# Patient Record
Sex: FEMALE | Race: BLACK OR AFRICAN AMERICAN | Employment: FULL TIME | URBAN - METROPOLITAN AREA
[De-identification: names, ages, dates, MRNs, and addresses within clinical notes are randomized per-mention and may not be internally consistent; named-entity substitution may affect disease eponyms.]

---

## 2017-07-29 ENCOUNTER — OFFICE VISIT (OUTPATIENT)
Dept: FAMILY MEDICINE CLINIC | Age: 67
End: 2017-07-29

## 2017-07-29 VITALS
RESPIRATION RATE: 14 BRPM | HEIGHT: 68 IN | DIASTOLIC BLOOD PRESSURE: 69 MMHG | TEMPERATURE: 98.6 F | WEIGHT: 166.8 LBS | BODY MASS INDEX: 25.28 KG/M2 | OXYGEN SATURATION: 96 % | HEART RATE: 90 BPM | SYSTOLIC BLOOD PRESSURE: 110 MMHG

## 2017-07-29 DIAGNOSIS — J30.89 PERENNIAL ALLERGIC RHINITIS: ICD-10-CM

## 2017-07-29 DIAGNOSIS — R09.82 POST-NASAL DRAINAGE: ICD-10-CM

## 2017-07-29 DIAGNOSIS — R09.81 NASAL CONGESTION: Primary | ICD-10-CM

## 2017-07-29 RX ORDER — FLUTICASONE PROPIONATE 50 MCG
2 SPRAY, SUSPENSION (ML) NASAL DAILY
Qty: 1 BOTTLE | Refills: 0 | Status: SHIPPED | OUTPATIENT
Start: 2017-07-29 | End: 2017-08-25 | Stop reason: SDUPTHER

## 2017-07-29 RX ORDER — METHYLPREDNISOLONE 4 MG/1
TABLET ORAL
Qty: 1 DOSE PACK | Refills: 0 | Status: SHIPPED | OUTPATIENT
Start: 2017-07-29 | End: 2017-08-25 | Stop reason: ALTCHOICE

## 2017-07-29 NOTE — PROGRESS NOTES
Subjective:     Chief Complaint   Patient presents with    Nasal Discharge     c/o post nasal drip, zyrtec and flonase taken    Cough        She  is a 78y.o. year old female with h/o perineal allergy who presents with a complain of nasal congestion and mild cough for the past 3 weeks. Reports that she takes zyrtec on a regular basis for the allergy. Just started taking Flonase one week ago. C/O post nasal drip but its mostly clear. Denies any wheezing, soa, fever, chills, chest pain or any sinus [pain. States that she is going on a vacation, so would like to make sure there is no infection going on. Pertinent items are noted in HPI.   Objective:     Vitals:    07/29/17 1013   BP: 110/69   Pulse: 90   Resp: 14   Temp: 98.6 °F (37 °C)   TempSrc: Oral   SpO2: 96%   Weight: 166 lb 12.8 oz (75.7 kg)   Height: 5' 8\" (1.727 m)       Physical Examination: General appearance - alert, well appearing, and in no distress, oriented to person, place, and time and normal appearing weight  Mental status - alert, oriented to person, place, and time, normal mood, behavior, speech nasal, dress, motor activity, and thought processes  Ears - bilateral TM's and external ear canals normal  Nose - mucosal congestion, mucosal pallor and sinuses normal and nontender  Mouth - mucous membranes moist, pharynx normal without lesions  Neck - supple, no significant adenopathy  Chest - clear to auscultation, no wheezes, rales or rhonchi, symmetric air entry  Heart - normal rate, regular rhythm, normal S1, S2, no murmurs, rubs, clicks or gallops    Allergies   Allergen Reactions    Pravachol [Pravastatin] Myalgia     40 mg    Zocor [Simvastatin] Myalgia     Tolerated 10 mg fine, but aching on 20 mg      Social History     Social History    Marital status: UNKNOWN     Spouse name: N/A    Number of children: N/A    Years of education: N/A     Occupational History    [employer]      Social History Main Topics    Smoking status: Never Smoker    Smokeless tobacco: Never Used    Alcohol use Yes      Comment: occ wine    Drug use: No    Sexual activity: Yes     Partners: Male     Other Topics Concern    Caffeine Concern No     1/2 cup half caff coffee once a day, otherwise not much caffeine except occ sweet tea    Weight Concern No     happy w/ better diet and increased exercise she has gotten her wt down from 174 to 160 lbs range    Special Diet Yes     Spring 2014: restarted Weight Watchers which she has done on and off since ~     Exercise Yes     yoga and stretches qd; walks 3-5 x a week weather permitting x 81-72 minutes     Social History Narrative      Family History   Problem Relation Age of Onset    Breast Cancer Mother      dx in her 52's,  age 58    Cancer Mother      uterine cancer in her 52's, treated    Hypertension Father     Stroke Father       age 62    Seizures Father     Stroke Maternal Grandmother       in her [de-identified]    Cancer Maternal Grandfather      ?stomach cancer    Heart Disease Maternal Grandfather       in his 66's    Seizures Paternal [de-identified]     Alcohol abuse Brother       age 36 from complications    Breast Cancer Other      maternal 1st cousin    Breast Cancer Paternal Aunt      in her 66's      Past Surgical History:   Procedure Laterality Date    HAND/FINGER SURGERY UNLISTED      dislocated thumb surgery repair    HX  SECTION  ,     HX COLONOSCOPY  2015    Repeat 5 yrs/[facility]    HX HYSTERECTOMY  2004    GAUDENCIO-LSO, fibroids    HX MASTECTOMY  2004    left    HX MASTECTOMY  2004    left breast DCIS, total mastectomy and saline implant    HX TUBAL LIGATION      [place] COLONOSCOPY W/BIOPSY SINGLE/MULTIPLE      benign polyp; f/u 5 yrs, Dr Lesvia Moseley  2005    benign polyp; f/u 5 yrs, Dr Shonda Antoine THYROID/PARATHYROID/SOFT TISS      normal      Past Medical History:   Diagnosis Date    Asthma 2011    Borderline abnormal thyroid function test 1/7/2014 1/2014    Depression with anxiety 3/18/2010    Depression with anxiety 9/14/2011    Fe deficiency anemia 3/18/2010    H/O mammogram 05/23/2016    normal, Dr Sophia Mistry History of bone density study 11/19/2014    normal, Dr Sophia Mistry History of breast cancer, s/p left mastectomy 2004 and 5yr of Tamoxifen 9/14/2011    History of iron deficiency anemia 9/14/2011    Hyperlipidemia 3/18/2010    IBS (irritable bowel syndrome) 3/18/2010    Microscopic hematuria, benign 9/14/2011    Osteoarthritis of right hip 9/14/2011    Pap test, as part of routine gynecological examination 10/27/2015    normal Dr Margi Christianson, repeat 1 yr    Perennial allergic rhinitis 9/14/2011    S/P colonoscopic polypectomy, benign 2005 9/14/2011    S/P GAUDENCIO (total abdominal hysterectomy) w/ LSO for Uterine Fibroids 2004 9/14/2011    Seborrheic keratosis 9/14/2011    Vitamin D deficiency 1/7/2014      Current Outpatient Prescriptions   Medication Sig Dispense Refill    methylPREDNISolone (MEDROL DOSEPACK) 4 mg tablet Follow pack instruction. 1 Dose Pack 0    fluticasone (FLONASE) 50 mcg/actuation nasal spray 2 Sprays by Both Nostrils route daily. 1 Bottle 0    atorvastatin (LIPITOR) 20 mg tablet TAKE 1 TABLET DAILY 90 Tab 3    triamcinolone acetonide (KENALOG) 0.1 % topical cream       fluticasone (FLONASE) 50 mcg/actuation nasal spray 2 Sprays by Both Nostrils route daily. 1 Bottle 0    CALCIUM CARBONATE/VITAMIN D3 (CALCIUM+D PO) Take  by mouth daily. Includes 400 units vit D      DOCOSAHEXANOIC ACID/EPA (FISH OIL PO) Take  by mouth daily.  Cholecalciferol, Vitamin D3, (VITAMIN D3) 1,000 unit cap Take 1,000 Units by mouth daily.  beclomethasone (QVAR) 80 mcg/actuation inhaler Take 1 Puff by inhalation two (2) times a day.  cetirizine (ZYRTEC) 10 mg tablet Take 10 mg by mouth as needed.       albuterol (PROVENTIL HFA, VENTOLIN HFA, PROAIR HFA) 90 mcg/actuation inhaler Take 1 Puff by inhalation every six (6) hours as needed for Wheezing. 1 Inhaler 0        Assessment/ Plan:   Diagnoses and all orders for this visit:    1. Nasal congestion  -    Patient brought her nasal spray with her today. Seems like she has been using it in wrong. I showed her the correct way to use the Flonase. Symptoms been going on for three week. -    Start  methylPREDNISolone (MEDROL DOSEPACK) 4 mg tablet; Follow pack instruction.  -    Start fluticasone (FLONASE) 50 mcg/actuation nasal spray; 2 Sprays by Both Nostrils route daily. -    Take OTC Decongestant. 2. Post-nasal drainage  -     methylPREDNISolone (MEDROL DOSEPACK) 4 mg tablet; Follow pack instruction. -     fluticasone (FLONASE) 50 mcg/actuation nasal spray; 2 Sprays by Both Nostrils route daily. 3. Perennial allergic rhinitis  -     methylPREDNISolone (MEDROL DOSEPACK) 4 mg tablet; Follow pack instruction. -     fluticasone (FLONASE) 50 mcg/actuation nasal spray; 2 Sprays by Both Nostrils route daily. Medication risks/benefits/costs/interactions/alternatives discussed with patient. Advised patient to call back or return to office if symptoms worsen/change/persist. If patient cannot reach us or should anything more severe/urgent arise he/she should proceed directly to the nearest emergency department. Discussed expected course/resolution/complications of diagnosis in detail with patient. Patient given a written after visit summary which includes her diagnoses, current medications and vitals. Patient expressed understanding with the diagnosis and plan. Follow-up Disposition:  Return if symptoms worsen or fail to improve.

## 2017-07-29 NOTE — PROGRESS NOTES
Chief Complaint   Patient presents with    Nasal Discharge     c/o post nasal drip, zyrtec and flonase taken    Cough     X 3 week  Med list reviewed  \"REVIEWED RECORD IN PREPARATION FOR VISIT AND HAVE OBTAINED THE NECESSARY DOCUMENTATION\"

## 2017-08-25 ENCOUNTER — OFFICE VISIT (OUTPATIENT)
Dept: FAMILY MEDICINE CLINIC | Age: 67
End: 2017-08-25

## 2017-08-25 VITALS
OXYGEN SATURATION: 95 % | RESPIRATION RATE: 16 BRPM | SYSTOLIC BLOOD PRESSURE: 126 MMHG | BODY MASS INDEX: 25.01 KG/M2 | DIASTOLIC BLOOD PRESSURE: 70 MMHG | WEIGHT: 165 LBS | TEMPERATURE: 99.4 F | HEART RATE: 90 BPM | HEIGHT: 68 IN

## 2017-08-25 DIAGNOSIS — E78.5 HYPERLIPIDEMIA, UNSPECIFIED HYPERLIPIDEMIA TYPE: Primary | Chronic | ICD-10-CM

## 2017-08-25 DIAGNOSIS — Z23 NEED FOR VACCINATION WITH 13-POLYVALENT PNEUMOCOCCAL CONJUGATE VACCINE: ICD-10-CM

## 2017-08-25 RX ORDER — ASCORBIC ACID 250 MG
TABLET ORAL
COMMUNITY
End: 2019-08-29

## 2017-08-25 RX ORDER — LORATADINE 10 MG/1
10 TABLET ORAL
COMMUNITY
End: 2019-03-29

## 2017-08-25 NOTE — MR AVS SNAPSHOT
Visit Information Date & Time Provider Department Dept. Phone Encounter #  
 8/25/2017  8:00 AM Mike Kelly, [facility] [phone] [id number] Upcoming Health Maintenance Date Due  
 MEDICARE YEARLY EXAM 2/15/2016 INFLUENZA AGE 9 TO ADULT 8/1/2017 DTaP/Tdap/Td series (1 - Tdap) 9/3/2017* Hepatitis C Screening 9/3/2018* ZOSTER VACCINE AGE 60> 9/13/2021* Pneumococcal 65+ Low/Medium Risk (2 of 2 - PCV13) 9/7/2017 BREAST CANCER SCRN MAMMOGRAM 5/23/2018 GLAUCOMA SCREENING Q2Y 3/1/2019 COLONOSCOPY 6/8/2020 *Topic was postponed. The date shown is not the original due date. Allergies as of 8/25/2017  Review Complete On: 8/25/2017 By: Mike Kelly MD  
  
 Severity Noted Reaction Type Reactions Pravachol [Pravastatin]  05/08/2015    Myalgia 40 mg  
 Zocor [Simvastatin]  03/18/2010    Myalgia Tolerated 10 mg fine, but aching on 20 mg  
  
Current Immunizations  Reviewed on 8/25/2017 Name Date Influenza Vaccine 10/1/2013 Pneumococcal Polysaccharide (PPSV-23) 9/7/2016 Reviewed by Mike Kelly MD on 8/25/2017 at  8:39 AM  
 Reviewed by Mike Kelly MD on 8/25/2017 at  8:39 AM  
 Reviewed by Mike Kelly MD on 8/25/2017 at  8:39 AM  
You Were Diagnosed With   
  
 Codes Comments Hyperlipidemia, unspecified hyperlipidemia type    -  Primary ICD-10-CM: E78.5 ICD-9-CM: 272.4 Need for vaccination with 13-polyvalent pneumococcal conjugate vaccine     ICD-10-CM: R57 ICD-9-CM: V03.82 Vitals BP Pulse Temp Resp Height(growth percentile) Weight(growth percentile) 126/70 (BP 1 Location: Right arm, BP Patient Position: Sitting) 90 99.4 °F (37.4 °C) (Oral) 16 5' 8\" (1.727 m) 165 lb (74.8 kg) SpO2 BMI OB Status Smoking Status 95% 25.09 kg/m2 Hysterectomy Never Smoker Vitals History BMI and BSA Data Body Mass Index Body Surface Area 25.09 kg/m 2 1.89 m 2 Preferred Pharmacy Pharmacy Name Phone [facility] [phone] Your Updated Medication List  
  
   
This list is accurate as of: 17  8:42 AM.  Always use your most recent med list.  
  
  
  
  
 albuterol 90 mcg/actuation inhaler Commonly known as:  PROVENTIL HFA, VENTOLIN HFA, PROAIR HFA Take 1 Puff by inhalation every six (6) hours as needed for Wheezing. atorvastatin 20 mg tablet Commonly known as:  LIPITOR  
TAKE 1 TABLET DAILY CALCIUM+D PO Take  by mouth daily. Includes 400 units vit D  
  
 FISH OIL PO Take  by mouth daily. fluticasone 50 mcg/actuation nasal spray Commonly known as:  Norman [place] 2 Sprays by Both Nostrils route daily. pneumococcal 13 collin conj dip 0.5 mL Syrg injection Commonly known as:  PREVNAR-13  
0.5 mL by IntraMUSCular route once for 1 dose. UPON ADMINISTRATION PLEASE FAX VERIFICATION -[phone], THANK YOU!  
  
 QVAR 80 mcg/actuation Aero Generic drug:  beclomethasone Take 1 Puff by inhalation two (2) times daily as needed. triamcinolone acetonide 0.1 % topical cream  
Commonly known as:  KENALOG  
  
 VITAMIN C 250 mg tablet Generic drug:  ascorbic acid (vitamin C) Take  by mouth. VITAMIN D3 1,000 unit Cap Generic drug:  cholecalciferol Take 1,000 Units by mouth daily. ZyrTEC 10 mg tablet Generic drug:  cetirizine Take 10 mg by mouth daily. Prescriptions Printed Refills  
 pneumococcal 13 collin conj dip (PREVNAR-13) 0.5 mL syrg injection 0 Si.5 mL by IntraMUSCular route once for 1 dose. UPON ADMINISTRATION PLEASE FAX VERIFICATION -[phone], THANK YOU! Class: Print Route: IntraMUSCular We Performed the Following CBC W/O DIFF [79383 CPT(R)] LIPID PANEL [88902 CPT(R)] METABOLIC PANEL, COMPREHENSIVE [37990 CPT(R)] TSH 3RD GENERATION [85609 CPT(R)] Patient Instructions Pneumococcal Conjugate Vaccine (PCV13): What You Need to Know Why get vaccinated? Vaccination can protect both children and adults from pneumococcal disease. Pneumococcal disease is caused by bacteria that can spread from person to person through close contact. It can cause ear infections, and it can also lead to more serious infections of the: 
· Lungs (pneumonia). · Blood (bacteremia). · Covering of the brain and spinal cord (meningitis). Pneumococcal pneumonia is most common among adults. Pneumococcal meningitis can cause deafness and brain damage, and it kills about 1 child in 10 who get it. Anyone can get pneumococcal disease, but children under 3years of age and adults 72 years and older, people with certain medical conditions, and cigarette smokers are at the highest risk. Before there was a vaccine, the [facility] saw the following in children under 5 each year from pneumococcal disease: · More than 700 cases of meningitis · About 13,000 blood infections · About 5 million ear infections · About 200 deaths Since the vaccine became available, severe pneumococcal disease in these children has fallen by 88%. About 18,000 older adults die of pneumococcal disease each year in the [place]. Treatment of pneumococcal infections with penicillin and other drugs is not as effective as it used to be, because some strains of the disease have become resistant to these drugs. This makes prevention of the disease through vaccination even more important. PCV13 vaccine Pneumococcal conjugate vaccine (called PCV13) protects against 13 types of pneumococcal bacteria. PCV13 is routinely given to children at 2, 4, 6, and 1515 months of age. It is also recommended for children and adults 3to 59years of age with certain health conditions, and for all adults 72years of age and older. Your doctor can give you details. Some people should not get this vaccine Anyone who has ever had a life-threatening allergic reaction to a dose of this vaccine, to an earlier pneumococcal vaccine called PCV7, or to any vaccine containing diphtheria toxoid (for example, DTaP), should not get PCV13. Anyone with a severe allergy to any component of PCV13 should not get the vaccine. Tell your doctor if the person being vaccinated has any severe allergies. If the person scheduled for vaccination is not feeling well, your healthcare provider might decide to reschedule the shot on another day. Risks of a vaccine reaction With any medicine, including vaccines, there is a chance of reactions. These are usually mild and go away on their own, but serious reactions are also possible. Problems reported following PCV13 varied by age and dose in the series. The most common problems reported among children were: · About half became drowsy after the shot, had a temporary loss of appetite, or had redness or tenderness where the shot was given. · About 1 out of 3 had swelling where the shot was given. · About 1 out of 3 had a mild fever, and about 1 in 20 had a fever over 102.2°F. 
· Up to about 8 out of 10 became fussy or irritable. Adults have reported pain, redness, and swelling where the shot was given; also mild fever, fatigue, headache, chills, or muscle pain. Luis Mosquera children who get PCV13 along with inactivated flu vaccine at the same time may be at increased risk for seizures caused by fever. Ask your doctor for more information. Problems that could happen after any vaccine: · People sometimes faint after a medical procedure, including vaccination. Sitting or lying down for about 15 minutes can help prevent fainting and the injuries caused by a fall. Tell your doctor if you feel dizzy or have vision changes or ringing in the ears. · Some older children and adults get severe pain in the shoulder and have difficulty moving the arm where a shot was given. This happens very rarely. · Any medication can cause a severe allergic reaction. Such reactions from a vaccine are very rare, estimated at about 1 in a million doses, and would happen within a few minutes to a few hours after the vaccination. As with any medicine, there is a very small chance of a vaccine causing a serious injury or death. The safety of vaccines is always being monitored. For more information, visit: www.cdc.gov/vaccinesafety. What if there is a serious reaction? What should I look for? · Look for anything that concerns you, such as signs of a severe allergic reaction, very high fever, or unusual behavior. Signs of a severe allergic reaction can include hives, swelling of the face and throat, difficulty breathing, a fast heartbeat, dizziness, and weakness, usually within a few minutes to a few hours after the vaccination. What should I do? · If you think it is a severe allergic reaction or other emergency that can't wait, call 911 or get the person to the nearest hospital. Otherwise, call your doctor. · Reactions should be reported to the Vaccine Adverse Event Reporting System (VAERS). Your doctor should file this report, or you can do it yourself through the VAERS website at www.vaers. [facility].gov, or by calling 3-993.890.3449. VAERS does not give medical advice. The National Vaccine Injury Compensation Program 
The National Vaccine Injury Compensation Program (VICP) is a federal program that was created to compensate people who may have been injured by certain vaccines. Persons who believe they may have been injured by a vaccine can learn about the program and about filing a claim by calling 7-251.322.9308 or visiting the [employer] website at www.[place].gov/vaccinecompensation. There is a time limit to file a claim for compensation. How can I learn more? · Ask your healthcare provider. He or she can give you the vaccine package insert or suggest other sources of information. · Call your local or state health department. · Contact the Centers for Disease Control and Prevention (CDC): 
¨ Call 0-323.482.5747 (1-800-CDC-INFO) or ¨ Visit CDC's website at www.cdc.gov/vaccines Vaccine Information Statement PCV13 Vaccine 11/5/2015 
[address] [id number]-63 Department of [facility] and [employer] Centers for Disease Control and Prevention Many Vaccine Information Statements are available in Amharic and other languages. See www.immunize.org/vis. Muchas hojas de información sobre vacunas están disponibles en español y en otros idiomas. Visite www.immunize.org/vis. Care instructions adapted under license by [employer] (which disclaims liability or warranty for this information). If you have questions about a medical condition or this instruction, always ask your healthcare professional. [address] any warranty or liability for your use of this information. Introducing [facility]! Gonzales Cox introduces [employer] patient portal. Now you can access parts of your medical record, email your doctor's office, and request medication refills online. 1. In your internet browser, go to [email or web address] [employer] 2. Click on the First Time User? Click Here link in the Sign In box. You will see the New Member Sign Up page. 3. Enter your [employer] Access Code exactly as it appears below. You will not need to use this code after youve completed the sign-up process. If you do not sign up before the expiration date, you must request a new code. · [employer] Access Code: [email or web address] Expires: 10/27/2017 10:43 AM 
 
4. Enter the last four digits of your Social Security Number (xxxx) and Date of Birth (mm/dd/yyyy) as indicated and click Submit. You will be taken to the next sign-up page. 5. Create a [employer] ID. This will be your [employer] login ID and cannot be changed, so think of one that is secure and easy to remember. 6. Create a [employer] password. You can change your password at any time. 7. Enter your Password Reset Question and Answer. This can be used at a later time if you forget your password. 8. Enter your e-mail address. You will receive e-mail notification when new information is available in [address] 9. Click Sign Up. You can now view and download portions of your medical record. 10. Click the Download Summary menu link to download a portable copy of your medical information. If you have questions, please visit the Frequently Asked Questions section of the [employer] website. Remember, [employer] is NOT to be used for urgent needs. For medical emergencies, dial 911. Now available from your iPhone and Android! Please provide this summary of care documentation to your next provider. Your primary care clinician is listed as MARGARET LAURENT. If you have any questions after today's visit, please call [phone].

## 2017-08-25 NOTE — PROGRESS NOTES
Chief Complaint   Patient presents with    Cholesterol Problem     follow up- not fasting          1. Have you been to the ER, urgent care clinic since your last visit? Hospitalized since your last visit? No    2. Have you seen or consulted any other health care providers outside of the [address] [place] [address] since your last visit? Include any pap smears or colon screening.  No

## 2017-08-25 NOTE — PATIENT INSTRUCTIONS
Pneumococcal Conjugate Vaccine (PCV13): What You Need to Know  Why get vaccinated? Vaccination can protect both children and adults from pneumococcal disease. Pneumococcal disease is caused by bacteria that can spread from person to person through close contact. It can cause ear infections, and it can also lead to more serious infections of the:  · Lungs (pneumonia). · Blood (bacteremia). · Covering of the brain and spinal cord (meningitis). Pneumococcal pneumonia is most common among adults. Pneumococcal meningitis can cause deafness and brain damage, and it kills about 1 child in 10 who get it. Anyone can get pneumococcal disease, but children under 3years of age and adults 72 years and older, people with certain medical conditions, and cigarette smokers are at the highest risk. Before there was a vaccine, the [facility] saw the following in children under 5 each year from pneumococcal disease:  · More than 700 cases of meningitis  · About 13,000 blood infections  · About 5 million ear infections  · About 200 deaths  Since the vaccine became available, severe pneumococcal disease in these children has fallen by 88%. About 18,000 older adults die of pneumococcal disease each year in the [place]. Treatment of pneumococcal infections with penicillin and other drugs is not as effective as it used to be, because some strains of the disease have become resistant to these drugs. This makes prevention of the disease through vaccination even more important. PCV13 vaccine  Pneumococcal conjugate vaccine (called PCV13) protects against 13 types of pneumococcal bacteria. PCV13 is routinely given to children at 2, 4, 6, and 1515 months of age. It is also recommended for children and adults 3to 59years of age with certain health conditions, and for all adults 72years of age and older. Your doctor can give you details.   Some people should not get this vaccine  Anyone who has ever had a life-threatening allergic reaction to a dose of this vaccine, to an earlier pneumococcal vaccine called PCV7, or to any vaccine containing diphtheria toxoid (for example, DTaP), should not get PCV13. Anyone with a severe allergy to any component of PCV13 should not get the vaccine. Tell your doctor if the person being vaccinated has any severe allergies. If the person scheduled for vaccination is not feeling well, your healthcare provider might decide to reschedule the shot on another day. Risks of a vaccine reaction  With any medicine, including vaccines, there is a chance of reactions. These are usually mild and go away on their own, but serious reactions are also possible. Problems reported following PCV13 varied by age and dose in the series. The most common problems reported among children were:  · About half became drowsy after the shot, had a temporary loss of appetite, or had redness or tenderness where the shot was given. · About 1 out of 3 had swelling where the shot was given. · About 1 out of 3 had a mild fever, and about 1 in 20 had a fever over 102.2°F.  · Up to about 8 out of 10 became fussy or irritable. Adults have reported pain, redness, and swelling where the shot was given; also mild fever, fatigue, headache, chills, or muscle pain. [employer] children who get PCV13 along with inactivated flu vaccine at the same time may be at increased risk for seizures caused by fever. Ask your doctor for more information. Problems that could happen after any vaccine:  · People sometimes faint after a medical procedure, including vaccination. Sitting or lying down for about 15 minutes can help prevent fainting and the injuries caused by a fall. Tell your doctor if you feel dizzy or have vision changes or ringing in the ears. · Some older children and adults get severe pain in the shoulder and have difficulty moving the arm where a shot was given. This happens very rarely.   · Any medication can cause a severe allergic reaction. Such reactions from a vaccine are very rare, estimated at about 1 in a million doses, and would happen within a few minutes to a few hours after the vaccination. As with any medicine, there is a very small chance of a vaccine causing a serious injury or death. The safety of vaccines is always being monitored. For more information, visit: www.cdc.gov/vaccinesafety. What if there is a serious reaction? What should I look for? · Look for anything that concerns you, such as signs of a severe allergic reaction, very high fever, or unusual behavior. Signs of a severe allergic reaction can include hives, swelling of the face and throat, difficulty breathing, a fast heartbeat, dizziness, and weakness, usually within a few minutes to a few hours after the vaccination. What should I do? · If you think it is a severe allergic reaction or other emergency that can't wait, call 911 or get the person to the nearest hospital. Otherwise, call your doctor. · Reactions should be reported to the Vaccine Adverse Event Reporting System (VAERS). Your doctor should file this report, or you can do it yourself through the VAERS website at www.vaers. [facility].gov, or by calling 2-498.220.6607. VAERS does not give medical advice. The National Vaccine Injury Compensation Program  The National Vaccine Injury Compensation Program (VICP) is a federal program that was created to compensate people who may have been injured by certain vaccines. Persons who believe they may have been injured by a vaccine can learn about the program and about filing a claim by calling 1-309.620.6005 or visiting the [address] [employer] website at www.[facility].gov/vaccinecompensation. There is a time limit to file a claim for compensation. How can I learn more? · Ask your healthcare provider. He or she can give you the vaccine package insert or suggest other sources of information. · Call your local or state health department.   · Contact the [facility] Disease Control and Prevention (CDC):  ¨ Call 4-899.530.8365 (1-800-CDC-INFO) or  ¨ Visit CDC's website at www.cdc.gov/vaccines  Vaccine Information Statement  PCV13 Vaccine  11/5/2015  [address] Estim[address] [id number]  Department of Health and Human Services  Centers for Disease Control and Prevention  Many Vaccine Information Statements are available in [place] and other languages. See www.immunize.org/vis. Muchas hojas de información sobre vacunas están disponibles en español y en otros idiomas. Visite www.immunize.org/vis. Care instructions adapted under license by [employer] (which disclaims liability or warranty for this information). If you have questions about a medical condition or this instruction, always ask your healthcare professional. [address] any warranty or liability for your use of this information.

## 2017-08-25 NOTE — PROGRESS NOTES
HISTORY OF PRESENT ILLNESS   HPI  Follow up hypercholesterolemia, medication check. Doing well on Lipitor, tolerating w/o reaction or side effects. Overall has been getting along pretty well and feeling well in general.  Allergies have been bothering her again for quite some time now. She was treated w/ Medrol Pack last month. She also felt remarkably better when she was out of town in [place] and had no sinus issues while there. The congestion, drainage and cough returned when she got back. She recently changed from Zyrtec to Claritin and continues on Flonase. She has not seen Dr Smith x many years but will follow up there for re-evaluation. REVIEW OF SYMPTOMS     Review of Systems   Constitutional: Negative. Respiratory: Negative. Cardiovascular: Negative. Gastrointestinal: Negative. Musculoskeletal: Negative for myalgias.    Neurological: Negative.            PROBLEM LIST/MEDICAL HISTORY      Problem List  Date Reviewed: 8/25/2017          Codes Class Noted    Borderline abnormal thyroid function test ICD-10-CM: R94.6  ICD-9-CM: 794.5  1/7/2014    Overview Signed 1/7/2014  9:54 PM by Chloe Lemus MD     1/2014             Vitamin D deficiency ICD-10-CM: E55.9  ICD-9-CM: 268.9  1/7/2014        Perennial allergic rhinitis ICD-10-CM: J30.89  ICD-9-CM: 477.8  9/14/2011    Overview Addendum 9/14/2011  1:34 PM by Chloe Lemus MD     2011 Allergist Dr Smith; dust, trees, mold, fall time             Asthma ICD-10-CM: J45.909  ICD-9-CM: 493.90  9/14/2011    Overview Signed 9/14/2011  9:28 AM by Chloe Lemus MD     PFT's allergist office July 2011, Dr Smith             History of iron deficiency anemia ICD-10-CM: Z86.2  ICD-9-CM: V12.3  9/14/2011        Seborrheic keratosis ICD-10-CM: L82.1  ICD-9-CM: 702.19  9/14/2011    Overview Signed 9/14/2011  9:29 AM by Chloe Lemus MD     Dermatology, Dr Morgan Luis             Depression with anxiety ICD-10-CM: F41.8  ICD-9-CM: 300.4  2011    Overview Signed 2011  1:32 PM by Trever Aguero MD     [id number]             History of breast cancer/DCIS, s/p left mastectomy  and 5yr of Tamoxifen ICD-10-CM: Z85.3  ICD-9-CM: V10.3  2011    Overview Addendum 2011  1:34 PM by Trever Aguero MD     DCIS; Onc Dr Rosalio Choudhury             S/P GAUDENCIO (total abdominal hysterectomy) w/ LSO for Uterine Fibroids  ICD-10-CM: Z90.710  ICD-9-CM: V88.01  2011        S/P colonoscopic polypectomy, benign  ICD-10-CM: Z98.890  ICD-9-CM: V45.89  2011    Overview Signed 2011  1:35 PM by MD Dr Anita Perera [place]             Microscopic hematuria, benign ICD-10-CM: R31.29  ICD-9-CM: 599.72  2011    Overview Addendum 10/17/2014 10:51 AM by Trever Aguero MD     Urology, Dr Clement Ramires, ;  FISH test negative and h/o cystoscopy as well             Osteoarthritis of right hip ICD-10-CM: M16.10  ICD-9-CM: 716.95  2011    Overview Signed 2011  1:39 PM by Trever Aguero MD     Xrays 2005             Hyperlipidemia (Chronic) ICD-10-CM: I37.1  ICD-9-CM: 272.4  3/18/2010        IBS (irritable bowel syndrome) (Chronic) ICD-10-CM: K58.9  ICD-9-CM: 564.1  3/18/2010                  PAST SURGICAL HISTORY       Past Surgical History:   Procedure Laterality Date    HAND/FINGER SURGERY UNLISTED      dislocated thumb surgery repair    HX  SECTION  ,     HX COLONOSCOPY  2015    Repeat 5 yrs/Luis Cruz    HX HYSTERECTOMY  2004    GAUDENCIO-LSO, fibroids    HX MASTECTOMY  2004    left    HX MASTECTOMY  2004    left breast DCIS, total mastectomy and saline implant    HX TUBAL LIGATION      CO COLONOSCOPY W/BIOPSY SINGLE/MULTIPLE      benign polyp; f/u 5 yrs, Dr Justin Garcia  2005    benign polyp; f/u 5 yrs, Dr Claudetta Spine THYROID/PARATHYROID/SOFT TISS      normal         MEDICATIONS      Current Outpatient Prescriptions   Medication Sig    ascorbic acid, vitamin C, (VITAMIN C) 250 mg tablet Take  by mouth.  pneumococcal 13 collin conj dip (PREVNAR-13) 0.5 mL syrg injection 0.5 mL by IntraMUSCular route once for 1 dose. UPON ADMINISTRATION PLEASE FAX VERIFICATION -[phone], THANK YOU!    loratadine (CLARITIN) 10 mg tablet Take 10 mg by mouth.  atorvastatin (LIPITOR) 20 mg tablet TAKE 1 TABLET DAILY    triamcinolone acetonide (KENALOG) 0.1 % topical cream     albuterol (PROVENTIL HFA, VENTOLIN HFA, PROAIR HFA) 90 mcg/actuation inhaler Take 1 Puff by inhalation every six (6) hours as needed for Wheezing.  fluticasone (FLONASE) 50 mcg/actuation nasal spray 2 Sprays by Both Nostrils route daily.  DOCOSAHEXANOIC ACID/EPA (FISH OIL PO) Take  by mouth daily.  Cholecalciferol, Vitamin D3, (VITAMIN D3) 1,000 unit cap Take 1,000 Units by mouth daily.  beclomethasone (QVAR) 80 mcg/actuation inhaler Take 1 Puff by inhalation two (2) times daily as needed.  CALCIUM CARBONATE/VITAMIN D3 (CALCIUM+D PO) Take  by mouth daily. Includes 400 units vit D     No current facility-administered medications for this visit.            ALLERGIES     Allergies   Allergen Reactions    Pravachol [Pravastatin] Myalgia     40 mg    Zocor [Simvastatin] Myalgia     Tolerated 10 mg fine, but aching on 20 mg          SOCIAL HISTORY       Social History     Social History    Marital status:      Spouse name: N/A    Number of children: N/A    Years of education: N/A     Occupational History    [employer]      Social History Main Topics    Smoking status: Never Smoker    Smokeless tobacco: Never Used    Alcohol use Yes      Comment: occ wine    Drug use: No    Sexual activity: Yes     Partners: Male     Other Topics Concern    Caffeine Concern No     1 cup decaff coffee a day     Weight Concern No     happy w/ better diet and increased exercise she has gotten her wt down from 174 to 160 lbs range    Special Diet Yes Spring 2014: restarted Weight Watchers which she has done on and off since ~     Exercise Yes     yoga and stretches qd; walks bid x 15 minutes     Social History Narrative        IMMUNIZATIONS  Immunization History   Administered Date(s) Administered    Influenza Vaccine 10/01/2013    Pneumococcal Polysaccharide (PPSV-23) 2016         FAMILY HISTORY     Family History   Problem Relation Age of Onset    Breast Cancer Mother      dx in her 52's,  age 58    Cancer Mother      uterine cancer in her 52's, treated    Hypertension Father     Stroke Father       age 62    Seizures Father     Stroke Maternal Grandmother       in her [de-identified]    Cancer Maternal Grandfather      ?stomach cancer    Heart Disease Maternal Grandfather       in his 66's    Seizures Paternal Aunt     Alcohol abuse Brother       age 36 from complications    Breast Cancer Other      maternal 1st cousin    Breast Cancer Paternal Aunt      in her 66's         VITALS     Visit Vitals    /70 (BP 1 Location: Right arm, BP Patient Position: Sitting)    Pulse 90    Temp 99.4 °F (37.4 °C) (Oral)    Resp 16    Ht 5' 8\" (1.727 m)    Wt 165 lb (74.8 kg)    SpO2 95%    BMI 25.09 kg/m2          PHYSICAL EXAMINATION     Physical Exam   Constitutional: No distress. Cardiovascular: Normal rate, regular rhythm and normal heart sounds. No murmur heard. Pulmonary/Chest: Effort normal and breath sounds normal. No respiratory distress. She has no wheezes. She has no rales. Abdominal: Soft. Bowel sounds are normal. She exhibits no distension and no mass. There is no tenderness. Musculoskeletal: She exhibits no edema or tenderness. Vitals reviewed.              ASSESSMENT & PLAN       ICD-10-CM ICD-9-CM    1. Hyperlipidemia, unspecified hyperlipidemia type E78.5 272.4 CBC W/O DIFF      METABOLIC PANEL, COMPREHENSIVE      LIPID PANEL      TSH 3RD GENERATION   2.  Need for vaccination with 13-polyvalent pneumococcal conjugate vaccine Z23 V03.82 pneumococcal 13 collin conj dip (PREVNAR-13) 0.5 mL syrg injection     She will RTC fasting for the above labs next week  Reviewed diet, exercise and weight control  Cardiovascular risk and specific lipid/LDL goals reviewed  Reviewed medications and side effects in detail; doing well on Lipitor  She will need this renewed x 1 yr after review of labs. Further follow up & other recommendations pending review of labs as well  If all remains good and stable, RTC 1 yr CPE.  Follow up sooner prn

## 2017-08-31 LAB
ALBUMIN SERPL-MCNC: 4.7 G/DL (ref 3.6–4.8)
ALBUMIN/GLOB SERPL: 2 {RATIO} (ref 1.2–2.2)
ALP SERPL-CCNC: 54 IU/L (ref 39–117)
ALT SERPL-CCNC: 13 IU/L (ref 0–32)
AST SERPL-CCNC: 18 IU/L (ref 0–40)
BILIRUB SERPL-MCNC: 0.4 MG/DL (ref 0–1.2)
BUN SERPL-MCNC: 11 MG/DL (ref 8–27)
BUN/CREAT SERPL: 14 (ref 12–28)
CALCIUM SERPL-MCNC: 9.9 MG/DL (ref 8.7–10.3)
CHLORIDE SERPL-SCNC: 100 MMOL/L (ref 96–106)
CHOLEST SERPL-MCNC: 186 MG/DL (ref 100–199)
CO2 SERPL-SCNC: 25 MMOL/L (ref 18–29)
CREAT SERPL-MCNC: 0.77 MG/DL (ref 0.57–1)
ERYTHROCYTE [DISTWIDTH] IN BLOOD BY AUTOMATED COUNT: 12.5 % (ref 12.3–15.4)
GLOBULIN SER CALC-MCNC: 2.3 G/DL (ref 1.5–4.5)
GLUCOSE SERPL-MCNC: 77 MG/DL (ref 65–99)
HCT VFR BLD AUTO: 43 % (ref 34–46.6)
HDLC SERPL-MCNC: 71 MG/DL
HGB BLD-MCNC: 14 G/DL (ref 11.1–15.9)
INTERPRETATION, 910389: NORMAL
LDLC SERPL CALC-MCNC: 103 MG/DL (ref 0–99)
MCH RBC QN AUTO: 26.9 PG (ref 26.6–33)
MCHC RBC AUTO-ENTMCNC: 32.6 G/DL (ref 31.5–35.7)
MCV RBC AUTO: 83 FL (ref 79–97)
PLATELET # BLD AUTO: 293 X10E3/UL (ref 150–379)
POTASSIUM SERPL-SCNC: 5.3 MMOL/L (ref 3.5–5.2)
PROT SERPL-MCNC: 7 G/DL (ref 6–8.5)
RBC # BLD AUTO: 5.2 X10E6/UL (ref 3.77–5.28)
SODIUM SERPL-SCNC: 141 MMOL/L (ref 134–144)
TRIGL SERPL-MCNC: 59 MG/DL (ref 0–149)
TSH SERPL DL<=0.005 MIU/L-ACNC: 4.02 UIU/ML (ref 0.45–4.5)
VLDLC SERPL CALC-MCNC: 12 MG/DL (ref 5–40)
WBC # BLD AUTO: 4.2 X10E3/UL (ref 3.4–10.8)

## 2017-09-04 ENCOUNTER — TELEPHONE (OUTPATIENT)
Dept: FAMILY MEDICINE CLINIC | Age: 67
End: 2017-09-04

## 2017-09-04 DIAGNOSIS — E78.5 HYPERLIPIDEMIA, UNSPECIFIED HYPERLIPIDEMIA TYPE: Chronic | ICD-10-CM

## 2017-09-04 NOTE — TELEPHONE ENCOUNTER
Urine, blood counts, liver, kidney, thyroid all normal  BS good and normal/non diabetes range. Lipids overall very good and stable. Pt not on MyChart. May mail copy to pt. Keep up the good work and cont same medication regimen. I will renew her Lipitor x 1 year but I need to know if she wants this sent to [facility] for mail order or locally at [facility]? Fasting CPE 1 yr.   RTC sooner prn

## 2017-09-04 NOTE — PROGRESS NOTES
Urine, blood counts, liver, kidney, thyroid all normal  BS good and normal/non diabetes range. Lipids overall very good and stable. Pt not on MyChart. May mail copy to pt. Keep up the good work and cont same medication regimen. I have renewed her Lipitor x 1 year. Fasting CPE 1 yr.   RTC sooner prn

## 2017-09-05 RX ORDER — ATORVASTATIN CALCIUM 20 MG/1
TABLET, FILM COATED ORAL
Qty: 90 TAB | Refills: 3 | Status: SHIPPED | OUTPATIENT
Start: 2017-09-05 | End: 2018-08-26 | Stop reason: SDUPTHER

## 2017-09-05 NOTE — TELEPHONE ENCOUNTER
Spoke with patient regarding results and recommendations. Voiced understanding. Can send script to [facility].

## 2017-12-07 ENCOUNTER — TELEPHONE (OUTPATIENT)
Dept: FAMILY MEDICINE CLINIC | Age: 67
End: 2017-12-07

## 2017-12-07 NOTE — TELEPHONE ENCOUNTER
----- Message from Yariel Chandra sent at 12/6/2017  3:14 PM EST -----  Regarding: Dr. Cecy Singer  Pt is requesting a callback to discuss pneumonia shot.   Best callback [phone]

## 2018-01-06 ENCOUNTER — OFFICE VISIT (OUTPATIENT)
Dept: FAMILY MEDICINE CLINIC | Age: 68
End: 2018-01-06

## 2018-01-06 VITALS
TEMPERATURE: 98.2 F | HEIGHT: 68 IN | HEART RATE: 83 BPM | RESPIRATION RATE: 18 BRPM | SYSTOLIC BLOOD PRESSURE: 130 MMHG | BODY MASS INDEX: 24.58 KG/M2 | WEIGHT: 162.2 LBS | DIASTOLIC BLOOD PRESSURE: 74 MMHG

## 2018-01-06 DIAGNOSIS — J00 ACUTE NASOPHARYNGITIS: Primary | ICD-10-CM

## 2018-01-06 NOTE — PROGRESS NOTES
[facility] Note      Subjective:     Chief Complaint   Patient presents with    Nasal Congestion    Cough     Ayanna Quezada is a 78y.o. year old female who presents for evaluation of the following:    Nasal Congestion  4 days nasal congestion, sneezing, runny nose, chills, fever 101 at home, acute body ache which is all improved. 2 day cough and headdahce  Tx: zyrtec/claritin, flonase, aleve  History of chronic allergies  Denie fever, vomiting, diarrhea, ches tpain, SOB, wheezing. Review of Systems   Pertinent positives and negative per HPI. All other systems  reviewed are negative for a Comprehensive ROS (10+).        Past Medical History:   Diagnosis Date    Asthma 9/14/2011    Borderline abnormal thyroid function test 1/7/2014 1/2014    Depression with anxiety 3/18/2010    Depression with anxiety 9/14/2011    Fe deficiency anemia 3/18/2010    H/O mammogram 05/23/2016    normal, Dr Dafne Rsoado History of bone density study 11/19/2014    normal, Dr Dafne Rosado History of breast cancer, s/p left mastectomy 2004 and 5yr of Tamoxifen 9/14/2011    History of iron deficiency anemia 9/14/2011    Hyperlipidemia 3/18/2010    IBS (irritable bowel syndrome) 3/18/2010    Microscopic hematuria, benign 9/14/2011    Osteoarthritis of right hip 9/14/2011    Pap test, as part of routine gynecological examination 10/27/2015    normal Dr Cindi Armijo, repeat 1 yr    Perennial allergic rhinitis 9/14/2011    S/P colonoscopic polypectomy, benign 2005 9/14/2011    S/P GAUDENCIO (total abdominal hysterectomy) w/ LSO for Uterine Fibroids 2004 9/14/2011    Seborrheic keratosis 9/14/2011    Vitamin D deficiency 1/7/2014        Social History     Social History    Marital status:      Spouse name: N/A    Number of children: N/A    Years of education: N/A     Occupational History    [employer]      Social History Main Topics    Smoking status: Never Smoker    Smokeless tobacco: Never Used    Alcohol use Yes      Comment: occ wine    Drug use: No    Sexual activity: Yes     Partners: Male     Other Topics Concern    Caffeine Concern No     1 cup decaff coffee a day     Weight Concern No     happy w/ better diet and increased exercise she has gotten her wt down from 174 to 160 lbs range    Special Diet Yes     Spring 2014: restarted Weight Watchers which she has done on and off since ~ 2012    Exercise Yes     yoga and stretches qd; walks bid x 15 minutes     Social History Narrative       Current Outpatient Prescriptions   Medication Sig    atorvastatin (LIPITOR) 20 mg tablet TAKE 1 TABLET DAILY    ascorbic acid, vitamin C, (VITAMIN C) 250 mg tablet Take  by mouth.  loratadine (CLARITIN) 10 mg tablet Take 10 mg by mouth.  albuterol (PROVENTIL HFA, VENTOLIN HFA, PROAIR HFA) 90 mcg/actuation inhaler Take 1 Puff by inhalation every six (6) hours as needed for Wheezing.  fluticasone (FLONASE) 50 mcg/actuation nasal spray 2 Sprays by Both Nostrils route daily.  CALCIUM CARBONATE/VITAMIN D3 (CALCIUM+D PO) Take  by mouth daily. Includes 400 units vit D    DOCOSAHEXANOIC ACID/EPA (FISH OIL PO) Take  by mouth daily.  Cholecalciferol, Vitamin D3, (VITAMIN D3) 1,000 unit cap Take 1,000 Units by mouth daily.  beclomethasone (QVAR) 80 mcg/actuation inhaler Take 1 Puff by inhalation two (2) times daily as needed.  triamcinolone acetonide (KENALOG) 0.1 % topical cream      No current facility-administered medications for this visit. Objective:     Vitals:    01/06/18 0921   BP: 130/74   Pulse: 83   Resp: 18   Temp: 98.2 °F (36.8 °C)   TempSrc: Oral   Weight: 162 lb 3.2 oz (73.6 kg)   Height: 5' 8\" (1.727 m)       Physical Examination:  General: Alert, cooperative, no distress, appears stated age. Eyes: Conjunctivae/corneas clear. PERRL, EOMs intact. Ears: Normal external ear canals both ears.  TM clear and mobile bilaterally  Nose: Audible nasal congestion. Nares normal. Septum midline. Mucosa normal. No drainage or sinus tenderness. Mouth/Throat: Mild pharyngeal erythema. No tonsillar enlargment. Lips, mucosa, and tongue normal. Teeth and gums normal.  Neck: Supple, symmetrical, trachea midline, no adenopathy. No thyroid enlargement/tenderness/nodules  Back: Symmetric, no curvature. ROM normal. No CVA tenderness. Lungs: Clear to auscultation bilaterally. Normal inspiratory and expiratory ratio. Heart: Regular rate and rhythm, S1, S2 normal, no murmur, click, rub or gallop. Abdomen: Soft, non-tender. Bowel sounds normal. No masses or organomegaly. Extremities: Extremities normal, atraumatic, no cyanosis or edema. Pulses: 2+ and symmetric all extremities. Skin: Skin color, texture, turgor normal. No rashes or lesions on exposed skin. Lymph nodes: Cervical, supraclavicular nodes normal.  Neurologic: CNII-XII intact. Strength 5/5 grossly. Sensation and reflexes normal throughout. No visits with results within 3 Month(s) from this visit.   Latest known visit with results is:    Office Visit on 08/25/2017   Component Date Value Ref Range Status    WBC 08/30/2017 4.2  3.4 - 10.8 x10E3/uL Final    RBC 08/30/2017 5.20  3.77 - 5.28 x10E6/uL Final    HGB 08/30/2017 14.0  11.1 - 15.9 g/dL Final    HCT 08/30/2017 43.0  34.0 - 46.6 % Final    MCV 08/30/2017 83  79 - 97 fL Final    MCH 08/30/2017 26.9  26.6 - 33.0 pg Final    MCHC 08/30/2017 32.6  31.5 - 35.7 g/dL Final    RDW 08/30/2017 12.5  12.3 - 15.4 % Final    PLATELET 05/39/2525 783  150 - 379 x10E3/uL Final    Glucose 08/30/2017 77  65 - 99 mg/dL Final    BUN 08/30/2017 11  8 - 27 mg/dL Final    Creatinine 08/30/2017 0.77  0.57 - 1.00 mg/dL Final    GFR est non-AA 08/30/2017 81  >59 mL/min/1.73 Final    GFR est AA 08/30/2017 93  >59 mL/min/1.73 Final    BUN/Creatinine ratio 08/30/2017 14  12 - 28 Final    Sodium 08/30/2017 141  134 - 144 mmol/L Final    Potassium 08/30/2017 5.3* 3.5 - 5.2 mmol/L Final    Chloride 08/30/2017 100  96 - 106 mmol/L Final    CO2 08/30/2017 25  18 - 29 mmol/L Final    Calcium 08/30/2017 9.9  8.7 - 10.3 mg/dL Final    Protein, total 08/30/2017 7.0  6.0 - 8.5 g/dL Final    Albumin 08/30/2017 4.7  3.6 - 4.8 g/dL Final    GLOBULIN, TOTAL 08/30/2017 2.3  1.5 - 4.5 g/dL Final    A-G Ratio 08/30/2017 2.0  1.2 - 2.2 Final    Bilirubin, total 08/30/2017 0.4  0.0 - 1.2 mg/dL Final    Alk. phosphatase 08/30/2017 54  39 - 117 IU/L Final    AST (SGOT) 08/30/2017 18  0 - 40 IU/L Final    ALT (SGPT) 08/30/2017 13  0 - 32 IU/L Final    Cholesterol, total 08/30/2017 186  100 - 199 mg/dL Final    Triglyceride 08/30/2017 59  0 - 149 mg/dL Final    HDL Cholesterol 08/30/2017 71  >39 mg/dL Final    VLDL, calculated 08/30/2017 12  5 - 40 mg/dL Final    LDL, calculated 08/30/2017 103* 0 - 99 mg/dL Final    TSH 08/30/2017 4.020  0.450 - 4.500 uIU/mL Final    INTERPRETATION 08/30/2017 Note   Final    Supplement report is available. Assessment/ Plan:   Diagnoses and all orders for this visit:    1. Acute nasopharyngitis    Mild. OTC treatment discussed, see patient instructions. Try generic guaifenesin with dextromethorphan. I have discussed the diagnosis with the patient and the intended plan as seen in the above orders. The patient has received an after-visit summary and questions were answered concerning future plans. I have discussed medication side effects and warnings with the patient as well. Follow-up Disposition:  Return if symptoms worsen or fail to improve.       Signed,    Rosio Meneses MD  1/6/2018

## 2018-01-06 NOTE — PROGRESS NOTES
Chief Complaint   Patient presents with    Nasal Congestion    Cough     Pt presents in office today with c/o nasal congestion/cough, onset 01/02/2018  Pt denies chest pain/sob/n/v/d  Pt denies headache/sore throat  Take otc Aleve and Vit C tablets     1. Have you been to the ER, urgent care clinic since your last visit? Hospitalized since your last visit? No    2. Have you seen or consulted any other health care providers outside of the [address] [place] [address] since your last visit? Include any pap smears or colon screening.  No

## 2018-01-06 NOTE — PATIENT INSTRUCTIONS
For your symptoms: Your symptoms may improve with an oral antihistamine. These are available over the counter and include:  Loratadine/claritin  Cetirizine/Zyrtec  Fexofenadine/Allegra  Levocetirizine/Xyzal    · Your symptoms may improve with a nasal steroid. These are available over the counter and include:  · Flonase (aka fluticasone)  · Nasocort (aka triamcinolone)  · Nasonex (aka mometasone)  · Rhinocort (aka budesonide)    · Increase fluid intake, especially water to thin mucous and boost the immune system. · Avoid sugar and dairy while congested since they thicken mucous. · Get plenty of rest!    · Gargle 3 times daily and as needed in Listerine or warm salt water vinegar solutions (1 tsp salt, 1 tsp vinegar in 1 cup lukewarm water.)    · Use OTC nasal saline spray up each nostril four times daily. You could also consider using a netipot with distilled water. · Use humidifier at bedtime. · Use OTC Mucinex 600 mg twice daily to loosen mucous. Try generic guaifenesin with dextromethorphan. · Use OTC Tylenol (up to 650mg every 6 hours) or Ibuprofen (up to 800 mg every 8 hours) as needed for pain, fever or headaches. ·  Nasal Rinse     Return to the doctor for evaluation:  · If mucous is consistently discolored yellow or green throughout the day for more than a week  · If you develop worsening facial pain  · If you develop a fever that will not go away  · If your symptoms worsen instead of improve               Saline Nasal Washes: Care Instructions  Your Care Instructions  Saline nasal washes help keep the nasal passages open by washing out thick or dried mucus. This simple remedy can help relieve symptoms of allergies, sinusitis, and colds. It also can make the nose feel more comfortable by keeping the mucous membranes moist. You may notice a little burning sensation in your nose the first few times you use the solution, but this usually gets better in a few days.   Follow-up care is a key part of your treatment and safety. Be sure to make and go to all appointments, and call your doctor if you are having problems. It's also a good idea to know your test results and keep a list of the medicines you take. How can you care for yourself at home? · You can buy premixed saline solution in a squeeze bottle or other sinus rinse products at a drugstore. Read and follow the instructions on the label. · You also can make your own saline solution by adding 1 teaspoon of salt and 1 teaspoon of baking soda to 2 cups of distilled water. · If you use a homemade solution, pour a small amount into a clean bowl. Using a rubber bulb syringe, squeeze the syringe and place the tip in the salt water. Pull a small amount of the salt water into the syringe by relaxing your hand. · Sit down with your head tilted slightly back. Do not lie down. Put the tip of the bulb syringe or the squeeze bottle a little way into one of your nostrils. Gently drip or squirt a few drops into the nostril. Repeat with the other nostril. Some sneezing and gagging are normal at first.  · Gently blow your nose. · Wipe the syringe or bottle tip clean after each use. · Repeat this 2 or 3 times a day. · Use nasal washes gently if you have nosebleeds often. When should you call for help? Watch closely for changes in your health, and be sure to contact your doctor if:  ? · You often get nosebleeds. ? · You have problems doing the nasal washes. Where can you learn more? Go to http://rory-gabriela.info/. Enter [id number] in the search box to learn more about \"Saline Nasal Washes: Care Instructions. \"  Current as of: May 12, 2017  Content Version: 11.4  © 9614-0267 [employer]. Care instructions adapted under license by [employer] (which disclaims liability or warranty for this information).  If you have questions about a medical condition or this instruction, always ask your healthcare professional. Healthwise, [facility] disclaims any warranty or liability for your use of this information. Upper Respiratory Infection (Cold): Care Instructions  Your Care Instructions    An upper respiratory infection, or URI, is an infection of the nose, sinuses, or throat. URIs are spread by coughs, sneezes, and direct contact. The common cold is the most frequent kind of URI. The flu and sinus infections are other kinds of URIs. Almost all URIs are caused by viruses. Antibiotics won't cure them. But you can treat most infections with home care. This may include drinking lots of fluids and taking over-the-counter pain medicine. You will probably feel better in 4 to 10 days. The doctor has checked you carefully, but problems can develop later. If you notice any problems or new symptoms, get medical treatment right away. Follow-up care is a key part of your treatment and safety. Be sure to make and go to all appointments, and call your doctor if you are having problems. It's also a good idea to know your test results and keep a list of the medicines you take. How can you care for yourself at home? · To prevent dehydration, drink plenty of fluids, enough so that your urine is light yellow or clear like water. Choose water and other caffeine-free clear liquids until you feel better. If you have kidney, heart, or liver disease and have to limit fluids, talk with your doctor before you increase the amount of fluids you drink. · Take an over-the-counter pain medicine, such as acetaminophen (Tylenol), ibuprofen (Advil, Motrin), or naproxen (Aleve). Read and follow all instructions on the label. · Before you use cough and cold medicines, check the label. These medicines may not be safe for young children or for people with certain health problems. · Be careful when taking over-the-counter cold or flu medicines and Tylenol at the same time. Many of these medicines have acetaminophen, which is Tylenol.  Read the labels to make sure that you are not taking more than the recommended dose. Too much acetaminophen (Tylenol) can be harmful. · Get plenty of rest.  · Do not smoke or allow others to smoke around you. If you need help quitting, talk to your doctor about stop-smoking programs and medicines. These can increase your chances of quitting for good. When should you call for help? Call 911 anytime you think you may need emergency care. For example, call if:  ? · You have severe trouble breathing. ?Call your doctor now or seek immediate medical care if:  ? · You seem to be getting much sicker. ? · You have new or worse trouble breathing. ? · You have a new or higher fever. ? · You have a new rash. ? Watch closely for changes in your health, and be sure to contact your doctor if:  ? · You have a new symptom, such as a sore throat, an earache, or sinus pain. ? · You cough more deeply or more often, especially if you notice more mucus or a change in the color of your mucus. ? · You do not get better as expected. Where can you learn more? Go to http://rory-gabriela.info/. Enter K211 in the search box to learn more about \"Upper Respiratory Infection (Cold): Care Instructions. \"  Current as of: May 12, 2017  Content Version: 11.4  © 4375-2410 [employer]wise[employer]. Care instructions adapted under license by [employer] (which disclaims liability or warranty for this information). If you have questions about a medical condition or this instruction, always ask your healthcare professional. [address] any warranty or liability for your use of this information.

## 2018-08-30 ENCOUNTER — OFFICE VISIT (OUTPATIENT)
Dept: FAMILY MEDICINE CLINIC | Age: 68
End: 2018-08-30

## 2018-08-30 VITALS
HEART RATE: 74 BPM | BODY MASS INDEX: 25.28 KG/M2 | WEIGHT: 166.8 LBS | SYSTOLIC BLOOD PRESSURE: 122 MMHG | DIASTOLIC BLOOD PRESSURE: 76 MMHG | TEMPERATURE: 98.4 F | OXYGEN SATURATION: 98 % | HEIGHT: 68 IN | RESPIRATION RATE: 18 BRPM

## 2018-08-30 DIAGNOSIS — E78.5 HYPERLIPIDEMIA, UNSPECIFIED HYPERLIPIDEMIA TYPE: Primary | Chronic | ICD-10-CM

## 2018-08-30 DIAGNOSIS — R94.6 BORDERLINE ABNORMAL THYROID FUNCTION TEST: ICD-10-CM

## 2018-08-30 DIAGNOSIS — Z11.59 NEED FOR HEPATITIS C SCREENING TEST: ICD-10-CM

## 2018-08-30 DIAGNOSIS — Z86.2 HISTORY OF IRON DEFICIENCY ANEMIA: ICD-10-CM

## 2018-08-30 NOTE — PROGRESS NOTES
HISTORY OF PRESENT ILLNESS   HPI  Follow up hyperlipidemia, labs and medication check. Not fasting today. Will come back next week for blood work. Doing well on current medication regimen, tolerating w/o reaction or side effects. Still doing Weight Watchers. Per plan goals, must keep her wt <166 lbs. Walks on lunch break at work x 10-15 minutes and evenings 4 x a week about 20 minutes. She had a bout of IBS flare a few weeks ago w/ abdominal cramping, bloating, diarrhea in response to stress at work. She restarted yoga and relaxation techniques and started feeling much better almost right away. Back to normal now. Last colonoscopy 2015, f/u due 5 yrs from then per GI for h/o polyps. Overall getting along well and feeling well in general.     REVIEW OF SYMPTOMS     Review of Systems   Constitutional: Negative. Respiratory: Negative. Cardiovascular: Negative. Musculoskeletal: Negative for myalgias.    Psychiatric/Behavioral: Negative.            PROBLEM LIST/MEDICAL HISTORY      Problem List  Date Reviewed: 8/30/2018          Codes Class Noted    Borderline abnormal thyroid function test ICD-10-CM: R94.6  ICD-9-CM: 794.5  1/7/2014    Overview Signed 1/7/2014  9:54 PM by Paul Chong MD     1/2014             Vitamin D deficiency ICD-10-CM: E55.9  ICD-9-CM: 268.9  1/7/2014        Perennial allergic rhinitis ICD-10-CM: J30.89  ICD-9-CM: 477.8  9/14/2011    Overview Addendum 9/14/2011  1:34 PM by Paul Chong MD     2011 Allergist Dr Smith; dust, trees, mold, fall time             Asthma ICD-10-CM: J45.909  ICD-9-CM: 493.90  9/14/2011    Overview Signed 9/14/2011  9:28 AM by Paul Chong MD     PFT's allergist office July 2011, Dr Smith             History of iron deficiency anemia ICD-10-CM: Z86.2  ICD-9-CM: V12.3  9/14/2011        Seborrheic keratosis ICD-10-CM: L82.1  ICD-9-CM: 702.19  9/14/2011    Overview Signed 9/14/2011  9:29 AM by Paul Chong MD Dermatology, Dr Norma Gasca             Depression with anxiety ICD-10-CM: F41.8  ICD-9-CM: 300.4  2011    Overview Signed 2011  1:32 PM by Ksenia Celis MD     [id number]             History of breast cancer/DCIS, s/p left mastectomy  and 5yr of Tamoxifen ICD-10-CM: Z85.3  ICD-9-CM: V10.3  2011    Overview Addendum 2011  1:34 PM by Ksenia Celis MD     DCIS; Onc Dr Kym Harrell             S/P GAUDENCIO (total abdominal hysterectomy) w/ LSO for Uterine Fibroids  ICD-10-CM: Z90.710  ICD-9-CM: V88.01  2011        S/P colonoscopic polypectomy, benign  ICD-10-CM: Z98.890  ICD-9-CM: V45.89  2011    Overview Signed 2011  1:35 PM by MD Dr Jimenez Meade             Microscopic hematuria, benign ICD-10-CM: R31.29  ICD-9-CM: 599.72  2011    Overview Addendum 10/17/2014 10:51 AM by Ksenia Celis MD     Urology, Dr Sarabjit Llanes, 2008;  FISH test negative and h/o cystoscopy as well             Osteoarthritis of right hip ICD-10-CM: M16.10  ICD-9-CM: 716.95  2011    Overview Signed 2011  1:39 PM by Ksenia Celis MD     Xrays 2005             Hyperlipidemia (Chronic) ICD-10-CM: R84.9  ICD-9-CM: 272.4  3/18/2010        IBS (irritable bowel syndrome) (Chronic) ICD-10-CM: K58.9  ICD-9-CM: 564.1  3/18/2010                  PAST SURGICAL HISTORY       Past Surgical History:   Procedure Laterality Date    HAND/FINGER SURGERY UNLISTED      dislocated thumb surgery repair    HX  SECTION  ,     HX COLONOSCOPY  2015    Repeat 5 yrs/Luis Brand    HX HYSTERECTOMY  2004    GAUDENCIO-LSO, fibroids    HX MASTECTOMY  2004    left    HX MASTECTOMY  2004    left breast DCIS, total mastectomy and saline implant    HX TUBAL LIGATION      IA COLONOSCOPY W/BIOPSY SINGLE/MULTIPLE      benign polyp; f/u 5 yrs, Dr Sharif Velazquez  2005    benign polyp; f/u 5 yrs, Dr Socorro Morel THYROID/PARATHYROID/SOFT TISS  2007    normal         MEDICATIONS      Current Outpatient Prescriptions   Medication Sig    Cetirizine (ZYRTEC) 10 mg cap Take  by mouth.  atorvastatin (LIPITOR) 20 mg tablet TAKE 1 TABLET DAILY    ascorbic acid, vitamin C, (VITAMIN C) 250 mg tablet Take  by mouth.  albuterol (PROVENTIL HFA, VENTOLIN HFA, PROAIR HFA) 90 mcg/actuation inhaler Take 1 Puff by inhalation every six (6) hours as needed for Wheezing.  fluticasone (FLONASE) 50 mcg/actuation nasal spray 2 Sprays by Both Nostrils route daily. (Patient taking differently: 2 Sprays by Both Nostrils route daily as needed.)    CALCIUM CARBONATE/VITAMIN D3 (CALCIUM+D PO) Take  by mouth daily. Includes 400 units vit D    DOCOSAHEXANOIC ACID/EPA (FISH OIL PO) Take  by mouth daily.  Cholecalciferol, Vitamin D3, (VITAMIN D3) 1,000 unit cap Take 1,000 Units by mouth daily.  beclomethasone (QVAR) 80 mcg/actuation inhaler Take 1 Puff by inhalation two (2) times daily as needed.  loratadine (CLARITIN) 10 mg tablet Take 10 mg by mouth. No current facility-administered medications for this visit.            ALLERGIES     Allergies   Allergen Reactions    Pravachol [Pravastatin] Myalgia     40 mg    Zocor [Simvastatin] Myalgia     Tolerated 10 mg fine, but aching on 20 mg          SOCIAL HISTORY       Social History     Social History    Marital status:      Spouse name: N/A    Number of children: N/A    Years of education: N/A     Occupational History    [employer]      Social History Main Topics    Smoking status: Never Smoker    Smokeless tobacco: Never Used    Alcohol use Yes      Comment: occ wine    Drug use: No    Sexual activity: Yes     Partners: Male     Other Topics Concern    Caffeine Concern No     1 cup decaff coffee a day     Weight Concern No     happy w/ better diet and increased exercise she has gotten her wt down from 174 to 160 lbs range    Special Diet Yes     Spring 2014: restarted Weight Watchers which she has done on and off since ~     Exercise Yes     yoga once a week, walks alot at work x 10-15 minutes and in neighborhood 4 x a week x 20 minutes     Social History Narrative        IMMUNIZATIONS  Immunization History   Administered Date(s) Administered    Influenza Vaccine 10/01/2013    Pneumococcal Polysaccharide (PPSV-23) 2016         FAMILY HISTORY     Family History   Problem Relation Age of Onset    Breast Cancer Mother      dx in her 52's,  age 58    Cancer Mother      uterine cancer in her 52's, treated    Hypertension Father     Stroke Father       age 62    Seizures Father     Stroke Maternal Grandmother       in her [de-identified]    Cancer Maternal Grandfather      ?stomach cancer    Heart Disease Maternal Grandfather       in his 66's    Seizures Paternal Aunt     Alcohol abuse Brother       age 36 from complications    Breast Cancer Other      maternal 1st cousin    Breast Cancer Paternal Aunt      in her 66's         VITALS     Visit Vitals    /76 (BP 1 Location: Left arm, BP Patient Position: Sitting)    Pulse 74    Temp 98.4 °F (36.9 °C) (Oral)    Resp 18    Ht 5' 8\" (1.727 m)    Wt 166 lb 12.8 oz (75.7 kg)    SpO2 98%    BMI 25.36 kg/m2          PHYSICAL EXAMINATION     Physical Exam   Constitutional: She is well-developed, well-nourished, and in no distress. Neck: Neck supple. Carotid bruit is not present. No thyromegaly present. Cardiovascular: Normal rate, regular rhythm and normal heart sounds. No murmur heard. Pulmonary/Chest: Effort normal and breath sounds normal.   Abdominal: Soft. Bowel sounds are normal. She exhibits no distension and no mass. There is no tenderness. Musculoskeletal: She exhibits no edema or tenderness. Lymphadenopathy:     She has no cervical adenopathy. Vitals reviewed.              ASSESSMENT & PLAN       ICD-10-CM ICD-9-CM    1.  Hyperlipidemia, unspecified hyperlipidemia type E78.5 272.4 LIPID PANEL      METABOLIC PANEL, COMPREHENSIVE   2. Borderline abnormal thyroid function test R94.6 794.5 T4, FREE      TSH 3RD GENERATION      T3 TOTAL   3. History of iron deficiency anemia Z86.2 V12.3 CBC W/O DIFF   4. Need for hepatitis C screening test Z11.59 V73.89 HEPATITIS C AB     She will RTC fasting for the above labs next week  Cardiovascular risk and specific lipid/LDL goals reviewed  Reviewed diet, nutrition, exercise, weight management, BMI/goals, age/risk based screening recommendations, health maintenance & prevention counseling. Reviewed medications and side effects in detail, doing well on current regimen  Sees gyn annually for well woman care  Further follow up & other recommendations pending review of labs.  If all remains good and stable, follow up in 1 yr, sooner prn

## 2018-08-30 NOTE — PROGRESS NOTES
Chief Complaint   Patient presents with    Cholesterol Problem     follow up - will come back next week for labs      1. Have you been to the ER, urgent care clinic since your last visit? Hospitalized since your last visit? No    2. Have you seen or consulted any other health care providers outside of the [address] [place] [address] since your last visit? Include any pap smears or colon screening.    Yes Dr David Looney - gyn

## 2018-08-30 NOTE — PATIENT INSTRUCTIONS
High Cholesterol: Care Instructions  Your Care Instructions    Cholesterol is a type of fat in your blood. It is needed for many body functions, such as making new cells. Cholesterol is made by your body. It also comes from food you eat. High cholesterol means that you have too much of the fat in your blood. This raises your risk of a heart attack and stroke. LDL and HDL are part of your total cholesterol. LDL is the \"bad\" cholesterol. High LDL can raise your risk for heart disease, heart attack, and stroke. HDL is the \"good\" cholesterol. It helps clear bad cholesterol from the body. High HDL is linked with a lower risk of heart disease, heart attack, and stroke. Your cholesterol levels help your doctor find out your risk for having a heart attack or stroke. You and your doctor can talk about whether you need to lower your risk and what treatment is best for you. A heart-healthy lifestyle along with medicines can help lower your cholesterol and your risk. The way you choose to lower your risk will depend on how high your risk is for heart attack and stroke. It will also depend on how you feel about taking medicines. Follow-up care is a key part of your treatment and safety. Be sure to make and go to all appointments, and call your doctor if you are having problems. It's also a good idea to know your test results and keep a list of the medicines you take. How can you care for yourself at home? · Eat a variety of foods every day. Good choices include fruits, vegetables, whole grains (like oatmeal), dried beans and peas, nuts and seeds, soy products (like tofu), and fat-free or low-fat dairy products. · Replace butter, margarine, and hydrogenated or partially hydrogenated oils with olive and canola oils. (Canola oil margarine without trans fat is fine.)  · Replace red meat with fish, poultry, and soy protein (like tofu). · Limit processed and packaged foods like chips, crackers, and cookies.   · Bake, broil, or steam foods. Don't guerrero them. · Be physically active. Get at least 30 minutes of exercise on most days of the week. Walking is a good choice. You also may want to do other activities, such as running, swimming, cycling, or playing tennis or team sports. · Stay at a healthy weight or lose weight by making the changes in eating and physical activity listed above. Losing just a small amount of weight, even 5 to 10 pounds, can reduce your risk for having a heart attack or stroke. · Do not smoke. When should you call for help? Watch closely for changes in your health, and be sure to contact your doctor if:    · You need help making lifestyle changes.     · You have questions about your medicine. Where can you learn more? Go to http://rory-gabriela.info/. Enter Y415 in the search box to learn more about \"High Cholesterol: Care Instructions. \"  Current as of: May 10, 2017  Content Version: 11.7  © 3333-9680 [employer]. Care instructions adapted under license by [employer] (which disclaims liability or warranty for this information). If you have questions about a medical condition or this instruction, always ask your healthcare professional. [address] any warranty or liability for your use of this information. Body Mass Index: Care Instructions  Your Care Instructions    Body mass index (BMI) can help you see if your weight is raising your risk for health problems. It uses a formula to compare how much you weigh with how tall you are. · A BMI lower than 18.5 is considered underweight. · A BMI between 18.5 and 24.9 is considered healthy. · A BMI between 25 and 29.9 is considered overweight. A BMI of 30 or higher is considered obese. If your BMI is in the normal range, it means that you have a lower risk for weight-related health problems.  If your BMI is in the overweight or obese range, you may be at increased risk for weight-related health problems, such as high blood pressure, heart disease, stroke, arthritis or joint pain, and diabetes. If your BMI is in the underweight range, you may be at increased risk for health problems such as fatigue, lower protection (immunity) against illness, muscle loss, bone loss, hair loss, and hormone problems. BMI is just one measure of your risk for weight-related health problems. You may be at higher risk for health problems if you are not active, you eat an unhealthy diet, or you drink too much alcohol or use tobacco products. Follow-up care is a key part of your treatment and safety. Be sure to make and go to all appointments, and call your doctor if you are having problems. It's also a good idea to know your test results and keep a list of the medicines you take. How can you care for yourself at home? · Practice healthy eating habits. This includes eating plenty of fruits, vegetables, whole grains, lean protein, and low-fat dairy. · If your doctor recommends it, get more exercise. Walking is a good choice. Bit by bit, increase the amount you walk every day. Try for at least 30 minutes on most days of the week. · Do not smoke. Smoking can increase your risk for health problems. If you need help quitting, talk to your doctor about stop-smoking programs and medicines. These can increase your chances of quitting for good. · Limit alcohol to 2 drinks a day for men and 1 drink a day for women. Too much alcohol can cause health problems. If you have a BMI higher than 25  · Your doctor may do other tests to check your risk for weight-related health problems. This may include measuring the distance around your waist. A waist measurement of more than 40 inches in men or 35 inches in women can increase the risk of weight-related health problems. · Talk with your doctor about steps you can take to stay healthy or improve your health.  You may need to make lifestyle changes to lose weight and stay healthy, such as changing your diet and getting regular exercise. If you have a BMI lower than 18.5  · Your doctor may do other tests to check your risk for health problems. · Talk with your doctor about steps you can take to stay healthy or improve your health. You may need to make lifestyle changes to gain or maintain weight and stay healthy, such as getting more healthy foods in your diet and doing exercises to build muscle. Where can you learn more? Go to http://rory-gabriela.info/. Enter S176 in the search box to learn more about \"Body Mass Index: Care Instructions. \"  Current as of: October 13, 2016  Content Version: 11.4  © 5241-4380 [employer]. Care instructions adapted under license by [employer] (which disclaims liability or warranty for this information). If you have questions about a medical condition or this instruction, always ask your healthcare professional. [address] any warranty or liability for your use of this information.

## 2018-08-30 NOTE — MR AVS SNAPSHOT
[address] 
[phone] Patient: Gerry Anna MRN: [id number] Visit Information Date & Time Provider Department Dept. Phone Encounter #  
 8/30/2018  8:15 AM Pravin Pal  [facility] [phone] [id number] Upcoming Health Maintenance Date Due DTaP/Tdap/Td series (1 - Tdap) 2/15/1972 Pneumococcal 65+ Low/Medium Risk (2 of 2 - PCV13) 9/7/2017 Hepatitis C Screening 9/3/2018* Influenza Age 5 to Adult 3/31/2019* ZOSTER VACCINE AGE 60> 9/13/2021* GLAUCOMA SCREENING Q2Y 3/1/2019 COLONOSCOPY 6/8/2020 BREAST CANCER SCRN MAMMOGRAM 7/23/2020 *Topic was postponed. The date shown is not the original due date. Allergies as of 8/30/2018  Review Complete On: 8/30/2018 By: Pravin Pal MD  
  
 Severity Noted Reaction Type Reactions Pravachol [Pravastatin]  05/08/2015    Myalgia 40 mg  
 Zocor [Simvastatin]  03/18/2010    Myalgia Tolerated 10 mg fine, but aching on 20 mg  
  
Current Immunizations  Reviewed on 8/30/2018 Name Date Influenza Vaccine 10/1/2013 Pneumococcal Polysaccharide (PPSV-23) 9/7/2016 Reviewed by Pravin Pal MD on 8/30/2018 at  8:24 AM  
 Reviewed by Pravin Pal MD on 8/30/2018 at  8:28 AM  
You Were Diagnosed With   
  
 Codes Comments Hyperlipidemia, unspecified hyperlipidemia type    -  Primary ICD-10-CM: E78.5 ICD-9-CM: 272.4 Borderline abnormal thyroid function test     ICD-10-CM: R94.6 ICD-9-CM: 794.5 History of iron deficiency anemia     ICD-10-CM: Z86.2 ICD-9-CM: V12.3 Need for hepatitis C screening test     ICD-10-CM: Z11.59 
ICD-9-CM: V73.89 Vitals BP Pulse Temp Resp Height(growth percentile) Weight(growth percentile)  122/76 (BP 1 Location: Left arm, BP Patient Position: Sitting) 74 98.4 °F (36.9 °C) (Oral) 18 5' 8\" (1.727 m) 166 lb 12.8 oz (75.7 kg) SpO2 BMI OB Status Smoking Status 98% 25.36 kg/m2 Hysterectomy Never Smoker BMI and BSA Data Body Mass Index Body Surface Area  
 25.36 kg/m 2 1.91 m 2 Preferred Pharmacy Pharmacy Name Phone [facility], [facility] [phone] Your Updated Medication List  
  
   
This list is accurate as of 8/30/18  8:56 AM.  Always use your most recent med list.  
  
  
  
  
 albuterol 90 mcg/actuation inhaler Commonly known as:  PROVENTIL HFA, VENTOLIN HFA, PROAIR HFA Take 1 Puff by inhalation every six (6) hours as needed for Wheezing. atorvastatin 20 mg tablet Commonly known as:  LIPITOR  
TAKE 1 TABLET DAILY CALCIUM+D PO Take  by mouth daily. Includes 400 units vit D  
  
 CLARITIN 10 mg tablet Generic drug:  loratadine Take 10 mg by mouth. FISH OIL PO Take  by mouth daily. fluticasone 50 mcg/actuation nasal spray Commonly known as:  Ada Serene 2 Sprays by Both Nostrils route daily. QVAR 80 mcg/actuation A's Child Generic drug:  beclomethasone Take 1 Puff by inhalation two (2) times daily as needed. VITAMIN C 250 mg tablet Generic drug:  ascorbic acid (vitamin C) Take  by mouth. VITAMIN D3 1,000 unit Cap Generic drug:  cholecalciferol Take 1,000 Units by mouth daily. ZyrTEC 10 mg Cap Generic drug:  Cetirizine Take  by mouth. We Performed the Following CBC W/O DIFF [22903 CPT(R)] HEPATITIS C AB [05840 CPT(R)] LIPID PANEL [39024 CPT(R)] METABOLIC PANEL, COMPREHENSIVE [12931 CPT(R)]   
 T3 TOTAL [85971 CPT(R)] T4, FREE [id number] CPT(R)] TSH 3RD GENERATION [39736 CPT(R)] Patient Instructions High Cholesterol: Care Instructions Your Care Instructions Cholesterol is a type of fat in your blood.  It is needed for many body functions, such as making new cells. Cholesterol is made by your body. It also comes from food you eat. High cholesterol means that you have too much of the fat in your blood. This raises your risk of a heart attack and stroke. LDL and HDL are part of your total cholesterol. LDL is the \"bad\" cholesterol. High LDL can raise your risk for heart disease, heart attack, and stroke. HDL is the \"good\" cholesterol. It helps clear bad cholesterol from the body. High HDL is linked with a lower risk of heart disease, heart attack, and stroke. Your cholesterol levels help your doctor find out your risk for having a heart attack or stroke. You and your doctor can talk about whether you need to lower your risk and what treatment is best for you. A heart-healthy lifestyle along with medicines can help lower your cholesterol and your risk. The way you choose to lower your risk will depend on how high your risk is for heart attack and stroke. It will also depend on how you feel about taking medicines. Follow-up care is a key part of your treatment and safety. Be sure to make and go to all appointments, and call your doctor if you are having problems. It's also a good idea to know your test results and keep a list of the medicines you take. How can you care for yourself at home? · Eat a variety of foods every day. Good choices include fruits, vegetables, whole grains (like oatmeal), dried beans and peas, nuts and seeds, soy products (like tofu), and fat-free or low-fat dairy products. · Replace butter, margarine, and hydrogenated or partially hydrogenated oils with olive and canola oils. (Canola oil margarine without trans fat is fine.) · Replace red meat with fish, poultry, and soy protein (like tofu). · Limit processed and packaged foods like chips, crackers, and cookies. · Bake, broil, or steam foods. Don't guerrero them. · Be physically active.  Get at least 30 minutes of exercise on most days of the week. Walking is a good choice. You also may want to do other activities, such as running, swimming, cycling, or playing tennis or team sports. · Stay at a healthy weight or lose weight by making the changes in eating and physical activity listed above. Losing just a small amount of weight, even 5 to 10 pounds, can reduce your risk for having a heart attack or stroke. · Do not smoke. When should you call for help? Watch closely for changes in your health, and be sure to contact your doctor if: 
  · You need help making lifestyle changes.  
  · You have questions about your medicine. Where can you learn more? Go to http://rorySYLOBgabriela.info/. Enter D836 in the search box to learn more about \"High Cholesterol: Care Instructions. \" Current as of: May 10, 2017 Content Version: 11.7 © 1484-8952 [employer]. Care instructions adapted under license by [employer] (which disclaims liability or warranty for this information). If you have questions about a medical condition or this instruction, always ask your healthcare professional. [address] any warranty or liability for your use of this information. Introducing [facility]! Gabi Philip introduces [employer] patient portal. Now you can access parts of your medical record, email your doctor's office, and request medication refills online. 1. In your internet browser, go to [email or web address] 2. Click on the First Time User? Click Here link in the Sign In box. You will see the New Member Sign Up page. 3. Enter your [employer] Access Code exactly as it appears below. You will not need to use this code after youve completed the sign-up process. If you do not sign up before the expiration date, you must request a new code. · [employer] Access Code: [email or web address] Expires: 11/28/2018  8:55 AM 
 
 4. Enter the last four digits of your Social Security Number (xxxx) and Date of Birth (mm/dd/yyyy) as indicated and click Submit. You will be taken to the next sign-up page. 5. Create a [email or web address] ID. This will be your [employer] login ID and cannot be changed, so think of one that is secure and easy to remember. 6. Create a [employer] password. You can change your password at any time. 7. Enter your Password Reset Question and Answer. This can be used at a later time if you forget your password. 8. Enter your e-mail address. You will receive e-mail notification when new information is available in [address] 9. Click Sign Up. You can now view and download portions of your medical record. 10. Click the Download Summary menu link to download a portable copy of your medical information. If you have questions, please visit the Frequently Asked Questions section of the [email or web address] website. Remember, [employer] is NOT to be used for urgent needs. For medical emergencies, dial 911. Now available from your iPhone and Android! Please provide this summary of care documentation to your next provider. Your primary care clinician is listed as MARGARET LAURENT. If you have any questions after today's visit, please call [phone].

## 2018-09-11 LAB
ALBUMIN SERPL-MCNC: 4.7 G/DL (ref 3.6–4.8)
ALBUMIN/GLOB SERPL: 2.1 {RATIO} (ref 1.2–2.2)
ALP SERPL-CCNC: 58 IU/L (ref 39–117)
ALT SERPL-CCNC: 11 IU/L (ref 0–32)
AST SERPL-CCNC: 20 IU/L (ref 0–40)
BILIRUB SERPL-MCNC: 0.5 MG/DL (ref 0–1.2)
BUN SERPL-MCNC: 11 MG/DL (ref 8–27)
BUN/CREAT SERPL: 13 (ref 12–28)
CALCIUM SERPL-MCNC: 9.6 MG/DL (ref 8.7–10.3)
CHLORIDE SERPL-SCNC: 103 MMOL/L (ref 96–106)
CHOLEST SERPL-MCNC: 184 MG/DL (ref 100–199)
CO2 SERPL-SCNC: 22 MMOL/L (ref 20–29)
CREAT SERPL-MCNC: 0.84 MG/DL (ref 0.57–1)
ERYTHROCYTE [DISTWIDTH] IN BLOOD BY AUTOMATED COUNT: 12.4 % (ref 12.3–15.4)
GLOBULIN SER CALC-MCNC: 2.2 G/DL (ref 1.5–4.5)
GLUCOSE SERPL-MCNC: 81 MG/DL (ref 65–99)
HCT VFR BLD AUTO: 40.8 % (ref 34–46.6)
HCV AB S/CO SERPL IA: <0.1 S/CO RATIO (ref 0–0.9)
HDLC SERPL-MCNC: 69 MG/DL
HGB BLD-MCNC: 13.4 G/DL (ref 11.1–15.9)
INTERPRETATION, 910389: NORMAL
LDLC SERPL CALC-MCNC: 103 MG/DL (ref 0–99)
MCH RBC QN AUTO: 27.5 PG (ref 26.6–33)
MCHC RBC AUTO-ENTMCNC: 32.8 G/DL (ref 31.5–35.7)
MCV RBC AUTO: 84 FL (ref 79–97)
PLATELET # BLD AUTO: 260 X10E3/UL (ref 150–379)
POTASSIUM SERPL-SCNC: 4.2 MMOL/L (ref 3.5–5.2)
PROT SERPL-MCNC: 6.9 G/DL (ref 6–8.5)
RBC # BLD AUTO: 4.87 X10E6/UL (ref 3.77–5.28)
SODIUM SERPL-SCNC: 143 MMOL/L (ref 134–144)
T3 SERPL-MCNC: 105 NG/DL (ref 71–180)
T4 FREE SERPL-MCNC: 1.04 NG/DL (ref 0.82–1.77)
TRIGL SERPL-MCNC: 61 MG/DL (ref 0–149)
TSH SERPL DL<=0.005 MIU/L-ACNC: 2.69 UIU/ML (ref 0.45–4.5)
VLDLC SERPL CALC-MCNC: 12 MG/DL (ref 5–40)
WBC # BLD AUTO: 4.9 X10E3/UL (ref 3.4–10.8)

## 2018-10-29 ENCOUNTER — TELEPHONE (OUTPATIENT)
Dept: FAMILY MEDICINE CLINIC | Age: 68
End: 2018-10-29

## 2018-10-29 NOTE — TELEPHONE ENCOUNTER
Went over results with pt.  everything looks good. #'s given. I have put a copy of the labs in the mail per her request.  Needs to f/u in a year, sooner prn. Follow healthy diet and get regular exercise.

## 2018-10-29 NOTE — TELEPHONE ENCOUNTER
Patient is requesting her lab results from 9/10/18  LOV:  August 30, 2018  Best call back : [phone]

## 2018-10-30 NOTE — TELEPHONE ENCOUNTER
I let pt know that Dr Nicholas Lechuga had written a letter and it was sent out on 9/25/18. I let her know that I would send out a copy of the original letter. Confirmed address. Asked her to let me know if she doesn't get it. Pt very appreciative that I let her know of the above.

## 2018-10-30 NOTE — TELEPHONE ENCOUNTER
MJ, I had already done a detailed result letter w/ her results and my recs. It is in encounters and dated for Sept 25. Please call patient back and let her know that I did this back on 9-25-18 and that she should have received that. That was A MONTH AGO! I dont want her to think that we never addressed it or anything. Plus my letter has exactly what I said for her. Please call and let her know and let me know what she says about whether her letter ever came.  I wonder how many other people havent gotten my letters

## 2019-03-29 ENCOUNTER — OFFICE VISIT (OUTPATIENT)
Dept: FAMILY MEDICINE CLINIC | Age: 69
End: 2019-03-29

## 2019-03-29 VITALS
HEART RATE: 103 BPM | HEIGHT: 68 IN | RESPIRATION RATE: 18 BRPM | OXYGEN SATURATION: 97 % | TEMPERATURE: 98 F | SYSTOLIC BLOOD PRESSURE: 118 MMHG | DIASTOLIC BLOOD PRESSURE: 74 MMHG | BODY MASS INDEX: 25.31 KG/M2 | WEIGHT: 167 LBS

## 2019-03-29 DIAGNOSIS — M79.605 LEFT LEG PAIN: ICD-10-CM

## 2019-03-29 DIAGNOSIS — J45.30 MILD PERSISTENT ASTHMA WITHOUT COMPLICATION: Primary | ICD-10-CM

## 2019-03-29 RX ORDER — ALBUTEROL SULFATE 90 UG/1
1 AEROSOL, METERED RESPIRATORY (INHALATION)
Qty: 1 INHALER | Refills: 0 | Status: SHIPPED | OUTPATIENT
Start: 2019-03-29 | End: 2019-03-29 | Stop reason: SDUPTHER

## 2019-03-29 RX ORDER — ALBUTEROL SULFATE 90 UG/1
1 AEROSOL, METERED RESPIRATORY (INHALATION)
Qty: 1 INHALER | Refills: 0 | Status: SHIPPED | OUTPATIENT
Start: 2019-03-29

## 2019-03-29 NOTE — PROGRESS NOTES
Patient Name: Guicho Nguyen   MRN: [id number]    Coralee Schlatter is a 77 y.o. female who presents with the following:     Patient reports chronic history of seasonal allergies and mild asthma. States over the past few weeks, she has noticed worsening shortness of breath, frequent coughing, and sinus congestion. Currently using Qvar once a day without needing any albuterol. Also added Mucinex. Denies any other symptoms such as fever, productive cough, muscle aches, or recent sick contacts. Also has noticed for the past few months that if she sits too long in one position, she will feel a tightness or stiffness behind her left knee upon standing. Massaging it seems to help. Has previously had diffuse myalgias on Zocor but currently doing well on Lipitor. Denies any history of trauma, swelling, injury. Review of Systems   Constitutional: Negative for fever, malaise/fatigue and weight loss. HENT: Positive for congestion. Respiratory: Positive for cough, shortness of breath and wheezing. Negative for hemoptysis and sputum production. Cardiovascular: Negative for chest pain, palpitations, leg swelling and PND. Gastrointestinal: Negative for abdominal pain, constipation, diarrhea, nausea and vomiting. Musculoskeletal: Positive for myalgias. Negative for joint pain. The patient's medications, allergies, past medical history, surgical history, family history and social history were reviewed and updated where appropriate. Prior to Admission medications    Medication Sig Start Date End Date Taking? Authorizing Provider   atorvastatin (LIPITOR) 20 mg tablet TAKE 1 TABLET DAILY 11/24/18  Yes Cholo Wilson MD   Cetirizine (ZYRTEC) 10 mg cap Take  by mouth. Yes Provider, Historical   fluticasone (FLONASE) 50 mcg/actuation nasal spray 2 Sprays by Both Nostrils route daily. Patient taking differently: 2 Sprays by Both Nostrils route daily as needed.  7/10/15  Yes Oumar Russo Amos Hawley MD   DOCOSAHEXANOIC ACID/EPA (FISH OIL PO) Take  by mouth daily. Yes Provider, Historical   Cholecalciferol, Vitamin D3, (VITAMIN D3) 1,000 unit cap Take 1,000 Units by mouth daily. Yes Provider, Historical   beclomethasone (QVAR) 80 mcg/actuation inhaler Take 1 Puff by inhalation two (2) times daily as needed. Yes Provider, Historical   ascorbic acid, vitamin C, (VITAMIN C) 250 mg tablet Take  by mouth. Provider, Historical   loratadine (CLARITIN) 10 mg tablet Take 10 mg by mouth. Provider, Historical   albuterol (PROVENTIL HFA, VENTOLIN HFA, PROAIR HFA) 90 mcg/actuation inhaler Take 1 Puff by inhalation every six (6) hours as needed for Wheezing. 7/10/15   Shobha Erwin MD   CALCIUM CARBONATE/VITAMIN D3 (CALCIUM+D PO) Take  by mouth daily. Includes 400 units vit D    Provider, Historical       Allergies   Allergen Reactions    Pravachol [Pravastatin] Myalgia     40 mg    Zocor [Simvastatin] Myalgia     Tolerated 10 mg fine, but aching on 20 mg           OBJECTIVE    Visit Vitals  /74 (BP 1 Location: Right arm, BP Patient Position: Sitting)   Pulse (!) 103   Temp 98 °F (36.7 °C) (Oral)   Resp 18   Ht 5' 8\" (1.727 m)   Wt 167 lb (75.8 kg)   SpO2 97%   BMI 25.39 kg/m²       Physical Exam   Constitutional: She is oriented to person, place, and time and well-developed, well-nourished, and in no distress. No distress. HENT:   Head: Normocephalic and atraumatic. Right Ear: Tympanic membrane is not perforated and not erythematous. No middle ear effusion. No decreased hearing is noted. Left Ear: Tympanic membrane is not perforated and not erythematous. No middle ear effusion. No decreased hearing is noted. Nose: Nose normal. Right sinus exhibits no maxillary sinus tenderness and no frontal sinus tenderness. Left sinus exhibits no maxillary sinus tenderness and no frontal sinus tenderness.    Mouth/Throat: Uvula is midline, oropharynx is clear and moist and mucous membranes are normal.   Neck: Normal range of motion. Neck supple. Cardiovascular: Normal rate, regular rhythm and normal heart sounds. Exam reveals no gallop and no friction rub. No murmur heard. Pulmonary/Chest: Effort normal. No respiratory distress. She has wheezes (scattered wheezes throughout b/l lung fields). Musculoskeletal:        Right knee: She exhibits normal range of motion, no swelling, no effusion, no ecchymosis, normal patellar mobility and no bony tenderness. No tenderness found. Left knee: She exhibits normal range of motion, no swelling, no effusion, no ecchymosis, no deformity, normal patellar mobility and no bony tenderness. No tenderness (pt reports pain around posterior hamstring when it occurs but denies current pain) found. Lymphadenopathy:     She has no cervical adenopathy. Neurological: She is alert and oriented to person, place, and time. Skin: She is not diaphoretic. Psychiatric: Mood, memory, affect and judgment normal.   Nursing note and vitals reviewed. ASSESSMENT AND PLAN  Jose Chinchilla is a 77 y.o. female who presents today for:    1. Mild persistent asthma without complication  Uncontrolled asthma in the setting of seasonal allergies. Advised her to increase her Qvar to twice daily and to use as needed albuterol. Reviewed signs and symptoms that would indicate a worsening medical condition which would require immediate evaluation and treatment; patient expressed understanding of plan. - albuterol (PROVENTIL HFA, VENTOLIN HFA, PROAIR HFA) 90 mcg/actuation inhaler; Take 1 Puff by inhalation every six (6) hours as needed for Wheezing. Dispense: 1 Inhaler; Refill: 0    2. Left leg pain  Suspect possible hamstring muscle strain. Recommend home exercises, heat therapy, and massage. If persistent, could consider x-ray of left knee.        Medications Discontinued During This Encounter   Medication Reason    loratadine (CLARITIN) 10 mg tablet     albuterol (PROVENTIL HFA, VENTOLIN HFA, PROAIR HFA) 90 mcg/actuation inhaler Reorder    albuterol (PROVENTIL HFA, VENTOLIN HFA, PROAIR HFA) 90 mcg/actuation inhaler Reorder        Time: 25 minutes was spent with this patient face to face discussing test results, follow up visits, and when repeat testing. I discussed diagnoses, risk factors and treatment for each based on current recommendations and literature. Greater than 50% of total visit time was spent in counseling and coordination of care. Medication risks/benefits/costs/interactions/alternatives discussed with patient. Advised patient to call back or return to office if symptoms worsen/change/persist. If patient cannot reach us or should anything more severe/urgent arise he/she should proceed directly to the nearest emergency department. Discussed expected course/resolution/complications of diagnosis in detail with patient. Patient given a written after visit summary which includes his/her diagnoses, current medications and vitals. Patient expressed understanding with the diagnosis and plan. Samm Espana M.D.

## 2019-03-29 NOTE — PATIENT INSTRUCTIONS
Hamstring Strain: Rehab Exercises  Your Care Instructions  Here are some examples of typical rehabilitation exercises for your condition. Start each exercise slowly. Ease off the exercise if you start to have pain. Your doctor or physical therapist will tell you when you can start these exercises and which ones will work best for you. How to do the exercises  Hamstring set (heel dig)    1. Sit with your affected leg bent. Your good leg should be straight and supported on the floor. 2. Tighten the muscles on the back of your bent leg (hamstring) by pressing your heel into the floor. 3. Hold for about 6 seconds, and then rest for up to 10 seconds. 4. Repeat 8 to 12 times. Hamstring curl    1. Lie on your stomach with your knees straight. Place a pillow under your stomach. If your kneecap is uncomfortable, roll up a washcloth and put it under your leg just above your kneecap. 2. Lift the foot of your affected leg by bending your knee so that you bring your foot up toward your buttock. If this motion hurts, try it without bending your knee quite as far. This may help you avoid any painful motion. 3. Slowly move your leg up and down. 4. Repeat 8 to 12 times. 5. When you can do this exercise with ease and no pain, add some resistance. To do this:  6. Tie the ends of an exercise band together to form a loop. Attach one end of the loop to a secure object or shut a door on it to hold it in place. (Or you can have someone hold one end of the loop to provide resistance.)  7. Loop the other end of the exercise band around the lower part of your affected leg. 8. Repeat steps 1 through 4, slowly pulling back on the exercise band with your leg. Hip extension    1. Stand facing a wall with your hands on the wall at about chest level. 2. Keeping the knee of your affected leg straight, kick that leg straight back behind you. 3. Relax, and lower your leg back to the starting position.   4. Repeat 8 to 12 times.  5. When you can do this exercise with ease and no pain, add some resistance. To do this:  6. Tie the ends of an exercise band together to form a loop. Attach one end of the loop to a secure object or shut a door on it to hold it in place. (Or you can have someone hold one end of the loop to provide resistance.)  7. Loop the other end of the exercise band around the lower part of your affected leg. 8. Repeat steps 1 through 4, slowly pulling back on the exercise band with your leg. Hamstring wall stretch    1. Lie on your back in a doorway, with your good leg through the open door. 2. Slide your affected leg up the wall to straighten your knee. You should feel a gentle stretch down the back of your leg. 1. Do not arch your back. 2. Do not bend either knee. 3. Keep one heel touching the floor and the other heel touching the wall. Do not point your toes. 3. Hold the stretch for at least 1 minute to begin. Then try to lengthen the time you hold the stretch to as long as 6 minutes. 4. Repeat 2 to 4 times. 5. If you do not have a place to do this exercise in a doorway, there is another way to do it:  6. Lie on your back, and bend the knee of your affected leg. 7. Loop a towel under the ball and toes of that foot, and hold the ends of the towel in your hands. 8. Straighten your knee, and slowly pull back on the towel. You should feel a gentle stretch down the back of your leg. 9. Hold the stretch for 15 to 30 seconds. Or even better, hold the stretch for 1 minute if you can. 10. Repeat 2 to 4 times. Calf stretch    1. Stand facing a wall with your hands on the wall at about eye level. Put your affected leg about a step behind your other leg. 2. Keeping your back leg straight and your back heel on the floor, bend your front knee and gently bring your hip and chest toward the wall until you feel a stretch in the calf of your back leg. 3. Hold the stretch for 15 to 30 seconds.   4. Repeat 2 to 4 times.  5. Repeat steps 1 through 4, but this time keep your back knee bent. Single-leg balance    1. Stand on a flat surface with your arms stretched out to your sides like you are making the letter \"T. \" Then lift your good leg off the floor, bending it at the knee. If you are not steady on your feet, use one hand to hold on to a chair, counter, or wall. 2. Standing on your affected leg, keep that knee straight. Try to balance on that leg for up to 30 seconds. Then rest for up to 10 seconds. 3. Repeat 6 to 8 times. 4. When you can balance on your affected leg for 30 seconds with your eyes open, try to balance on it with your eyes closed. 5. When you can do this exercise with your eyes closed for 30 seconds and with ease and no pain, try standing on a pillow or piece of foam, and repeat steps 1 through 4. Follow-up care is a key part of your treatment and safety. Be sure to make and go to all appointments, and call your doctor if you are having problems. It's also a good idea to know your test results and keep a list of the medicines you take. Where can you learn more? Go to http://rory-gabriela.info/. Enter 122 [id number] 1171 in the search box to learn more about \"Hamstring Strain: Rehab Exercises. \"  Current as of: September 20, 2018  Content Version: 11.9  © 0428-8269 [employer]. Care instructions adapted under license by [employer] (which disclaims liability or warranty for this information). If you have questions about a medical condition or this instruction, always ask your healthcare professional. [address] any warranty or liability for your use of this information.

## 2019-03-29 NOTE — PROGRESS NOTES
Chief Complaint   Patient presents with    Cough     postnasal drip, constantly having to clear throat     Leg Pain     left thigh pain and stiffness , has been rubbing to help smooth out muscle     Other     overall stiffness      1. Have you been to the ER, urgent care clinic since your last visit? Hospitalized since your last visit? No    2. Have you seen or consulted any other health care providers outside of the [address] [place] [address] since your last visit? Include any pap smears or colon screening.  No

## 2019-08-29 ENCOUNTER — OFFICE VISIT (OUTPATIENT)
Dept: FAMILY MEDICINE CLINIC | Age: 69
End: 2019-08-29

## 2019-08-29 VITALS
HEIGHT: 68 IN | DIASTOLIC BLOOD PRESSURE: 72 MMHG | RESPIRATION RATE: 16 BRPM | HEART RATE: 78 BPM | WEIGHT: 166 LBS | OXYGEN SATURATION: 98 % | BODY MASS INDEX: 25.16 KG/M2 | SYSTOLIC BLOOD PRESSURE: 110 MMHG | TEMPERATURE: 98.2 F

## 2019-08-29 DIAGNOSIS — R94.6 BORDERLINE ABNORMAL THYROID FUNCTION TEST: ICD-10-CM

## 2019-08-29 DIAGNOSIS — Z86.2 HISTORY OF IRON DEFICIENCY ANEMIA: ICD-10-CM

## 2019-08-29 DIAGNOSIS — E78.00 HYPERCHOLESTEROLEMIA: ICD-10-CM

## 2019-08-29 DIAGNOSIS — Z71.89 ACP (ADVANCE CARE PLANNING): ICD-10-CM

## 2019-08-29 DIAGNOSIS — Z00.00 INITIAL MEDICARE ANNUAL WELLNESS VISIT: Primary | ICD-10-CM

## 2019-08-29 PROBLEM — G43.109 OCULAR MIGRAINE: Status: ACTIVE | Noted: 2019-08-29

## 2019-08-29 RX ORDER — ASPIRIN 81 MG/1
TABLET ORAL DAILY
COMMUNITY
End: 2022-10-25 | Stop reason: ALTCHOICE

## 2019-08-29 RX ORDER — BISMUTH SUBSALICYLATE 262 MG
1 TABLET,CHEWABLE ORAL DAILY
COMMUNITY
End: 2020-03-19

## 2019-08-29 NOTE — PATIENT INSTRUCTIONS
Medicare Wellness Visit, Female     The best way to live healthy is to have a lifestyle where you eat a well-balanced diet, exercise regularly, limit alcohol use, and quit all forms of tobacco/nicotine, if applicable. Regular preventive services are another way to keep healthy. Preventive services (vaccines, screening tests, monitoring & exams) can help personalize your care plan, which helps you manage your own care. Screening tests can find health problems at the earliest stages, when they are easiest to treat. [facility] follows the current, evidence-based guidelines published by the [facility] ([facility]) when recommending preventive services for our patients. Because we follow these guidelines, sometimes recommendations change over time as research supports it. (For example, mammograms used to be recommended annually. Even though Medicare will still pay for an annual mammogram, the newer guidelines recommend a mammogram every two years for women of average risk.)  Of course, you and your doctor may decide to screen more often for some diseases, based on your risk and your health status. Preventive services for you include:  - Medicare offers their members a free annual wellness visit, which is time for you and your primary care provider to discuss and plan for your preventive service needs. Take advantage of this benefit every year!  -All adults over the age of 72 should receive the recommended pneumonia vaccines. Current USPSTF guidelines recommend a series of two vaccines for the best pneumonia protection.   -All adults should have a flu vaccine yearly and a tetanus vaccine every 10 years. All adults age 61 and older should receive a shingles vaccine once in their lifetime.    -A bone mass density test is recommended when a woman turns 65 to screen for osteoporosis. This test is only recommended one time, as a screening.  Some providers will use this same test as a disease monitoring tool if you already have osteoporosis. -All adults age 38-68 who are overweight should have a diabetes screening test once every three years.   -Other screening tests and preventive services for persons with diabetes include: an eye exam to screen for diabetic retinopathy, a kidney function test, a foot exam, and stricter control over your cholesterol.   -Cardiovascular screening for adults with routine risk involves an electrocardiogram (ECG) at intervals determined by your doctor.   -Colorectal cancer screenings should be done for adults age 54-65 with no increased risk factors for colorectal cancer. There are a number of acceptable methods of screening for this type of cancer. Each test has its own benefits and drawbacks. Discuss with your doctor what is most appropriate for you during your annual wellness visit. The different tests include: colonoscopy (considered the best screening method), a fecal occult blood test, a fecal DNA test, and sigmoidoscopy. -Breast cancer screenings are recommended every other year for women of normal risk, age 54-69.  -Cervical cancer screenings for women over age 72 are only recommended with certain risk factors.   -All adults born between [facility] should be screened once for Hepatitis C.      Here is a list of your current Health Maintenance items (your personalized list of preventive services) with a due date:  Health Maintenance Due   Topic Date Due    DTaP/Tdap/Td  (1 - Tdap) 02/15/1972    Shingles Vaccine (1 of 2) 02/15/2001    Pneumococcal Vaccine (2 of 2 - PCV13) 09/07/2017

## 2019-08-29 NOTE — PROGRESS NOTES
This is an Initial Medicare Annual Wellness Exam (AWV) (Performed 12 months after IPPE or effective date of Medicare Part B enrollment, Once in a lifetime)    I have reviewed the patient's medical history in detail and updated the computerized patient record.      History     Past Medical History:   Diagnosis Date    Asthma 2011    Borderline abnormal thyroid function test 2014    Depression with anxiety 3/18/2010    Fe deficiency anemia 3/18/2010    H/O mammogram 2016    normal, Dr Blake Baumann History of bone density study 2014    normal, Dr Blake Baumann History of breast cancer, s/p left mastectomy  and 5yr of Tamoxifen 2011    History of iron deficiency anemia 2011    Hypercholesterolemia 2019    IBS (irritable bowel syndrome) 3/18/2010    Microscopic hematuria, benign 2011    Osteoarthritis of right hip 2011    Pap test, as part of routine gynecological examination 10/27/2015    normal Dr Danitza Hightower, repeat 1 yr    Perennial allergic rhinitis 2011    S/P colonoscopic polypectomy, benign 2005 2011    S/P GAUDENCIO (total abdominal hysterectomy) w/ LSO for Uterine Fibroids 2004 2011    Seborrheic keratosis 2011    Vitamin D deficiency 2014      Past Surgical History:   Procedure Laterality Date    HAND/FINGER SURGERY UNLISTED      dislocated thumb surgery repair    HX  SECTION  ,     HX COLONOSCOPY  2015    Repeat 5 yrs/Luis Cruz    HX HYSTERECTOMY  2004    GAUDENCIO-LSO, fibroids    HX MASTECTOMY  2004    left    HX MASTECTOMY  2004    left breast DCIS, total mastectomy and saline implant    HX TUBAL LIGATION      [place] COLONOSCOPY W/BIOPSY SINGLE/MULTIPLE      benign polyp; f/u 5 yrs, Dr Luis Dooley W/BIOPSY SINGLE/MULTIPLE  2005    benign polyp; f/u 5 yrs, Dr Trinidad Obrien THYROID/PARATHYROID/SOFT TISS      normal     Current Outpatient Medications   Medication Sig Dispense Refill    aspirin delayed-release 81 mg tablet Take  by mouth daily.  multivitamin (ONE A DAY) tablet Take 1 Tab by mouth daily.  albuterol (PROVENTIL HFA, VENTOLIN HFA, PROAIR HFA) 90 mcg/actuation inhaler Take 1 Puff by inhalation every six (6) hours as needed for Wheezing. 1 Inhaler 0    atorvastatin (LIPITOR) 20 mg tablet TAKE 1 TABLET DAILY 90 Tab 3    Cetirizine (ZYRTEC) 10 mg cap Take  by mouth daily.  fluticasone (FLONASE) 50 mcg/actuation nasal spray 2 Sprays by Both Nostrils route daily. (Patient taking differently: 2 Sprays by Both Nostrils route daily as needed.) 1 Bottle 0    DOCOSAHEXANOIC ACID/EPA (FISH OIL PO) Take  by mouth daily.  Cholecalciferol, Vitamin D3, (VITAMIN D3) 1,000 unit cap Take 1,000 Units by mouth daily.  beclomethasone (QVAR) 80 mcg/actuation inhaler Take 1 Puff by inhalation two (2) times daily as needed.        Allergies   Allergen Reactions    Pravachol [Pravastatin] Myalgia     40 mg    Zocor [Simvastatin] Myalgia     Tolerated 10 mg fine, but aching on 20 mg     Family History   Problem Relation Age of Onset    Breast Cancer Mother         dx in her 52's,  age 58    Cancer Mother         uterine cancer in her 52's, treated    Hypertension Father     Stroke Father          age 62    Seizures Father     Stroke Maternal Grandmother          in her [de-identified]    Cancer Maternal Grandfather         ?stomach cancer    Heart Disease Maternal Grandfather          in his 66's    Seizures Paternal [de-identified]     Alcohol abuse Brother          age 36 from complications    Breast Cancer Other         maternal 1st cousin    Breast Cancer Paternal Aunt         in her 66's     Social History     Tobacco Use    Smoking status: Never Smoker    Smokeless tobacco: Never Used   Substance Use Topics    Alcohol use: Yes     Comment: occ wine     Patient Active Problem List   Diagnosis Code    IBS (irritable bowel syndrome) K58.9    Perennial allergic rhinitis J30.89    Asthma J45.909    History of iron deficiency anemia Z86.2    Seborrheic keratosis L82.1    Depression with anxiety F41.8    History of breast cancer/DCIS, s/p left mastectomy 2004 and 5yr of Tamoxifen Z85.3    S/P GAUDENCIO (total abdominal hysterectomy) w/ LSO for Uterine Fibroids 2004 Z90.710    S/P colonoscopic polypectomy, benign 2005 Z98.890    Microscopic hematuria, benign R31.29    Osteoarthritis of right hip M16.10    Borderline abnormal thyroid function test R94.6    Vitamin D deficiency E55.9    Hypercholesterolemia E78.00       Depression Risk Factor Screening:     3 most recent PHQ Screens 8/29/2019   Little interest or pleasure in doing things Not at all   Feeling down, depressed, irritable, or hopeless Not at all   Total Score PHQ 2 0       Alcohol Risk Factor Screening: You do not drink alcohol or very rarely. Functional Ability and Level of Safety:     Hearing Loss  Hearing is good. Activities of Daily Living  The home contains: no safety equipment. Patient does total self care  ADL Assessment 8/29/2019   Feeding yourself No Help Needed   Getting from bed to chair No Help Needed   Getting dressed No Help Needed   Bathing or showering No Help Needed   Walk across the room (includes cane/walker) No Help Needed   Using the telphone No Help Needed   Taking your medications No Help Needed   Preparing meals No Help Needed   Managing money (expenses/bills) No Help Needed   Moderately strenuous housework (laundry) No Help Needed   Shopping for personal items (toiletries/medicines) No Help Needed   Shopping for groceries No Help Needed   Driving No Help Needed   Climbing a flight of stairs No Help Needed   Getting to places beyond walking distances No Help Needed       Fall Risk  Fall Risk Assessment, last 12 mths 8/29/2019   Able to walk? -   Fall in past 12 months?  No       Abuse Screen  Patient is not abused    Cognitive Screening   Evaluation of Cognitive Function:  Has your family/caregiver stated any concerns about your memory: no      Patient Care Team   Patient Care Team:  Shawn Reveles MD as PCP - General    Assessment/Plan   Education and counseling provided:  Are appropriate based on today's review and evaluation    Diagnoses and all orders for this visit:    1. Initial Medicare annual wellness visit    ACP: Patient given \"[place] Advanced Directive For Healthcare\" form and/or booklet. Patient advised to follow up with me or contact nurse navigator to review/submit these forms to add to their medical record. I advised patient of the benefits of [address] with regard to end of life care & decision making. The benefits of completing these forms was reviewed w/ patient via 16-30 minute discussion. Questions answered. Concerns addressed.       Health Maintenance Due   Topic Date Due    DTaP/Tdap/Td series (1 - Tdap) 02/15/1972    Shingrix Vaccine Age 50> (1 of 2) 02/15/2001

## 2019-08-29 NOTE — PROGRESS NOTES
Chief Complaint   Patient presents with    Physical     pt is fasting today    Annual Wellness Visit          \"REVIEWED RECORD IN PREPARATION FOR VISIT AND HAVE OBTAINED THE NECESSARY DOCUMENTATION\"  1. Have you been to the ER, urgent care clinic since your last visit? Hospitalized since your last visit? No    2. Have you seen or consulted any other health care providers outside of the [address] [place] [address] since your last visit? Include any pap smears or colon screening.  No

## 2019-08-29 NOTE — PROGRESS NOTES
Chief Complaint   Patient presents with   Dean Annual Wellness Visit         Cholesterol Problem     fasting follow up     HISTORY OF PRESENT ILLNESS   HPI  Fasting follow up hypercholesterolemia, labs, and medication check. Doing well on current regimen of Lipitor daily. Refer to diet/exercise habits under social hx below. Wt stable. In general has been feeling pretty well. She saw Dr. Sonia Bean back in 3-2019 for some aching pains in the back of her left lower thigh region. Felt to be more of a muscular type strain. She rested and massaged it a bit for a while. It is a lot better but still bothers her from time to time. No swelling. ROM good and intake. Does not bother her to walk or exercise. Rubbing/massaging makes it feel better. She has been rubbing CBD cream to the area topically the past month and that has really helped. When I looked at the area there is a localized region of spider veins and mild varicosities in the sore area. She reports that she used to have this a lot several years ago and ended up getting them \"injected\" because they were so unsightly and bothered her. She has a vascular doctor but isnt interested in pursuing that right now since it doesn't bother her much at present and not worried about vanity like she was when she was younger. She wants to just continue to watch it for now. REVIEW OF SYMPTOMS   Review of Systems   Constitutional: Negative. HENT: Negative. Respiratory: Negative. Cardiovascular: Negative. Gastrointestinal: Negative. Genitourinary: Negative. Musculoskeletal: Negative for myalgias. Neurological: Negative. Psychiatric/Behavioral: Negative.             PROBLEM LIST/MEDICAL HISTORY     Problem List  Date Reviewed: 8/29/2019          Codes Class Noted    Hypercholesterolemia ICD-10-CM: E78.00  ICD-9-CM: 272.0  8/29/2019        Ocular migraine ICD-10-CM: P76.308  ICD-9-CM: 346.80  8/29/2019    Overview Signed 8/29/2019 11:56 AM by Sameera Aguilera MD     Diagnosed by Ophthalmologist  [phone]             ACP (advance care planning) ICD-10-CM: Z71.89  ICD-9-CM: V65.49  8/29/2019    Overview Signed 8/29/2019  1:44 PM by Sameera Aguilera MD     ACP discussion [phone], AMD form given             Borderline abnormal thyroid function test ICD-10-CM: R94.6  ICD-9-CM: 794.5  1/7/2014    Overview Signed 1/7/2014  9:54 PM by Sameera Aguilera MD     1/2014             Vitamin D deficiency ICD-10-CM: E55.9  ICD-9-CM: 268.9  1/7/2014        Perennial allergic rhinitis ICD-10-CM: J30.89  ICD-9-CM: 477.8  9/14/2011    Overview Addendum 9/14/2011  1:34 PM by Sameera Aguilera MD     2011 Allergist Dr Smith; dust, trees, mold, fall time             Asthma ICD-10-CM: J45.909  ICD-9-CM: 493.90  9/14/2011    Overview Signed 9/14/2011  9:28 AM by Sameera Aguilera MD     PFT's allergist office July 2011, Dr Smith             History of iron deficiency anemia ICD-10-CM: Z86.2  ICD-9-CM: V12.3  9/14/2011        Seborrheic keratosis ICD-10-CM: L82.1  ICD-9-CM: 702.19  9/14/2011    Overview Signed 9/14/2011  9:29 AM by Sameera Aguilera MD     Dermatology, Dr Graf Hein             Depression with anxiety ICD-10-CM: F41.8  ICD-9-CM: 300.4  9/14/2011    Overview Signed 9/14/2011  1:32 PM by Sameera Aguilera MD     [id number]             History of breast cancer/DCIS, s/p left mastectomy 2004 and 5yr of Tamoxifen ICD-10-CM: Z85.3  ICD-9-CM: V10.3  9/14/2011    Overview Addendum 9/14/2011  1:34 PM by Sameera Aguilera MD     DCIS;  Onc Dr Ev Harp             S/P GAUDENCIO (total abdominal hysterectomy) w/ LSO for Uterine Fibroids 2004 ICD-10-CM: Z90.710  ICD-9-CM: V88.01  9/14/2011        S/P colonoscopic polypectomy, benign 2005 ICD-10-CM: Z98.890  ICD-9-CM: V45.89  9/14/2011    Overview Signed 9/14/2011  1:35 PM by MD Dr Carol Chacon             Microscopic hematuria, benign ICD-10-CM: R31.29  ICD-9-CM: 599.72  2011    Overview Addendum 10/17/2014 10:51 AM by Faisal Valdes MD     Urology, Dr Earnest Balbuena, 2008; FISH test negative and h/o cystoscopy as well             Osteoarthritis of right hip ICD-10-CM: M16.10  ICD-9-CM: 716.95  2011    Overview Signed 2011  1:39 PM by Faisal Valdes MD     Xrays 2005             IBS (irritable bowel syndrome) (Chronic) ICD-10-CM: K58.9  ICD-9-CM: 564.1  3/18/2010                  PAST SURGICAL HISTORY     Past Surgical History:   Procedure Laterality Date    HAND/FINGER SURGERY UNLISTED      dislocated thumb surgery repair    HX  SECTION  ,     HX COLONOSCOPY  2015    Repeat 5 yrs/Luis Cruz    HX HYSTERECTOMY  2004    GAUDENCIO-LSO, fibroids    HX MASTECTOMY      left    HX MASTECTOMY  2004    left breast DCIS, total mastectomy and saline implant    HX TUBAL LIGATION      [place] COLONOSCOPY W/BIOPSY SINGLE/MULTIPLE      benign polyp; f/u 5 yrs, Dr Johnny Cushing  2005    benign polyp; f/u 5 yrs, Dr Olivia Wang THYROID/PARATHYROID/SOFT TISS      normal         MEDICATIONS     Current Outpatient Medications   Medication Sig    aspirin delayed-release 81 mg tablet Take  by mouth daily.  multivitamin (ONE A DAY) tablet Take 1 Tab by mouth daily.  albuterol (PROVENTIL HFA, VENTOLIN HFA, PROAIR HFA) 90 mcg/actuation inhaler Take 1 Puff by inhalation every six (6) hours as needed for Wheezing.  atorvastatin (LIPITOR) 20 mg tablet TAKE 1 TABLET DAILY    Cetirizine (ZYRTEC) 10 mg cap Take  by mouth daily.  fluticasone (FLONASE) 50 mcg/actuation nasal spray 2 Sprays by Both Nostrils route daily. (Patient taking differently: 2 Sprays by Both Nostrils route daily as needed.)    DOCOSAHEXANOIC ACID/EPA (FISH OIL PO) Take  by mouth daily.  Cholecalciferol, Vitamin D3, (VITAMIN D3) 1,000 unit cap Take 1,000 Units by mouth daily.     beclomethasone (QVAR) 80 mcg/actuation inhaler Take 1 Puff by inhalation two (2) times daily as needed. No current facility-administered medications for this visit.            ALLERGIES     Allergies   Allergen Reactions    Pravachol [Pravastatin] Myalgia     40 mg    Zocor [Simvastatin] Myalgia     Tolerated 10 mg fine, but aching on 20 mg          SOCIAL HISTORY     Social History     Socioeconomic History    Marital status:      Spouse name: Not on file    Number of children: Not on file    Years of education: Not on file    Highest education level: Not on file   Occupational History    Occupation: [employer]   Tobacco Use    Smoking status: Never Smoker    Smokeless tobacco: Never Used   Substance and Sexual Activity    Alcohol use: Yes     Comment: occ wine    Drug use: No    Sexual activity: Yes     Partners: Male   Other Topics Concern    Caffeine Concern No     Comment: 1 cup decaff coffee a day     Weight Concern No     Comment: happy w/ better diet and increased exercise she has gotten her wt down from 174 to 160 lbs range    Special Diet Yes     Comment: Spring 2014: restarted Weight Watchers which she has done on and off since ~     Exercise Yes     Comment: was walking alot, exercise videos and yoga, stopped over summer, just restarting now: walking tape 15-20 minutes building up to 3 x a week; yoga once a week building up to 2-3 x a week        IMMUNIZATIONS  Immunization History   Administered Date(s) Administered    Influenza Vaccine 10/01/2013    Pneumococcal Conjugate (PCV-13) 2017    Pneumococcal Polysaccharide (PPSV-23) 2016         FAMILY HISTORY     Family History   Problem Relation Age of Onset    Breast Cancer Mother         dx in her 52's,  age 58    Cancer Mother         uterine cancer in her 52's, treated    Hypertension Father     Stroke Father          age 62    Seizures Father     Stroke Maternal Grandmother          in her [de-identified]    Cancer Maternal Grandfather         ?stomach cancer    Heart Disease Maternal Grandfather          in his 66's    Seizures Paternal Aunt     Alcohol abuse Brother          age 36 from complications    Breast Cancer Other         maternal 1st cousin    Breast Cancer Paternal Aunt         in her 66's         VITALS     Visit Vitals  /72 (BP 1 Location: Left arm, BP Patient Position: Sitting)   Pulse 78   Temp 98.2 °F (36.8 °C) (Oral)   Resp 16   Ht 5' 8\" (1.727 m)   Wt 166 lb (75.3 kg)   SpO2 98%   BMI 25.24 kg/m²          PHYSICAL EXAMINATION   Physical Exam   Constitutional: She is oriented to person, place, and time and well-developed, well-nourished, and in no distress. HENT:   Right Ear: Tympanic membrane normal.   Left Ear: Tympanic membrane normal.   Mouth/Throat: Oropharynx is clear and moist. No oropharyngeal exudate. Eyes: Pupils are equal, round, and reactive to light. Conjunctivae and EOM are normal.   Neck: Neck supple. No JVD present. Carotid bruit is not present. No thyromegaly present. Cardiovascular: Normal rate, regular rhythm and normal heart sounds. No murmur heard. Pulmonary/Chest: Effort normal and breath sounds normal.   Abdominal: Soft. Bowel sounds are normal. She exhibits no distension and no mass. There is no tenderness. Musculoskeletal: Normal range of motion. She exhibits no edema or tenderness. Lymphadenopathy:     She has no cervical adenopathy. Neurological: She is alert and oriented to person, place, and time. Gait normal. Coordination normal.   Psychiatric: Mood and affect normal.   Vitals reviewed.           DIAGNOSTIC DATA         LABORATORY DATA     Results for orders placed or performed in visit on 18   LIPID PANEL   Result Value Ref Range    Cholesterol, total 184 100 - 199 mg/dL    Triglyceride 61 0 - 149 mg/dL    HDL Cholesterol 69 >39 mg/dL    VLDL, calculated 12 5 - 40 mg/dL    LDL, calculated 103 (H) 0 - 99 mg/dL   METABOLIC PANEL, COMPREHENSIVE Result Value Ref Range    Glucose 81 65 - 99 mg/dL    BUN 11 8 - 27 mg/dL    Creatinine 0.84 0.57 - 1.00 mg/dL    GFR est non-AA 72 >59 mL/min/1.73    GFR est AA 83 >59 mL/min/1.73    BUN/Creatinine ratio 13 12 - 28    Sodium 143 134 - 144 mmol/L    Potassium 4.2 3.5 - 5.2 mmol/L    Chloride 103 96 - 106 mmol/L    CO2 22 20 - 29 mmol/L    Calcium 9.6 8.7 - 10.3 mg/dL    Protein, total 6.9 6.0 - 8.5 g/dL    Albumin 4.7 3.6 - 4.8 g/dL    GLOBULIN, TOTAL 2.2 1.5 - 4.5 g/dL    A-G Ratio 2.1 1.2 - 2.2    Bilirubin, total 0.5 0.0 - 1.2 mg/dL    Alk. phosphatase 58 39 - 117 IU/L    AST (SGOT) 20 0 - 40 IU/L    ALT (SGPT) 11 0 - 32 IU/L   CBC W/O DIFF   Result Value Ref Range    WBC 4.9 3.4 - 10.8 x10E3/uL    RBC 4.87 3.77 - 5.28 x10E6/uL    HGB 13.4 11.1 - 15.9 g/dL    HCT 40.8 34.0 - 46.6 %    MCV 84 79 - 97 fL    MCH 27.5 26.6 - 33.0 pg    MCHC 32.8 31.5 - 35.7 g/dL    RDW 12.4 12.3 - 15.4 %    PLATELET 656 499 - 922 x10E3/uL   T4, FREE   Result Value Ref Range    T4, Free 1.04 0.82 - 1.77 ng/dL   TSH 3RD GENERATION   Result Value Ref Range    TSH 2.690 0.450 - 4.500 uIU/mL   T3 TOTAL   Result Value Ref Range    T3, total 105 71 - 180 ng/dL   HEPATITIS C AB   Result Value Ref Range    Hep C Virus Ab <0.1 0.0 - 0.9 s/co ratio   CVD REPORT   Result Value Ref Range    INTERPRETATION Note           ASSESSMENT & PLAN       ICD-10-CM ICD-9-CM    1. Initial Medicare annual wellness visit Z00.00 V70.0 See separate note under this same encounter visit for Medicare Wellness note. 2. ACP (advance care planning) Z71.89 V65.49 See separate ACP note   3. Hypercholesterolemia E78.00 272.0 LIPID PANEL      METABOLIC PANEL, COMPREHENSIVE   4. Borderline abnormal thyroid function test R94.6 794.5 TSH 3RD GENERATION   5.  History of iron deficiency anemia Z86.2 V12.3 CBC W/O DIFF     Fasting labs today  Cardiovascular risk and specific lipid/LDL goals reviewed  Reviewed medications and side effects, doing well on current regimen  Reviewed diet, nutrition, exercise, weight management, BMI/goals, age/risk based screening recommendations, health maintenance & prevention counseling. Cancer screening USPTFS guidelines reviewed w/ pt today. Discussed benefits/positive/negative outcomes of screening based on age/risk stratification. Informed consent for/against screening based on pt's personal hx/risk factors. Updated in history above and health maintenance. Sees gyn annually in the fall. Mammogram scheduled for 9-3-19 at [facility]. Colonoscopy due next year. Further follow up & other recommendations pending review of labs.  If all remains good and stable, follow up in 1 yr, sooner prn

## 2019-08-30 LAB
ALBUMIN SERPL-MCNC: 5 G/DL (ref 3.6–4.8)
ALBUMIN/GLOB SERPL: 2.1 {RATIO} (ref 1.2–2.2)
ALP SERPL-CCNC: 56 IU/L (ref 39–117)
ALT SERPL-CCNC: 11 IU/L (ref 0–32)
AST SERPL-CCNC: 18 IU/L (ref 0–40)
BILIRUB SERPL-MCNC: 0.4 MG/DL (ref 0–1.2)
BUN SERPL-MCNC: 10 MG/DL (ref 8–27)
BUN/CREAT SERPL: 11 (ref 12–28)
CALCIUM SERPL-MCNC: 9.9 MG/DL (ref 8.7–10.3)
CHLORIDE SERPL-SCNC: 103 MMOL/L (ref 96–106)
CHOLEST SERPL-MCNC: 199 MG/DL (ref 100–199)
CO2 SERPL-SCNC: 25 MMOL/L (ref 20–29)
CREAT SERPL-MCNC: 0.92 MG/DL (ref 0.57–1)
ERYTHROCYTE [DISTWIDTH] IN BLOOD BY AUTOMATED COUNT: 12.3 % (ref 12.3–15.4)
GLOBULIN SER CALC-MCNC: 2.4 G/DL (ref 1.5–4.5)
GLUCOSE SERPL-MCNC: 86 MG/DL (ref 65–99)
HCT VFR BLD AUTO: 42.8 % (ref 34–46.6)
HDLC SERPL-MCNC: 77 MG/DL
HGB BLD-MCNC: 14.3 G/DL (ref 11.1–15.9)
INTERPRETATION, 910389: NORMAL
LDLC SERPL CALC-MCNC: 109 MG/DL (ref 0–99)
MCH RBC QN AUTO: 27.5 PG (ref 26.6–33)
MCHC RBC AUTO-ENTMCNC: 33.4 G/DL (ref 31.5–35.7)
MCV RBC AUTO: 82 FL (ref 79–97)
PLATELET # BLD AUTO: 261 X10E3/UL (ref 150–450)
POTASSIUM SERPL-SCNC: 4.4 MMOL/L (ref 3.5–5.2)
PROT SERPL-MCNC: 7.4 G/DL (ref 6–8.5)
RBC # BLD AUTO: 5.2 X10E6/UL (ref 3.77–5.28)
SODIUM SERPL-SCNC: 144 MMOL/L (ref 134–144)
TRIGL SERPL-MCNC: 66 MG/DL (ref 0–149)
TSH SERPL DL<=0.005 MIU/L-ACNC: 2.33 UIU/ML (ref 0.45–4.5)
VLDLC SERPL CALC-MCNC: 13 MG/DL (ref 5–40)
WBC # BLD AUTO: 5.8 X10E3/UL (ref 3.4–10.8)

## 2020-03-19 ENCOUNTER — TELEPHONE (OUTPATIENT)
Dept: FAMILY MEDICINE CLINIC | Age: 70
End: 2020-03-19

## 2020-03-19 ENCOUNTER — OFFICE VISIT (OUTPATIENT)
Dept: FAMILY MEDICINE CLINIC | Age: 70
End: 2020-03-19

## 2020-03-19 VITALS
BODY MASS INDEX: 25.52 KG/M2 | WEIGHT: 168.4 LBS | DIASTOLIC BLOOD PRESSURE: 62 MMHG | HEART RATE: 102 BPM | OXYGEN SATURATION: 98 % | SYSTOLIC BLOOD PRESSURE: 128 MMHG | HEIGHT: 68 IN | TEMPERATURE: 98.6 F | RESPIRATION RATE: 16 BRPM

## 2020-03-19 DIAGNOSIS — H61.23 BILATERAL IMPACTED CERUMEN: Primary | ICD-10-CM

## 2020-03-19 NOTE — TELEPHONE ENCOUNTER
Called pt ans she states that over the last couple of week that she has been having a throbbing sound or beat in her L ear. She doesn't notice it so much during the day when there is a lot of things going on. She is more aware of it at night when it is quiet. It doesn't hurt but it is annoying. She started putting debrox in it yesterday day. She has had to come in to get her ear flushed before. Booked pt for and appt today.

## 2020-03-19 NOTE — TELEPHONE ENCOUNTER
----- Message from David De La Rosa sent at 3/19/2020  9:28 AM EDT -----  Regarding: Dr. Jean Reaves first and last name: Derick Benitezfantasma  Reason for call:   pt has ear infection since earlier this week.  Pulsing in left ear is bothering her sleeping patterns  Callback required yes/no and why: yes  Best contact number(s):  [phone]  Details to clarify the request:

## 2020-03-19 NOTE — PROGRESS NOTES
Chief Complaint   Patient presents with    Ear Fullness     lef     1. Have you been to the ER, urgent care clinic since your last visit? Hospitalized since your last visit? No    2. Have you seen or consulted any other health care providers outside of the [address] [place] [address] since your last visit? Include any pap smears or colon screening.  No

## 2020-03-19 NOTE — PROGRESS NOTES
Chief Complaint   Patient presents with    Ear Fullness     left ear x 2 days   HISTORY OF PRESENT ILLNESS  Getachew De La Rosa is a 72 y.o. female. HPI  Patient w/ h/o recurrent B cerumen impaction presents w/ c/o 2 day h//o left ear fullness. Feels clogged, stopped up. Decreased hearing from both ears. Yesterday used \"ear drops for wax\". No help. Has had ears lavaged multiple times in the past w/ good results. Review of Systems   Constitutional: Negative. Negative for fever. HENT: Negative for ear discharge, ear pain, sinus pain and sore throat. Respiratory: Negative. Cardiovascular: Negative. Neurological: Negative. Negative for dizziness and headaches. OTHER PMHX/PSHX/MEDS/ALLG/SOCHX REVIEWED AND NOTED IN CHART  Current Outpatient Medications   Medication Sig    atorvastatin (LIPITOR) 20 mg tablet TAKE 1 TABLET DAILY    aspirin delayed-release 81 mg tablet Take  by mouth daily.  albuterol (PROVENTIL HFA, VENTOLIN HFA, PROAIR HFA) 90 mcg/actuation inhaler Take 1 Puff by inhalation every six (6) hours as needed for Wheezing.  Cetirizine (ZYRTEC) 10 mg cap Take  by mouth daily.  fluticasone (FLONASE) 50 mcg/actuation nasal spray 2 Sprays by Both Nostrils route daily. (Patient taking differently: 2 Sprays by Both Nostrils route daily as needed.)    DOCOSAHEXANOIC ACID/EPA (FISH OIL PO) Take  by mouth daily.  Cholecalciferol, Vitamin D3, (VITAMIN D3) 1,000 unit cap Take 1,000 Units by mouth daily.  beclomethasone (QVAR) 80 mcg/actuation inhaler Take 1 Puff by inhalation two (2) times daily as needed. No current facility-administered medications for this visit.       Allergies   Allergen Reactions    Pravachol [Pravastatin] Myalgia     40 mg    Zocor [Simvastatin] Myalgia     Tolerated 10 mg fine, but aching on 20 mg     Visit Vitals  /62 (BP 1 Location: Left arm, BP Patient Position: Sitting)   Pulse (!) 102   Temp 98.6 °F (37 °C) (Oral)   Resp 16   Ht 5' 8\" (1.727 m) Wt 168 lb 6.4 oz (76.4 kg)   SpO2 98%   BMI 25.61 kg/m²     Physical Exam  Vitals signs reviewed. Constitutional:       General: She is not in acute distress. Appearance: Normal appearance. She is not ill-appearing. HENT:      Right Ear: There is impacted cerumen. Left Ear: There is impacted cerumen. Ears:      Comments: Both ears lavaged in office today. Did require some assisted manual disimpaction of right ear by MD and left ear by nurse. TM exam normal and intact after successful lavage. ASSESSMENT and PLAN    ICD-10-CM ICD-9-CM    1. Bilateral impacted cerumen H61.23 380.4 REMOVE IMPACTED EAR WAX     Successful B ear lavage in office today. Tolerated well w/o sequelae. Preventive measures and home care instructions w/ pt in office today. Return prn.

## 2020-08-31 LAB
SARS-COV-2, NAA: NORMAL
SARS-COV-2, NAA: NOT DETECTED

## 2020-09-14 ENCOUNTER — APPOINTMENT (OUTPATIENT)
Dept: FAMILY MEDICINE CLINIC | Age: 70
End: 2020-09-14

## 2020-09-14 ENCOUNTER — OFFICE VISIT (OUTPATIENT)
Dept: FAMILY MEDICINE CLINIC | Age: 70
End: 2020-09-14
Payer: MEDICARE

## 2020-09-14 VITALS
HEIGHT: 68 IN | SYSTOLIC BLOOD PRESSURE: 131 MMHG | DIASTOLIC BLOOD PRESSURE: 77 MMHG | TEMPERATURE: 97.1 F | BODY MASS INDEX: 25.73 KG/M2 | HEART RATE: 78 BPM | RESPIRATION RATE: 24 BRPM | OXYGEN SATURATION: 98 % | WEIGHT: 169.8 LBS

## 2020-09-14 DIAGNOSIS — Z86.2 HISTORY OF IRON DEFICIENCY ANEMIA: ICD-10-CM

## 2020-09-14 DIAGNOSIS — E55.9 VITAMIN D DEFICIENCY: ICD-10-CM

## 2020-09-14 DIAGNOSIS — Z00.00 MEDICARE ANNUAL WELLNESS VISIT, SUBSEQUENT: Primary | ICD-10-CM

## 2020-09-14 DIAGNOSIS — R31.29 MICROSCOPIC HEMATURIA: ICD-10-CM

## 2020-09-14 DIAGNOSIS — E78.00 HYPERCHOLESTEROLEMIA: ICD-10-CM

## 2020-09-14 DIAGNOSIS — Z23 ENCOUNTER FOR IMMUNIZATION: ICD-10-CM

## 2020-09-14 PROCEDURE — G0008 ADMIN INFLUENZA VIRUS VAC: HCPCS | Performed by: FAMILY MEDICINE

## 2020-09-14 PROCEDURE — G0439 PPPS, SUBSEQ VISIT: HCPCS | Performed by: FAMILY MEDICINE

## 2020-09-14 PROCEDURE — 90653 IIV ADJUVANT VACCINE IM: CPT

## 2020-09-14 PROCEDURE — 99214 OFFICE O/P EST MOD 30 MIN: CPT | Performed by: FAMILY MEDICINE

## 2020-09-14 RX ORDER — ATORVASTATIN CALCIUM 20 MG/1
TABLET, FILM COATED ORAL
Qty: 90 TAB | Refills: 3 | Status: CANCELLED | OUTPATIENT
Start: 2020-09-14

## 2020-09-14 RX ORDER — BISMUTH SUBSALICYLATE 262 MG
1 TABLET,CHEWABLE ORAL DAILY
COMMUNITY
End: 2021-11-08 | Stop reason: ALTCHOICE

## 2020-09-14 NOTE — PATIENT INSTRUCTIONS
Medicare Wellness Visit, Female     The best way to live healthy is to have a lifestyle where you eat a well-balanced diet, exercise regularly, limit alcohol use, and quit all forms of tobacco/nicotine, if applicable. Regular preventive services are another way to keep healthy. Preventive services (vaccines, screening tests, monitoring & exams) can help personalize your care plan, which helps you manage your own care. Screening tests can find health problems at the earliest stages, when they are easiest to treat. [facility] follows the current, evidence-based guidelines published by the [facility] ([facility]) when recommending preventive services for our patients. Because we follow these guidelines, sometimes recommendations change over time as research supports it. (For example, mammograms used to be recommended annually. Even though Medicare will still pay for an annual mammogram, the newer guidelines recommend a mammogram every two years for women of average risk). Of course, you and your doctor may decide to screen more often for some diseases, based on your risk and your co-morbidities (chronic disease you are already diagnosed with). Preventive services for you include:  - Medicare offers their members a free annual wellness visit, which is time for you and your primary care provider to discuss and plan for your preventive service needs. Take advantage of this benefit every year!  -All adults over the age of 72 should receive the recommended pneumonia vaccines. Current USPSTF guidelines recommend a series of two vaccines for the best pneumonia protection.   -All adults should have a flu vaccine yearly and a tetanus vaccine every 10 years.   -All adults age 48 and older should receive the shingles vaccines (series of two vaccines).       -All adults age 38-68 who are overweight should have a diabetes screening test once every three years.   -All adults born between [birth year] and 1965 should be screened once for Hepatitis C.  -Other screening tests and preventive services for persons with diabetes include: an eye exam to screen for diabetic retinopathy, a kidney function test, a foot exam, and stricter control over your cholesterol.   -Cardiovascular screening for adults with routine risk involves an electrocardiogram (ECG) at intervals determined by your doctor.   -Colorectal cancer screenings should be done for adults age 54-65 with no increased risk factors for colorectal cancer. There are a number of acceptable methods of screening for this type of cancer. Each test has its own benefits and drawbacks. Discuss with your doctor what is most appropriate for you during your annual wellness visit. The different tests include: colonoscopy (considered the best screening method), a fecal occult blood test, a fecal DNA test, and sigmoidoscopy.    -A bone mass density test is recommended when a woman turns 65 to screen for osteoporosis. This test is only recommended one time, as a screening. Some providers will use this same test as a disease monitoring tool if you already have osteoporosis. -Breast cancer screenings are recommended every other year for women of normal risk, age 54-69.  -Cervical cancer screenings for women over age 72 are only recommended with certain risk factors.      Here is a list of your current Health Maintenance items (your personalized list of preventive services) with a due date:  Health Maintenance Due   Topic Date Due    DTaP/Tdap/Td  (1 - Tdap) 02/15/1972    Shingles Vaccine (1 of 2) 02/15/2001    Mammogram  07/23/2019    Colonoscopy  06/08/2020    Yearly Flu Vaccine (1) 09/01/2020    Cholesterol Test   08/29/2020

## 2020-09-14 NOTE — PROGRESS NOTES
Chief Complaint   Patient presents with   [facility] Annual Wellness Visit       1. Have you been to the ER, urgent care clinic since your last visit? Hospitalized since your last visit? No    2. Have you seen or consulted any other health care providers outside of the [address] [place] [address] since your last visit? Include any pap smears or colon screening. No      3 most recent PHQ Screens 9/14/2020   Little interest or pleasure in doing things Not at all   Feeling down, depressed, irritable, or hopeless Not at all   Total Score PHQ 2 0       Abuse Screening Questionnaire 9/14/2020   Do you ever feel afraid of your partner? N   Are you in a relationship with someone who physically or mentally threatens you? N   Is it safe for you to go home? Y       ADL Assessment 9/14/2020   Feeding yourself No Help Needed   Getting from bed to chair No Help Needed   Getting dressed No Help Needed   Bathing or showering No Help Needed   Walk across the room (includes cane/walker) No Help Needed   Using the telphone No Help Needed   Taking your medications No Help Needed   Preparing meals No Help Needed   Managing money (expenses/bills) No Help Needed   Moderately strenuous housework (laundry) No Help Needed   Shopping for personal items (toiletries/medicines) No Help Needed   Shopping for groceries No Help Needed   Driving No Help Needed   Climbing a flight of stairs No Help Needed   Getting to places beyond walking distances No Help Needed       Fall Risk Assessment, last 12 mths 9/14/2020   Able to walk? Yes   Fall in past 12 months?  No   Fall with injury? -   Number of falls in past 12 months -   Fall Risk Score -

## 2020-09-14 NOTE — PROGRESS NOTES
This is the Subsequent Medicare Annual Wellness Exam, performed 12 months or more after the Initial AWV or the last Subsequent AWV    I have reviewed the patient's medical history in detail and updated the computerized patient record. History     Patient Active Problem List   Diagnosis Code    IBS (irritable bowel syndrome) K58.9    Perennial allergic rhinitis J30.89    Asthma J45.909    History of iron deficiency anemia Z86.2    Seborrheic keratosis L82.1    Depression with anxiety F41.8    History of breast cancer/DCIS, s/p left mastectomy 2004 and 5yr of Tamoxifen Z85.3    S/P GAUDENCIO (total abdominal hysterectomy) w/ LSO for Uterine Fibroids 2004 Z90.710    S/P colonoscopic polypectomy, benign 2005 Z98.890    Microscopic hematuria, benign R31.29    Osteoarthritis of right hip M16.10    Borderline abnormal thyroid function test R94.6    Vitamin D deficiency E55.9    Hypercholesterolemia E78.00    Ocular migraine G43. 109    ACP (advance care planning) Z71.89     Past Medical History:   Diagnosis Date    ACP (advance care planning) 8/29/2019    ACP discussion 8-2019, AMD form given    Asthma 9/14/2011    Borderline abnormal thyroid function test 1/7/2014 1/2014    Depression with anxiety 3/18/2010    Fe deficiency anemia 3/18/2010    H/O mammogram 05/23/2016    normal, Dr Shankar Swanson History of bone density study 11/19/2014    normal, Dr Shankar Swanson History of breast cancer, s/p left mastectomy 2004 and 5yr of Tamoxifen 9/14/2011    History of iron deficiency anemia 9/14/2011    Hypercholesterolemia 8/29/2019    IBS (irritable bowel syndrome) 3/18/2010    Microscopic hematuria, benign 9/14/2011    Ocular migraine 8/29/2019    Diagnosed by Ophthalmologist  [phone]    Osteoarthritis of right hip 9/14/2011    Pap test, as part of routine gynecological examination 10/27/2015    normal Dr Jamison Gonsalez, repeat 1 yr    Perennial allergic rhinitis 9/14/2011    S/P colonoscopic polypectomy, benign 2011    S/P GAUDENCIO (total abdominal hysterectomy) w/ LSO for Uterine Fibroids 2011    Seborrheic keratosis 2011    Vitamin D deficiency 2014      Past Surgical History:   Procedure Laterality Date    HAND/FINGER SURGERY UNLISTED      dislocated thumb surgery repair    HX  SECTION  ,     HX COLONOSCOPY  2015    Repeat 5 yrs/Luis Cruz    HX COLONOSCOPY  2020    Polyp, Repeat 5 yrs, Dr. Brett Knight HX HYSTERECTOMY  2004    GAUDENCIO-LSO, fibroids    HX MASTECTOMY      left    HX MASTECTOMY  2004    left breast DCIS, total mastectomy and saline implant    HX TUBAL LIGATION      LA COLONOSCOPY W/BIOPSY SINGLE/MULTIPLE      benign polyp; f/u 5 yrs, Dr Darcie Heimlich  2005    benign polyp; f/u 5 yrs, Dr Chely Montgomery THYROID/PARATHYROID/SOFT TISS      normal     Current Outpatient Medications   Medication Sig Dispense Refill    multivitamin (ONE A DAY) tablet Take 1 Tab by mouth daily. Takes ~ 3 x a week      atorvastatin (LIPITOR) 20 mg tablet TAKE 1 TABLET DAILY 90 Tab 3    aspirin delayed-release 81 mg tablet Take  by mouth daily.  Cetirizine (ZYRTEC) 10 mg cap Take  by mouth daily.  fluticasone (FLONASE) 50 mcg/actuation nasal spray 2 Sprays by Both Nostrils route daily. (Patient taking differently: 2 Sprays by Both Nostrils route daily as needed.) 1 Bottle 0    DOCOSAHEXANOIC ACID/EPA (FISH OIL PO) Take 1 Cap by mouth daily. Takes ~ 3 times a week      Cholecalciferol, Vitamin D3, (VITAMIN D3) 1,000 unit cap Take 1,000 Units by mouth daily.  beclomethasone (QVAR) 80 mcg/actuation inhaler Take 1 Puff by inhalation two (2) times daily as needed.  albuterol (PROVENTIL HFA, VENTOLIN HFA, PROAIR HFA) 90 mcg/actuation inhaler Take 1 Puff by inhalation every six (6) hours as needed for Wheezing.  1 Inhaler 0     Allergies   Allergen Reactions    Pravachol [Pravastatin] Myalgia     40 mg    Zocor [Simvastatin] Myalgia     Tolerated 10 mg fine, but aching on 20 mg       Family History   Problem Relation Age of Onset    Breast Cancer Mother         dx in her 52's,  age 58    Cancer Mother         uterine cancer in her 52's, treated    Hypertension Father     Stroke Father          age 62    Seizures Father     Stroke Maternal Grandmother          in her [de-identified]    Cancer Maternal Grandfather         ?stomach cancer    Heart Disease Maternal Grandfather          in his 66's    Seizures Paternal Aunt     Alcohol abuse Brother          age 36 from complications    Breast Cancer Other         maternal 1st cousin    Breast Cancer Paternal Aunt         in her 66's     Social History     Tobacco Use    Smoking status: Never Smoker    Smokeless tobacco: Never Used   Substance Use Topics    Alcohol use: Yes     Comment: occ wine       Depression Risk Factor Screening:     3 most recent PHQ Screens 2020   Little interest or pleasure in doing things Not at all   Feeling down, depressed, irritable, or hopeless Not at all   Total Score PHQ 2 0       Alcohol Risk Screen   Do you average more than 1 drink per night or more than 7 drinks a week:  No    On any one occasion in the past three months have you have had more than 3 drinks containing alcohol:  No        Functional Ability and Level of Safety:   Hearing: Hearing is good. Activities of Daily Living: The home contains: no safety equipment.   Patient does total self care  ADL Assessment 2020   Feeding yourself No Help Needed   Getting from bed to chair No Help Needed   Getting dressed No Help Needed   Bathing or showering No Help Needed   Walk across the room (includes cane/walker) No Help Needed   Using the telphone No Help Needed   Taking your medications No Help Needed   Preparing meals No Help Needed   Managing money (expenses/bills) No Help Needed   Moderately strenuous housework (laundry) No Help Needed   Shopping for personal items (toiletries/medicines) No Help Needed   Shopping for groceries No Help Needed   Driving No Help Needed   Climbing a flight of stairs No Help Needed   Getting to places beyond walking distances No Help Needed        Ambulation: with no difficulty     Fall Risk:  Fall Risk Assessment, last 12 mths 9/14/2020   Able to walk? Yes   Fall in past 12 months? No   Fall with injury? -   Number of falls in past 12 months -   Fall Risk Score -     Abuse Screen:  Patient is not abused       Cognitive Screening   Has your family/caregiver stated any concerns about your memory: no         Patient Care Team   Patient Care Team:  Azul Salvador MD as PCP - General  Azul Salvador MD as PCP - [facility] Empaneled Provider    Assessment/Plan   Education and counseling provided:  Are appropriate based on today's review and evaluation  Screening Mammography  Colorectal cancer screening tests  Screening for glaucoma    Diagnoses and all orders for this visit:    1. Medicare annual wellness visit, subsequent    2.  Encounter for immunization  -     ADMIN INFLUENZA VIRUS VAC  -     INFLUENZA VACCINE INACTIVATED (IIV), SUBUNIT, ADJUVANTED, IM        Health Maintenance Due   Topic Date Due    DTaP/Tdap/Td series (1 - Tdap) 02/15/1972    Shingrix Vaccine Age 50> (1 of 2) 02/15/2001    Breast Cancer Screen Mammogram  07/23/2019    Colonoscopy  06/08/2020    Flu Vaccine (1) 09/01/2020    Lipid Screen  08/29/2020

## 2020-09-14 NOTE — PROGRESS NOTES
Chief Complaint   Patient presents with    Annual Wellness Visit    Cholesterol Problem       HISTORY OF PRESENT ILLNESS   HPI  Fasting follow up hypercholesterolemia, labs and medication check. Overall has been getting along well and feeling well in general.  She admits her eating habits have not been very good lately but she did restart exercising regularly again. Working on getting her wt back down, personal goal 165 and she is not far from it. Has been feeling good and has no complaints at this time. Complying w/ Lipitor and tolerating w/o reaction or side effects. REVIEW OF SYMPTOMS   Review of Systems   Constitutional: Negative. Eyes: Negative. States she is up to date on her annual eye exams, glaucoma screen negative   Respiratory: Negative. Uses her Qvar seasonally and seldom if ever really needs the Albuterol   Cardiovascular: Negative. Gastrointestinal: Negative. Genitourinary: Negative. Musculoskeletal: Negative for myalgias. Neurological: Negative. Endo/Heme/Allergies: Negative. Psychiatric/Behavioral: Negative.             PROBLEM LIST/MEDICAL HISTORY     Problem List  Date Reviewed: 9/14/2020          Codes Class Noted    Hypercholesterolemia ICD-10-CM: E78.00  ICD-9-CM: 272.0  8/29/2019        Ocular migraine ICD-10-CM: G43.109  ICD-9-CM: 346.80  8/29/2019    Overview Signed 8/29/2019 11:56 AM by Eleni Diaz MD     Diagnosed by Ophthalmologist  [phone]             ACP (advance care planning) ICD-10-CM: Z71.89  ICD-9-CM: V65.49  8/29/2019    Overview Signed 8/29/2019  1:44 PM by Eleni Diaz MD     ACP discussion [phone], AMD form given             Borderline abnormal thyroid function test ICD-10-CM: R94.6  ICD-9-CM: 794.5  1/7/2014    Overview Signed 1/7/2014  9:54 PM by Eleni Diaz MD     1/2014             Vitamin D deficiency ICD-10-CM: E55.9  ICD-9-CM: 268.9  1/7/2014        Perennial allergic rhinitis ICD-10-CM: J30.89  ICD-9-CM: 477.8  9/14/2011    Overview Addendum 9/14/2011  1:34 PM by Mini Vaca MD     2011 Allergist Dr Smith; dust, trees, mold, fall time             Asthma ICD-10-CM: J45.909  ICD-9-CM: 493.90  9/14/2011    Overview Signed 9/14/2011  9:28 AM by Mini Vaca MD     PFT's allergist office July 2011, Dr Smith             History of iron deficiency anemia ICD-10-CM: Z86.2  ICD-9-CM: V12.3  9/14/2011        Seborrheic keratosis ICD-10-CM: L82.1  ICD-9-CM: 702.19  9/14/2011    Overview Signed 9/14/2011  9:29 AM by Mini Vaca MD     Dermatology, Dr Kaylee Munoz             Depression with anxiety ICD-10-CM: F41.8  ICD-9-CM: 300.4  9/14/2011    Overview Signed 9/14/2011  1:32 PM by Mini Vaca MD     [id number]             History of breast cancer/DCIS, s/p left mastectomy 2004 and 5yr of Tamoxifen ICD-10-CM: Z85.3  ICD-9-CM: V10.3  9/14/2011    Overview Addendum 9/14/2011  1:34 PM by Mini Vaca MD     DCIS; Onc Dr Mckee             S/P GAUDENCIO (total abdominal hysterectomy) w/ LSO for Uterine Fibroids 2004 ICD-10-CM: Z90.710  ICD-9-CM: V88.01  9/14/2011        S/P colonoscopic polypectomy, benign 2005 ICD-10-CM: Z98.890  ICD-9-CM: V45.89  9/14/2011    Overview Signed 9/14/2011  1:35 PM by MD Dr Osmin Ken             Microscopic hematuria, benign ICD-10-CM: R31.29  ICD-9-CM: 599.72  9/14/2011    Overview Addendum 10/17/2014 10:51 AM by Mini Vaca MD     Urology, Dr Unique Whitehead, 2008;  FISH test negative and h/o cystoscopy as well             Osteoarthritis of right hip ICD-10-CM: M16.10  ICD-9-CM: 716.95  9/14/2011    Overview Signed 9/14/2011  1:39 PM by Mini Vaca MD     Xrays 1/2005             IBS (irritable bowel syndrome) (Chronic) ICD-10-CM: K58.9  ICD-9-CM: 564.1  3/18/2010                  PAST SURGICAL HISTORY     Past Surgical History:   Procedure Laterality Date    HAND/FINGER SURGERY UNLISTED dislocated thumb surgery repair    HX  SECTION  ,     HX COLONOSCOPY  2015    Repeat 5 yrs/Luis Cruz    HX COLONOSCOPY  2020    Polyp, Repeat 5 yrs, Dr. Cannon Points  2004    GAUDENCIO-LSO, fibroids    HX MASTECTOMY  2004    left    HX MASTECTOMY  2004    left breast DCIS, total mastectomy and saline implant    HX TUBAL LIGATION      DC COLONOSCOPY W/BIOPSY SINGLE/MULTIPLE      benign polyp; f/u 5 yrs, Dr Daniela Beckman  2005    benign polyp; f/u 5 yrs, Dr Phlylis Joaquin THYROID/PARATHYROID/SOFT TISS      normal         MEDICATIONS     Current Outpatient Medications   Medication Sig    multivitamin (ONE A DAY) tablet Take 1 Tab by mouth daily. Takes ~ 3 x a week    atorvastatin (LIPITOR) 20 mg tablet TAKE 1 TABLET DAILY    aspirin delayed-release 81 mg tablet Take  by mouth daily.  Cetirizine (ZYRTEC) 10 mg cap Take  by mouth daily.  fluticasone (FLONASE) 50 mcg/actuation nasal spray 2 Sprays by Both Nostrils route daily. (Patient taking differently: 2 Sprays by Both Nostrils route daily as needed.)    DOCOSAHEXANOIC ACID/EPA (FISH OIL PO) Take 1 Cap by mouth daily. Takes ~ 3 times a week    Cholecalciferol, Vitamin D3, (VITAMIN D3) 1,000 unit cap Take 1,000 Units by mouth daily.  beclomethasone (QVAR) 80 mcg/actuation inhaler Take 1 Puff by inhalation two (2) times daily as needed.  albuterol (PROVENTIL HFA, VENTOLIN HFA, PROAIR HFA) 90 mcg/actuation inhaler Take 1 Puff by inhalation every six (6) hours as needed for Wheezing. No current facility-administered medications for this visit.            ALLERGIES     Allergies   Allergen Reactions    Pravachol [Pravastatin] Myalgia     40 mg    Zocor [Simvastatin] Myalgia     Tolerated 10 mg fine, but aching on 20 mg          SOCIAL HISTORY     Social History     Socioeconomic History    Marital status:      Spouse name: Not on file    Number of children: Not on file    Years of education: Not on file    Highest education level: Not on file   Occupational History    Occupation: [employer]   Tobacco Use    Smoking status: Never Smoker    Smokeless tobacco: Never Used   Substance and Sexual Activity    Alcohol use: Yes     Comment: occ wine    Drug use: No    Sexual activity: Yes     Partners: Male   Other Topics Concern    Caffeine Concern No     Comment: 1 cup decaff coffee a day, 1 glass of sweet tea in the evenings    Weight Concern Yes     Comment: personal preference 165 lbs    Special Diet No     Comment: nothing special right now, has done Weight Watchers on and off since ~     Exercise Yes     Comment: walks qam x 15 minutes and qpm x 20-25 minutes        IMMUNIZATIONS  Immunization History   Administered Date(s) Administered    Influenza Vaccine 10/01/2013    Influenza Vaccine (Tri) Adjuvanted 2020    Pneumococcal Conjugate (PCV-13) 2017    Pneumococcal Polysaccharide (PPSV-23) 2016         FAMILY HISTORY     Family History   Problem Relation Age of Onset    Breast Cancer Mother         dx in her 52's,  age 58    Cancer Mother         uterine cancer in her 52's, treated    Hypertension Father     Stroke Father          age 62    Seizures Father     Stroke Maternal Grandmother          in her [de-identified]    Cancer Maternal Grandfather         ?stomach cancer    Heart Disease Maternal Grandfather          in his 66's    Seizures Paternal Aunt     Alcohol abuse Brother          age 36 from complications    Breast Cancer Other         maternal 1st cousin    Breast Cancer Paternal Aunt         in her 66's         VITALS     Visit Vitals  /77   Pulse 78   Temp 97.1 °F (36.2 °C) (Oral)   Resp 24   Ht 5' 8\" (1.727 m)   Wt 169 lb 12.8 oz (77 kg)   SpO2 98%   BMI 25.82 kg/m²          PHYSICAL EXAMINATION   Physical Exam  Vitals signs reviewed. Constitutional:       Appearance: Normal appearance. HENT:      Right Ear: Tympanic membrane normal.      Left Ear: Tympanic membrane normal.   Eyes:      General: No scleral icterus. Neck:      Musculoskeletal: Neck supple. Thyroid: No thyroid mass, thyromegaly or thyroid tenderness. Vascular: No carotid bruit. Cardiovascular:      Rate and Rhythm: Normal rate and regular rhythm. Heart sounds: Normal heart sounds. No murmur. Pulmonary:      Effort: Pulmonary effort is normal.      Breath sounds: Normal breath sounds. Abdominal:      Palpations: Abdomen is soft. Tenderness: There is no abdominal tenderness. Musculoskeletal:         General: No swelling or tenderness. Right lower leg: No edema. Left lower leg: No edema. Lymphadenopathy:      Cervical: No cervical adenopathy. Neurological:      Mental Status: She is oriented to person, place, and time. Mental status is at baseline.    Psychiatric:         Mood and Affect: Mood normal.             DIAGNOSTIC DATA         LABORATORY DATA     Results for orders placed or performed in visit on 08/29/19   CBC W/O DIFF   Result Value Ref Range    WBC 5.8 3.4 - 10.8 x10E3/uL    RBC 5.20 3.77 - 5.28 x10E6/uL    HGB 14.3 11.1 - 15.9 g/dL    HCT 42.8 34.0 - 46.6 %    MCV 82 79 - 97 fL    MCH 27.5 26.6 - 33.0 pg    MCHC 33.4 31.5 - 35.7 g/dL    RDW 12.3 12.3 - 15.4 %    PLATELET 203 397 - 108 x10E3/uL   LIPID PANEL   Result Value Ref Range    Cholesterol, total 199 100 - 199 mg/dL    Triglyceride 66 0 - 149 mg/dL    HDL Cholesterol 77 >39 mg/dL    VLDL, calculated 13 5 - 40 mg/dL    LDL, calculated 109 (H) 0 - 99 mg/dL   METABOLIC PANEL, COMPREHENSIVE   Result Value Ref Range    Glucose 86 65 - 99 mg/dL    BUN 10 8 - 27 mg/dL    Creatinine 0.92 0.57 - 1.00 mg/dL    GFR est non-AA 64 >59 mL/min/1.73    GFR est AA 74 >59 mL/min/1.73    BUN/Creatinine ratio 11 (L) 12 - 28    Sodium 144 134 - 144 mmol/L    Potassium 4.4 3.5 - 5.2 mmol/L    Chloride 103 96 - 106 mmol/L    CO2 25 20 - 29 mmol/L    Calcium 9.9 8.7 - 10.3 mg/dL    Protein, total 7.4 6.0 - 8.5 g/dL    Albumin 5.0 (H) 3.6 - 4.8 g/dL    GLOBULIN, TOTAL 2.4 1.5 - 4.5 g/dL    A-G Ratio 2.1 1.2 - 2.2    Bilirubin, total 0.4 0.0 - 1.2 mg/dL    Alk. phosphatase 56 39 - 117 IU/L    AST (SGOT) 18 0 - 40 IU/L    ALT (SGPT) 11 0 - 32 IU/L   TSH 3RD GENERATION   Result Value Ref Range    TSH 2.330 0.450 - 4.500 uIU/mL   CVD REPORT   Result Value Ref Range    INTERPRETATION Note             ASSESSMENT & PLAN       ICD-10-CM ICD-9-CM    1. Medicare annual wellness visit, subsequent  Z00.00 V70.0 See separate note under this same encounter visit for Medicare Wellness note. 2. Encounter for immunization  Z23 V03.89 ADMIN INFLUENZA VIRUS VAC      INFLUENZA VACCINE INACTIVATED (IIV), SUBUNIT, ADJUVANTED, IM   3. Hypercholesterolemia  E78.00 272.0 LIPID PANEL      TSH 3RD GENERATION      METABOLIC PANEL, COMPREHENSIVE   4. Vitamin D deficiency  E55.9 268.9 VITAMIN D, 25 HYDROXY   5. History of iron deficiency anemia  Z86.2 V12.3 CBC W/O DIFF   6. Microscopic hematuria, benign  R31.29 599.72 URINALYSIS W/ REFLEX CULTURE     Order given for fasting labs at [facility]  Cardiovascular risk and specific lipid/LDL goals reviewed  Reviewed diet, nutrition, exercise, weight management, BMI/goals, age/risk based screening recommendations, health maintenance & prevention counseling. Cancer screening USPTFS guidelines reviewed w/ pt today. Discussed benefits/positive/negative outcomes of screening based on age/risk stratification. Informed consent for/against screening based on pt's personal hx/risk factors. Updated in history above and health maintenance. Colonoscopy per Dr. Mariann Santos 9/1/2020, report pending. Gets her mammogram screens done annually [facility] per her gyn  Reviewed medications and side effects, doing well on current regimen  Further follow up & other recommendations pending review of labs.  If all remains good and stable, follow up in 1 yr, sooner prn

## 2020-09-30 LAB
25(OH)D3+25(OH)D2 SERPL-MCNC: 42.5 NG/ML (ref 30–100)
ALBUMIN SERPL-MCNC: 4.9 G/DL (ref 3.8–4.8)
ALBUMIN/GLOB SERPL: 2 {RATIO} (ref 1.2–2.2)
ALP SERPL-CCNC: 63 IU/L (ref 39–117)
ALT SERPL-CCNC: 10 IU/L (ref 0–32)
APPEARANCE UR: CLEAR
AST SERPL-CCNC: 18 IU/L (ref 0–40)
BACTERIA #/AREA URNS HPF: NORMAL /[HPF]
BILIRUB SERPL-MCNC: 0.4 MG/DL (ref 0–1.2)
BILIRUB UR QL STRIP: NEGATIVE
BUN SERPL-MCNC: 20 MG/DL (ref 8–27)
BUN/CREAT SERPL: 22 (ref 12–28)
CALCIUM SERPL-MCNC: 9.8 MG/DL (ref 8.7–10.3)
CASTS URNS QL MICRO: NORMAL /LPF
CHLORIDE SERPL-SCNC: 99 MMOL/L (ref 96–106)
CHOLEST SERPL-MCNC: 205 MG/DL (ref 100–199)
CO2 SERPL-SCNC: 22 MMOL/L (ref 20–29)
COLOR UR: YELLOW
CREAT SERPL-MCNC: 0.92 MG/DL (ref 0.57–1)
EPI CELLS #/AREA URNS HPF: NORMAL /HPF (ref 0–10)
ERYTHROCYTE [DISTWIDTH] IN BLOOD BY AUTOMATED COUNT: 11.7 % (ref 11.7–15.4)
GLOBULIN SER CALC-MCNC: 2.5 G/DL (ref 1.5–4.5)
GLUCOSE SERPL-MCNC: 89 MG/DL (ref 65–99)
GLUCOSE UR QL: NEGATIVE
HCT VFR BLD AUTO: 41.3 % (ref 34–46.6)
HDLC SERPL-MCNC: 75 MG/DL
HGB BLD-MCNC: 13.1 G/DL (ref 11.1–15.9)
HGB UR QL STRIP: NEGATIVE
INTERPRETATION, 910389: NORMAL
KETONES UR QL STRIP: NEGATIVE
LDLC SERPL CALC-MCNC: 121 MG/DL (ref 0–99)
LEUKOCYTE ESTERASE UR QL STRIP: NEGATIVE
MCH RBC QN AUTO: 26.5 PG (ref 26.6–33)
MCHC RBC AUTO-ENTMCNC: 31.7 G/DL (ref 31.5–35.7)
MCV RBC AUTO: 83 FL (ref 79–97)
MICRO URNS: NORMAL
MICRO URNS: NORMAL
NITRITE UR QL STRIP: NEGATIVE
PH UR STRIP: 7 [PH] (ref 5–7.5)
PLATELET # BLD AUTO: 249 X10E3/UL (ref 150–450)
POTASSIUM SERPL-SCNC: 5 MMOL/L (ref 3.5–5.2)
PROT SERPL-MCNC: 7.4 G/DL (ref 6–8.5)
PROT UR QL STRIP: NEGATIVE
RBC # BLD AUTO: 4.95 X10E6/UL (ref 3.77–5.28)
RBC #/AREA URNS HPF: NORMAL /HPF (ref 0–2)
SODIUM SERPL-SCNC: 138 MMOL/L (ref 134–144)
SP GR UR: 1 (ref 1–1.03)
TRIGL SERPL-MCNC: 51 MG/DL (ref 0–149)
TSH SERPL DL<=0.005 MIU/L-ACNC: 2 UIU/ML (ref 0.45–4.5)
URINALYSIS REFLEX, 377202: NORMAL
UROBILINOGEN UR STRIP-MCNC: 0.2 MG/DL (ref 0.2–1)
VLDLC SERPL CALC-MCNC: 9 MG/DL (ref 5–40)
WBC # BLD AUTO: 5.6 X10E3/UL (ref 3.4–10.8)
WBC #/AREA URNS HPF: NORMAL /HPF (ref 0–5)

## 2020-10-08 ENCOUNTER — TELEPHONE (OUTPATIENT)
Dept: FAMILY MEDICINE CLINIC | Age: 70
End: 2020-10-08

## 2020-10-08 DIAGNOSIS — E78.00 HYPERCHOLESTEROLEMIA: Primary | ICD-10-CM

## 2020-10-08 DIAGNOSIS — E78.5 HYPERLIPIDEMIA, UNSPECIFIED HYPERLIPIDEMIA TYPE: Chronic | ICD-10-CM

## 2020-10-09 NOTE — TELEPHONE ENCOUNTER
Spoke with pt and gave Dr. Zaria Lubin recommendations from telephone encounter on 9/21/20. Pt had labs drawn on 9/29/20 and was requesting a refill of the atorvastatin to be sent in to the pharmacy for the year.

## 2020-10-10 RX ORDER — ATORVASTATIN CALCIUM 20 MG/1
TABLET, FILM COATED ORAL
Qty: 90 TAB | Refills: 3 | Status: SHIPPED | OUTPATIENT
Start: 2020-10-10 | End: 2020-10-23 | Stop reason: SDUPTHER

## 2020-10-23 DIAGNOSIS — E78.00 HYPERCHOLESTEROLEMIA: ICD-10-CM

## 2020-10-23 NOTE — TELEPHONE ENCOUNTER
Patient is requesting a new prescription to be sent to [facility] for Lipitor 20 mg. Previous prescription was sent to [facility]. Please review and prescribe if appropriate. Thank you. Last visit 09/14/2020 MD Verna Unger   Next appointment 1 year (09/2021)   Previous refill encounter(s)   10/10/2020 Lipitor #90 with 3 refills.      Requested Prescriptions     Pending Prescriptions Disp Refills    atorvastatin (LIPITOR) 20 mg tablet 90 Tab 3     Sig: TAKE 1 TABLET DAILY

## 2020-10-23 NOTE — TELEPHONE ENCOUNTER
----- Message from Romelia Leavitt sent at 10/23/2020  9:52 AM EDT -----  Regarding: FW: /Refill    ----- Message -----  From: Orion Ricardo  Sent: 10/23/2020   8:56 AM EDT  To: [facility] Front Office Pool  Subject: /Refill                         Caller (if not patient):self  Relationship of caller (if not patient):self  Best contact number(s):[phone]  Name of medication and dosage if known: cholesterol medicine -Pt. did not know specific name  Is patient out of this medication (yes/no): Yes  Pharmacy name:   [facility]   Pharmacy listed in chart? (yes/no): yes  Pharmacy phone number:  Date of last visit:9/15/2020  Details to clarify the request: Pt. requesting a call when complete.

## 2020-10-24 RX ORDER — ATORVASTATIN CALCIUM 20 MG/1
TABLET, FILM COATED ORAL
Qty: 90 TAB | Refills: 3 | Status: SHIPPED | OUTPATIENT
Start: 2020-10-24 | End: 2021-10-19 | Stop reason: SDUPTHER

## 2021-01-21 ENCOUNTER — OFFICE VISIT (OUTPATIENT)
Dept: FAMILY MEDICINE CLINIC | Age: 71
End: 2021-01-21
Payer: MEDICARE

## 2021-01-21 VITALS
RESPIRATION RATE: 18 BRPM | WEIGHT: 171.4 LBS | DIASTOLIC BLOOD PRESSURE: 70 MMHG | SYSTOLIC BLOOD PRESSURE: 112 MMHG | OXYGEN SATURATION: 97 % | HEIGHT: 68 IN | HEART RATE: 83 BPM | BODY MASS INDEX: 25.98 KG/M2 | TEMPERATURE: 98 F

## 2021-01-21 DIAGNOSIS — H61.23 BILATERAL IMPACTED CERUMEN: Primary | ICD-10-CM

## 2021-01-21 PROCEDURE — 99212 OFFICE O/P EST SF 10 MIN: CPT | Performed by: FAMILY MEDICINE

## 2021-01-21 PROCEDURE — G8427 DOCREV CUR MEDS BY ELIG CLIN: HCPCS | Performed by: FAMILY MEDICINE

## 2021-01-21 PROCEDURE — G8536 NO DOC ELDER MAL SCRN: HCPCS | Performed by: FAMILY MEDICINE

## 2021-01-21 PROCEDURE — G8399 PT W/DXA RESULTS DOCUMENT: HCPCS | Performed by: FAMILY MEDICINE

## 2021-01-21 PROCEDURE — 3017F COLORECTAL CA SCREEN DOC REV: CPT | Performed by: FAMILY MEDICINE

## 2021-01-21 PROCEDURE — G8419 CALC BMI OUT NRM PARAM NOF/U: HCPCS | Performed by: FAMILY MEDICINE

## 2021-01-21 PROCEDURE — 1101F PT FALLS ASSESS-DOCD LE1/YR: CPT | Performed by: FAMILY MEDICINE

## 2021-01-21 PROCEDURE — G9717 DOC PT DX DEP/BP F/U NT REQ: HCPCS | Performed by: FAMILY MEDICINE

## 2021-01-21 PROCEDURE — 1090F PRES/ABSN URINE INCON ASSESS: CPT | Performed by: FAMILY MEDICINE

## 2021-01-21 PROCEDURE — 69210 REMOVE IMPACTED EAR WAX UNI: CPT | Performed by: FAMILY MEDICINE

## 2021-01-21 NOTE — PROGRESS NOTES
Chief Complaint   Patient presents with    Wax in Ear     Left ear > right       HISTORY OF PRESENT ILLNESS   HPI  Patient prone toward wax build up. Ears started feeling clogged up and stuffy yesterday morning, L>R. She dug some wax out of the left ear using her finger nail and made it worse but did see some wax on her fingernails. She then used ear wax drops and mineral oil. The right ear was fine but the left ear felt worse to the point that she could not hear out of it at all. She used more drops and took a hot shower. Some drained out. It feels better today but not completely back to normal. Still feels alittle stopped up on the left side. But she can hear alittle better however. Seems like she hears a \"swishing/air\" going past her ear now. Denies ear pain, drainage, fever, chills, tinnitus, dizziness or nausea. She has had some mild nasal congestion and stuffiness. Otherwise feeling ok. REVIEW OF SYMPTOMS   Review of Systems   Constitutional: Negative. HENT: Negative for ear pain, sore throat and tinnitus. Respiratory: Negative. Neurological: Negative.             PROBLEM LIST/MEDICAL HISTORY     Problem List  Date Reviewed: 1/21/2021          Codes Class Noted    Hypercholesterolemia ICD-10-CM: E78.00  ICD-9-CM: 272.0  8/29/2019        Ocular migraine ICD-10-CM: G43.109  ICD-9-CM: 346.80  8/29/2019    Overview Signed 8/29/2019 11:56 AM by Frankie Nassar MD     Diagnosed by Ophthalmologist  [phone]             ACP (advance care planning) ICD-10-CM: Z71.89  ICD-9-CM: V65.49  8/29/2019    Overview Signed 8/29/2019  1:44 PM by Frankie Nassar MD     ACP discussion [phone], AMD form given             Borderline abnormal thyroid function test ICD-10-CM: R94.6  ICD-9-CM: 794.5  1/7/2014    Overview Signed 1/7/2014  9:54 PM by Frankie Nassar MD     1/2014             Vitamin D deficiency ICD-10-CM: E55.9  ICD-9-CM: 268.9  1/7/2014        Perennial allergic rhinitis ICD-10-CM: J30.89  ICD-9-CM: 477.8  9/14/2011    Overview Addendum 9/14/2011  1:34 PM by Denver Kaur MD     2011 Allergist Dr Smith; dust, trees, mold, fall time             Asthma ICD-10-CM: J45.909  ICD-9-CM: 493.90  9/14/2011    Overview Signed 9/14/2011  9:28 AM by Denver Kaur MD     PFT's allergist office July 2011, Dr Smith             History of iron deficiency anemia ICD-10-CM: Z86.2  ICD-9-CM: V12.3  9/14/2011        Seborrheic keratosis ICD-10-CM: L82.1  ICD-9-CM: 702.19  9/14/2011    Overview Signed 9/14/2011  9:29 AM by Denver Kaur MD     Dermatology, Dr Mani Roberson             Depression with anxiety ICD-10-CM: F41.8  ICD-9-CM: 300.4  9/14/2011    Overview Signed 9/14/2011  1:32 PM by Denver Kaur MD     [phone]             History of breast cancer/DCIS, s/p left mastectomy 2004 and 5yr of Tamoxifen ICD-10-CM: Z85.3  ICD-9-CM: V10.3  9/14/2011    Overview Addendum 9/14/2011  1:34 PM by Denver Kaur MD     DCIS; Onc Dr Lisa Smith             S/P GAUDENCIO (total abdominal hysterectomy) w/ LSO for Uterine Fibroids 2004 ICD-10-CM: Z90.710  ICD-9-CM: V88.01  9/14/2011        S/P colonoscopic polypectomy, benign 2005 ICD-10-CM: Z98.890  ICD-9-CM: V45.89  9/14/2011    Overview Signed 9/14/2011  1:35 PM by MD Dr Chinedu Bailey             Microscopic hematuria, benign ICD-10-CM: R31.29  ICD-9-CM: 599.72  9/14/2011    Overview Addendum 10/17/2014 10:51 AM by Denver Kaur MD     Urology, Dr Bethany Vinson, 2008;  FISH test negative and h/o cystoscopy as well             Osteoarthritis of right hip ICD-10-CM: M16.10  ICD-9-CM: 716.95  9/14/2011    Overview Signed 9/14/2011  1:39 PM by Denver Kaur MD     Xrays 1/2005             IBS (irritable bowel syndrome) (Chronic) ICD-10-CM: K58.9  ICD-9-CM: 564.1  3/18/2010                  PAST SURGICAL HISTORY     Past Surgical History:   Procedure Laterality Date    HAND/FINGER SURGERY UNLISTED dislocated thumb surgery repair    HX  SECTION  ,     HX COLONOSCOPY  2015    Repeat 5 yrs/Luis Cruz    HX COLONOSCOPY  2020    Polyp, Repeat 5 yrs, Dr. Michel Oconnor  2004    GAUDENCIO-LSO, fibroids    HX MASTECTOMY      left    HX MASTECTOMY  2004    left breast DCIS, total mastectomy and saline implant    HX TUBAL LIGATION      NC COLONOSCOPY W/BIOPSY SINGLE/MULTIPLE      benign polyp; f/u 5 yrs, Dr Gordon Medina  2005    benign polyp; f/u 5 yrs, Dr Heike Blackmon THYROID/PARATHYROID/SOFT TISS      normal         MEDICATIONS     Current Outpatient Medications   Medication Sig    atorvastatin (LIPITOR) 20 mg tablet TAKE 1 TABLET DAILY    multivitamin (ONE A DAY) tablet Take 1 Tab by mouth daily. Takes ~ 3 x a week    aspirin delayed-release 81 mg tablet Take  by mouth daily.  Cetirizine (ZYRTEC) 10 mg cap Take  by mouth daily.  fluticasone (FLONASE) 50 mcg/actuation nasal spray 2 Sprays by Both Nostrils route daily. (Patient taking differently: 2 Sprays by Both Nostrils route daily as needed.)    DOCOSAHEXANOIC ACID/EPA (FISH OIL PO) Take 1 Cap by mouth daily. Takes ~ 3 times a week    Cholecalciferol, Vitamin D3, (VITAMIN D3) 1,000 unit cap Take 1,000 Units by mouth daily.  beclomethasone (QVAR) 80 mcg/actuation inhaler Take 1 Puff by inhalation two (2) times daily as needed.  albuterol (PROVENTIL HFA, VENTOLIN HFA, PROAIR HFA) 90 mcg/actuation inhaler Take 1 Puff by inhalation every six (6) hours as needed for Wheezing. No current facility-administered medications for this visit.            ALLERGIES     Allergies   Allergen Reactions    Pravachol [Pravastatin] Myalgia     40 mg    Zocor [Simvastatin] Myalgia     Tolerated 10 mg fine, but aching on 20 mg          SOCIAL HISTORY     Social History     Tobacco Use    Smoking status: Never Smoker    Smokeless tobacco: Never Used   Substance Use Topics    Alcohol use: Yes     Comment: [facility]     Social History     Social History Narrative    Not on file     Social History     Substance and Sexual Activity   Sexual Activity Yes    Partners: Male       IMMUNIZATIONS     Immunization History   Administered Date(s) Administered    Influenza Vaccine 10/01/2013    Influenza Vaccine (Tri) Adjuvanted (>65 Yrs FLUAD TRI 69075) 2020    Pneumococcal Conjugate (PCV-13) 2017    Pneumococcal Polysaccharide (PPSV-23) 2016         FAMILY HISTORY     Family History   Problem Relation Age of Onset    Breast Cancer Mother         dx in her 52's,  age 58    Cancer Mother         uterine cancer in her 52's, treated    Hypertension Father     Stroke Father          age 62    Seizures Father     Stroke Maternal Grandmother          in her [de-identified]    Cancer Maternal Grandfather         ?stomach cancer    Heart Disease Maternal Grandfather          in his 66's    Seizures Paternal Aunt     Alcohol abuse Brother          age 36 from complications    Breast Cancer Other         maternal 1st cousin    Breast Cancer Paternal Aunt         in her 66's         VITALS     Visit Vitals  /70 (BP 1 Location: Left arm, BP Patient Position: Sitting)   Pulse 83   Temp 98 °F (36.7 °C) (Temporal)   Resp 18   Ht 5' 8\" (1.727 m)   Wt 171 lb 6.4 oz (77.7 kg)   SpO2 97%   BMI 26.06 kg/m²          PHYSICAL EXAMINATION   Physical Exam  Vitals signs reviewed. Constitutional:       General: She is not in acute distress. Appearance: Normal appearance. She is not ill-appearing. HENT:      Right Ear: There is impacted cerumen (very mild). Left Ear: There is impacted cerumen (mild). Cardiovascular:      Rate and Rhythm: Normal rate. Pulmonary:      Effort: Pulmonary effort is normal. No respiratory distress. Neurological:      Mental Status: She is alert. ASSESSMENT & PLAN       ICD-10-CM ICD-9-CM    1. Bilateral impacted cerumen  H61.23 380.4 REMOVE IMPACTED EAR WAX     B ear lavage performed in office today. Patient tolerated well. Home care instructions reviewed w/ pt today. Follow up prn.

## 2021-01-21 NOTE — PROGRESS NOTES
Chief Complaint   Patient presents with    Wax in Ear     Left ear     1. Have you been to the ER, urgent care clinic since your last visit? Hospitalized since your last visit? No    2. Have you seen or consulted any other health care providers outside of the [address] [place] [address] since your last visit? Include any pap smears or colon screening.  No  hot

## 2021-01-29 ENCOUNTER — TELEPHONE (OUTPATIENT)
Dept: FAMILY MEDICINE CLINIC | Age: 71
End: 2021-01-29

## 2021-01-29 NOTE — TELEPHONE ENCOUNTER
Pt was seen on the 21st by provider, and called requesting a referral to an ENT doctor.          [facility]# [phone]

## 2021-02-02 NOTE — TELEPHONE ENCOUNTER
Please give pt the information for [facility]. She can call to schedule her own appt and call us back if an actual referral is needed per insurance. We would suggest need doctor's name and date/time of appt.

## 2021-07-15 ENCOUNTER — TELEPHONE (OUTPATIENT)
Dept: FAMILY MEDICINE CLINIC | Age: 71
End: 2021-07-15

## 2021-07-15 ENCOUNTER — OFFICE VISIT (OUTPATIENT)
Dept: FAMILY MEDICINE CLINIC | Age: 71
End: 2021-07-15

## 2021-07-15 VITALS
RESPIRATION RATE: 16 BRPM | HEART RATE: 91 BPM | OXYGEN SATURATION: 97 % | SYSTOLIC BLOOD PRESSURE: 132 MMHG | BODY MASS INDEX: 25.01 KG/M2 | HEIGHT: 68 IN | DIASTOLIC BLOOD PRESSURE: 70 MMHG | WEIGHT: 165 LBS | TEMPERATURE: 98.6 F

## 2021-07-15 DIAGNOSIS — M25.611 DECREASED RANGE OF MOTION OF RIGHT SHOULDER: ICD-10-CM

## 2021-07-15 DIAGNOSIS — G89.29 CHRONIC RIGHT SHOULDER PAIN: Primary | ICD-10-CM

## 2021-07-15 DIAGNOSIS — M25.511 CHRONIC RIGHT SHOULDER PAIN: Primary | ICD-10-CM

## 2021-07-15 PROCEDURE — G8427 DOCREV CUR MEDS BY ELIG CLIN: HCPCS | Performed by: FAMILY MEDICINE

## 2021-07-15 PROCEDURE — 1090F PRES/ABSN URINE INCON ASSESS: CPT | Performed by: FAMILY MEDICINE

## 2021-07-15 PROCEDURE — 3017F COLORECTAL CA SCREEN DOC REV: CPT | Performed by: FAMILY MEDICINE

## 2021-07-15 PROCEDURE — G9717 DOC PT DX DEP/BP F/U NT REQ: HCPCS | Performed by: FAMILY MEDICINE

## 2021-07-15 PROCEDURE — 99213 OFFICE O/P EST LOW 20 MIN: CPT | Performed by: FAMILY MEDICINE

## 2021-07-15 PROCEDURE — G8536 NO DOC ELDER MAL SCRN: HCPCS | Performed by: FAMILY MEDICINE

## 2021-07-15 PROCEDURE — G8399 PT W/DXA RESULTS DOCUMENT: HCPCS | Performed by: FAMILY MEDICINE

## 2021-07-15 PROCEDURE — 1101F PT FALLS ASSESS-DOCD LE1/YR: CPT | Performed by: FAMILY MEDICINE

## 2021-07-15 PROCEDURE — G8419 CALC BMI OUT NRM PARAM NOF/U: HCPCS | Performed by: FAMILY MEDICINE

## 2021-07-15 NOTE — PROGRESS NOTES
Chief Complaint   Patient presents with    Shoulder Pain     right shoulder pain- in fall used blower on shoulder- ROM worse   1. Have you been to the ER, urgent care clinic since your last visit? Hospitalized since your last visit?no    2. Have you seen or consulted any other health care providers outside of the [address] [place] [address] since your last visit? Include any pap smears or colon screening.  Dr Cleaster Bumpers ear wax removed

## 2021-07-15 NOTE — PROGRESS NOTES
Chief Complaint   Patient presents with    Shoulder Pain     right shoulder pain- in falltime used blower on shoulder- ROM worse       HISTORY OF PRESENT ILLNESS   HPI  Right handed retired  presents w/ 6 week h/o right shoulder pain and some decreased ROM. She feels she first aggravated back in the fall when she was doing yardwork w/ her  and used a heavy leaf blower. Right after using it she felt some soreness and discomfort in her right shoulder later that evening. It continued to bother her a bit on and off for a while after that but it gradually seemed to subside w/ supportive home care. But now the past 6 weeks or so she is finding that it is more and more difficult for her to rotate her right shoulder in certain directions. Reaching behind her or especially internally rotating to fasten her bra is limited. She cannot use her right arm to fasten her bra any longer. She has some aching and stiffness in the right shoulder. Some positions bother her when trying to lie in bed and sleep at night. Doesn't bother her much just sitting at rest unless she has overused it that day. Typically rated a 2-8/49 but w/ certain movements up to a 5/10. Nothing significant enough to warrant her taking anything OTC. She does not use heat or ice. She denies neck pain. She denies extremity weakness or paresthesias. She recalls having a frozen shoulder about 20 yrs ago but cannot recall which side, but PT worked really well for it in the past.  Current symptoms are not as severe as back then but she feels PT does well for her. REVIEW OF SYMPTOMS   Review of Systems   Constitutional: Negative. Respiratory: Negative. Cardiovascular: Negative. Musculoskeletal: Negative for back pain and neck pain. Neurological: Negative for tingling, sensory change and focal weakness.            PROBLEM LIST/MEDICAL HISTORY     Problem List  Date Reviewed: 7/15/2021        Codes Class Noted Hypercholesterolemia ICD-10-CM: E78.00  ICD-9-CM: 272.0  8/29/2019        Ocular migraine ICD-10-CM: G43.109  ICD-9-CM: 346.80  8/29/2019    Overview Signed 8/29/2019 11:56 AM by Hollis Fontaine MD     Diagnosed by Ophthalmologist  [phone]             ACP (advance care planning) ICD-10-CM: Z71.89  ICD-9-CM: V65.49  8/29/2019    Overview Signed 8/29/2019  1:44 PM by Hollis Fontaine MD     ACP discussion [phone], AMD form given             Borderline abnormal thyroid function test ICD-10-CM: R94.6  ICD-9-CM: 794.5  1/7/2014    Overview Signed 1/7/2014  9:54 PM by Hollis Fontaine MD     1/2014             Vitamin D deficiency ICD-10-CM: E55.9  ICD-9-CM: 268.9  1/7/2014        Perennial allergic rhinitis ICD-10-CM: J30.89  ICD-9-CM: 477.8  9/14/2011    Overview Addendum 9/14/2011  1:34 PM by Hollis Fontaine MD     2011 Allergist Dr Smith; dust, trees, mold, fall time             Asthma ICD-10-CM: J45.909  ICD-9-CM: 493.90  9/14/2011    Overview Signed 9/14/2011  9:28 AM by Hollis Fontaine MD     PFT's allergist office July 2011, Dr Smith             History of iron deficiency anemia ICD-10-CM: Z86.2  ICD-9-CM: V12.3  9/14/2011        Seborrheic keratosis ICD-10-CM: L82.1  ICD-9-CM: 702.19  9/14/2011    Overview Signed 9/14/2011  9:29 AM by Hollis Fontaine MD     Dermatology, Dr Tunde Armstrong             Depression with anxiety ICD-10-CM: F41.8  ICD-9-CM: 300.4  9/14/2011    Overview Signed 9/14/2011  1:32 PM by Hollis Fontaine MD     [id number]             History of breast cancer/DCIS, s/p left mastectomy 2004 and 5yr of Tamoxifen ICD-10-CM: Z85.3  ICD-9-CM: V10.3  9/14/2011    Overview Addendum 9/14/2011  1:34 PM by Hollis Fontaine MD     DCIS;  Onc Dr Kayley Loving             S/P GAUDENCIO (total abdominal hysterectomy) w/ LSO for Uterine Fibroids 2004 ICD-10-CM: Z90.710  ICD-9-CM: V88.01  9/14/2011        S/P colonoscopic polypectomy, benign 2005 ICD-10-CM: Z87.650  ICD-9-CM: V45.89  2011    Overview Signed 2011  1:35 PM by MD Dr Shandra Pozo             Microscopic hematuria, benign ICD-10-CM: R31.29  ICD-9-CM: 599.72  2011    Overview Addendum 10/17/2014 10:51 AM by Hollis Fontaine MD     Urology, Dr Ryan Mojica, ; FISH test negative and h/o cystoscopy as well             Osteoarthritis of right hip ICD-10-CM: M16.10  ICD-9-CM: 716.95  2011    Overview Signed 2011  1:39 PM by Hollis Fontaine MD     Xrays 2005             IBS (irritable bowel syndrome) (Chronic) ICD-10-CM: K58.9  ICD-9-CM: 564.1  3/18/2010                  PAST SURGICAL HISTORY     Past Surgical History:   Procedure Laterality Date    HAND/FINGER SURGERY UNLISTED      dislocated thumb surgery repair    HX  SECTION  ,     HX COLONOSCOPY  2015    Repeat 5 yrs/Luis Cruz    HX COLONOSCOPY  2020    Polyp, Repeat 5 yrs, Dr. Durwin Paget  2004    GAUDENCIO-LSO, fibroids    HX MASTECTOMY  2004    left    HX MASTECTOMY  2004    left breast DCIS, total mastectomy and saline implant    HX TUBAL LIGATION      NM COLONOSCOPY W/BIOPSY SINGLE/MULTIPLE      benign polyp; f/u 5 yrs, Dr Israel Pinto  2005    benign polyp; f/u 5 yrs, Dr Kandice Reyes THYROID/PARATHYROID/SOFT TISS      normal         MEDICATIONS     Current Outpatient Medications   Medication Sig    atorvastatin (LIPITOR) 20 mg tablet TAKE 1 TABLET DAILY    multivitamin (ONE A DAY) tablet Take 1 Tab by mouth daily. Takes ~ 3 x a week    aspirin delayed-release 81 mg tablet Take  by mouth daily.  albuterol (PROVENTIL HFA, VENTOLIN HFA, PROAIR HFA) 90 mcg/actuation inhaler Take 1 Puff by inhalation every six (6) hours as needed for Wheezing.  Cetirizine (ZYRTEC) 10 mg cap Take  by mouth daily.  fluticasone (FLONASE) 50 mcg/actuation nasal spray 2 Sprays by Both Nostrils route daily. (Patient taking differently: 2 Sprays by Both Nostrils route daily as needed.)    DOCOSAHEXANOIC ACID/EPA (FISH OIL PO) Take 1 Cap by mouth daily. Takes ~ 3 times a week    Cholecalciferol, Vitamin D3, (VITAMIN D3) 1,000 unit cap Take 1,000 Units by mouth daily.  beclomethasone (QVAR) 80 mcg/actuation inhaler Take 1 Puff by inhalation two (2) times daily as needed. No current facility-administered medications for this visit.           ALLERGIES     Allergies   Allergen Reactions    Pravachol [Pravastatin] Myalgia     40 mg    Zocor [Simvastatin] Myalgia     Tolerated 10 mg fine, but aching on 20 mg          SOCIAL HISTORY     Social History     Tobacco Use    Smoking status: Never Smoker    Smokeless tobacco: Never Used   Substance Use Topics    Alcohol use: Yes     Comment: occ wine     Social History     Social History Narrative    Not on file     Social History     Substance and Sexual Activity   Sexual Activity Yes    Partners: Male       IMMUNIZATIONS     Immunization History   Administered Date(s) Administered    COVID-19, PFIZER, MRNA, LNP-S, PF, 30MCG/0.3ML DOSE 2021, 2021    Influenza Vaccine 10/01/2013    Influenza Vaccine (Tri) Adjuvanted (>65 Yrs FLUAD TRI 05943) 2020    Pneumococcal Conjugate (PCV-13) 2017    Pneumococcal Polysaccharide (PPSV-23) 2016         FAMILY HISTORY     Family History   Problem Relation Age of Onset    Breast Cancer Mother         dx in her 52's,  age 58    Cancer Mother         uterine cancer in her 52's, treated    Hypertension Father     Stroke Father          age 62    Seizures Father     Stroke Maternal Grandmother          in her [de-identified]    Cancer Maternal Grandfather         ?stomach cancer    Heart Disease Maternal Grandfather          in his 66's    Seizures Paternal [de-identified]     Alcohol abuse Brother          age 36 from complications    Breast Cancer Other         maternal 1st cousin    Breast Cancer Paternal Aunt         in her 66's         VITALS     Visit Vitals  /70 (BP 1 Location: Left arm, BP Patient Position: Sitting, BP Cuff Size: Adult)   Pulse 91   Temp 98.6 °F (37 °C)   Resp 16   Ht 5' 8\" (1.727 m)   Wt 165 lb (74.8 kg)   SpO2 97%   BMI 25.09 kg/m²          PHYSICAL EXAMINATION   Physical Exam  Vitals reviewed. Constitutional:       General: She is not in acute distress. Musculoskeletal:      Right shoulder: No swelling, tenderness or bony tenderness. Decreased range of motion (limited internal rotation). Normal strength. Left shoulder: Normal.      Cervical back: Full passive range of motion without pain and normal range of motion. No pain with movement, spinous process tenderness or muscular tenderness. Comments: Some right shoulder discomfort w/ cross body adduction and w/ internal rotation. Full arc of abduction, external rotation and extension w/o movement pain. Full fwd flexion w/ mild discomfort at 120 degrees. BUE strength 5/5 and equal.    Neurological:      Mental Status: She is alert. Motor: Motor function is intact. No weakness. Deep Tendon Reflexes: Reflexes are normal and symmetric. ASSESSMENT & PLAN       ICD-10-CM ICD-9-CM    1. Chronic right shoulder pain  M25.511 719.41 XR SHOULDER RT AP/LAT MIN 2 V    G89.29 338.29 REFERRAL TO PHYSICAL THERAPY   2. Decreased range of motion of right shoulder  M25.611 719.51 XR SHOULDER RT AP/LAT MIN 2 V      REFERRAL TO PHYSICAL THERAPY     Further recommendations pending xray findings and how she comes along clinically in the interim. Otherwise follow up after PT, sooner prn.

## 2021-07-16 NOTE — TELEPHONE ENCOUNTER
Advise patient radiologist reports her shoulder xray shows moderate degree of degenerative arthritis in the [facility] [place], operated by [facility] joint. Proceed w/ PT.  Call back if not improving so can refer her to Ortho

## 2021-07-28 ENCOUNTER — HOSPITAL ENCOUNTER (OUTPATIENT)
Dept: PHYSICAL THERAPY | Age: 71
Discharge: HOME | End: 2021-07-28
Attending: FAMILY MEDICINE
Payer: MEDICARE

## 2021-07-28 DIAGNOSIS — M25.511 CHRONIC RIGHT SHOULDER PAIN: ICD-10-CM

## 2021-07-28 DIAGNOSIS — M25.611 DECREASED RANGE OF MOTION OF RIGHT SHOULDER: ICD-10-CM

## 2021-07-28 DIAGNOSIS — G89.29 CHRONIC RIGHT SHOULDER PAIN: ICD-10-CM

## 2021-07-28 PROCEDURE — 97161 PT EVAL LOW COMPLEX 20 MIN: CPT | Performed by: PHYSICAL THERAPY ASSISTANT

## 2021-07-28 PROCEDURE — 97140 MANUAL THERAPY 1/> REGIONS: CPT | Performed by: PHYSICAL THERAPY ASSISTANT

## 2021-07-28 NOTE — PROGRESS NOTES
PT INITIAL EVALUATION NOTE - [facility] 2-15    Patient Name: Laith Blood  Date:2021  : 1950  [x]  Patient  Verified  Payor: Mar Mariscal / Plan: Anastasiia Perez / Product Type: Managed Care Medicare /    In time: 10:25  Out time: 11:05 am  Total Treatment Time (min): 40  Total Timed Codes (min): 15  1:1 Treatment Time ( only): 15   Visit #: 1     Treatment Area: Chronic right shoulder pain [M25.511, G89.29]  Decreased range of motion of right shoulder [M25.611]    SUBJECTIVE  Pain Level (0-10 scale): 3  Any medication changes, allergies to medications, adverse drug reactions, diagnosis change, or new procedure performed?: [] No    [x] Yes (see summary sheet for update)  Subjective:    Pt complains of R shoulder pain that started in 2020 when using a gas powered leaf blower. Pt reports she noticed soreness after using this but it worsened over the next few months. Pt states 2 months ago she noticed she had difficulty putting on/taking off her bra and reaching behind her back. Pt states she had frozen shoulder about 20 years ago but can't remember which shoulder it was.   PLOF: yard work  Mechanism of Injury: leaf blower  Previous Treatment/Compliance: good  PMHx/Surgical Hx: see chart  Work Hx: telecommunications w/ [employer]  Living Situation: home  Pt Goals: \"improve ROM\"  Barriers: none  Motivation: good  Substance use: none  Cognition: A & O x 4        OBJECTIVE/EXAMINATION  Posture:  Protracted shoulders  Other Observations:  n/a  Functional  and Pinch:  NT  Palpation: no TTP    R shoulder AROM: abd 145 deg w/ pain, IR to L5, ER to C5  R shoulder PROM: flexion 130 deg w/ pain, ER 70 deg w/ pain, IR 35 deg w/ pain  Cervical ROM is full     UPPER QUARTER   MUSCLE STRENGTH  KEY       R  L  0 - No Contraction  C1, C2 Neck Flex 5  5  1 - Trace   C3 Side Flex  5  5  2 - Poor   C4 Sh Elev  5  5  3 - Fair    C5 Deltoid/Biceps 5  5  4 - Good   C6 Wrist Ext  5  5  5 - Normal   C7 Triceps  5  5      C8 Thumb Ext  5  5      T1 Hand Inst  5  5    MMT: shoulder ER and IR 5/5  Neurological: Reflexes / Sensations: normal  Special Tests: - drop arm, - speed's      Modality rationale: decrease edema, decrease inflammation and decrease pain to improve the patients ability to perform ADL's   Min Type Additional Details    [] Estim: []Att   []Unatt        []TENS instruct                  []IFC  []Premod   []NMES                     []Other:  []w/US   []w/ice   []w/heat  Position:  Location:    []  Traction: [] Cervical       []Lumbar                       [] Prone          []Supine                       []Intermittent   []Continuous Lbs:  [] before manual  [] after manual  []w/heat    []  Ultrasound: []Continuous   [] Pulsed at:                           []1MHz   []3MHz Location:  W/cm2:    [] Paraffin         Location:   []w/heat   10 [x]  Ice     []  Heat  []  Ice massage Position: supine  Location: R shoulder    []  Laser  []  Other: Position:  Location:      []  Vasopneumatic Device Pressure:       [] lo [] med [] hi   Temperature:      [x] Skin assessment post-treatment:  [x]intact []redness- no adverse reaction    []redness - adverse reaction:     5 min Therapeutic Exercise:  [x] See flow sheet :   Rationale: increase ROM and increase strength to improve the patients ability to perform ADL's    10 min Manual Therapy: passive shoulder ROM in all directions, A/P mobs and inferior mobs on GH joint grade 3    Rationale: decrease pain, increase ROM, increase tissue extensibility and decrease trigger points to improve the patients ability to perform ADL's          With   [x] TE   [] TA   [] Neuro   [] SC   [] other: Patient Education: [x] Review HEP    [] Progressed/Changed HEP based on:   [x] positioning   [x] body mechanics   [] transfers   [x] heat/ice application    [x] other: stretching, adhesive capsulitis     Other Objective/Functional Measures: NT    Pain Level (0-10 scale) post treatment: 2    ASSESSMENT/Changes in Function:     [x]  See Plan of [address] Carreon, OCS 7/28/2021

## 2021-07-28 NOTE — PROGRESS NOTES
Physical Therapy at [facility],   a part of [facility]  [address]  [place], [address]  Phone: [phone]  Fax: [phone]    Plan of Care/Statement of Necessity for Physical Therapy Services  2-15    Patient name: Tigist Aquino  : 1950  Provider#: [id number]  Referral source: Jabari Contreras,*      Medical/Treatment Diagnosis: Chronic right shoulder pain [M25.511, G89.29]  Decreased range of motion of right shoulder [M25.611]     Prior Hospitalization: see medical history     Comorbidities: see chart  Prior Level of Function: see chart  Medications: Verified on Patient Summary List  Start of Care: 22      Onset Date: 2020   The [address] [place] [address] and following information is based on the information from the initial evaluation. Assessment/ key information: Pt has signs and symptoms of R adhesive capsulitis that affects her ability to reach overhead, perform ADL's, and sleep. Pt is a good candidate for therapy.     Evaluation Complexity History LOW Complexity : Zero comorbidities / personal factors that will impact the outcome / POC; Examination LOW Complexity : 1-2 Standardized tests and measures addressing body structure, function, activity limitation and / or participation in recreation  ;Presentation LOW Complexity : Stable, uncomplicated  ;Clinical Decision Making LOW Complexity : FOTO score of   Overall Complexity Rating: LOW     Problem List: pain affecting function, decrease ROM, decrease strength, edema affecting function, impaired gait/ balance, decrease ADL/ functional abilitiies, decrease activity tolerance and decrease flexibility/ joint mobility   Treatment Plan may include any combination of the following: Therapeutic exercise, Therapeutic activities, Neuromuscular re-education, Physical agent/modality, Manual therapy and Patient education  Patient / Family readiness to learn indicated by: asking questions  Persons(s) to be included in education: patient (P)  Barriers to Learning/Limitations: None  Patient Goal (s): improve ROM  Patient Self Reported Health Status: good  Rehabilitation Potential: good    Short Term Goals: To be accomplished in 2 weeks:  Pt will be I w/ HEP in order to take active role in therapy  Pt will demonstrate proper posture while sitting for over 20 minutes in order to reduce strain  Pt will apply ice to shoulder at least 2 x day in order to reduce inflammation  Long Term Goals: To be accomplished in 12 weeks:  Pt will demonstrate over 50 deg of shoulder IR ROM in order to dress herself  Pt will be able to perform 30 minutes of yard work w/o increase in pain  Pt will demonstrate over 170 deg of shoulder abd AROM w/o pain in order to perform ADL's  Frequency / Duration: Patient to be seen 1-2 times per week for 12 weeks. Patient/ Caregiver education and instruction: self care, activity modification and exercises    [x]  Plan of care has been reviewed with PTA        Certification Period: 7/28/21-10/20/21  Mandy Goss DPT, OCS 7/28/2021    ________________________________________________________________________    I certify that the above Therapy Services are being furnished while the patient is under my care. I agree with the treatment plan and certify that this therapy is necessary.     Physician's Signature:____________________  Date:____________Time: _________         Dayron Martinez

## 2021-08-04 ENCOUNTER — HOSPITAL ENCOUNTER (OUTPATIENT)
Dept: PHYSICAL THERAPY | Age: 71
Discharge: HOME | End: 2021-08-04
Attending: FAMILY MEDICINE
Payer: MEDICARE

## 2021-08-04 PROCEDURE — 97140 MANUAL THERAPY 1/> REGIONS: CPT | Performed by: PHYSICAL THERAPY ASSISTANT

## 2021-08-04 PROCEDURE — 97110 THERAPEUTIC EXERCISES: CPT | Performed by: PHYSICAL THERAPY ASSISTANT

## 2021-08-04 NOTE — PROGRESS NOTES
PT DAILY TREATMENT NOTE - [facility] 2-15    Patient Name: Nani Ascencio  Date:2021  : 1950  [x]  Patient  Verified  Payor: Neal Guadarrama / Plan: Seth Pickering / Product Type: Managed Care Medicare /    In time:10:05 AM  Out time:11:25 AM  Total Treatment Time (min): 80  Total Timed Codes (min): 70  1:1 Treatment Time ( [address] only): 60   Visit #:  2    Treatment Area: Right shoulder pain [M25.511]    SUBJECTIVE  Pain Level (0-10 scale): 0/10  Any medication changes, allergies to medications, adverse drug reactions, diagnosis change, or new procedure performed?: [x] No    [] Yes (see summary sheet for update)  Subjective functional status/changes:   [] No changes reported  Patient reports compliance with HEP and she is using a bag of frozen peas to ice her shoulder. States she will feel a pull in her shoulder with reaching, continues to have difficulty reaching behind her back.     OBJECTIVE    Modality rationale: decrease inflammation and decrease pain to improve the patients ability to reach, lift, perform ADL's   Min Type Additional Details       [] Estim: []Att   []Unatt    []TENS instruct                  []IFC  []Premod   []NMES                     []Other:  []w/US   []w/ice   []w/heat  Position:  Location:       []  Traction: [] Cervical       []Lumbar                       [] Prone          []Supine                       []Intermittent   []Continuous Lbs:  [] before manual  [] after manual  []w/heat    []  Ultrasound: []Continuous   [] Pulsed                       at: []1MHz   []3MHz Location:  W/cm2:    [] Paraffin         Location:   []w/heat   10 [x]  Ice     []  Heat  []  Ice massage Position: seated with R UE supported  Location: R shoulder    []  Laser  []  Other: Position:  Location:      []  Vasopneumatic Device Pressure:       [] lo [] med [] hi   Temperature:      [x] Skin assessment post-treatment:  [x]intact []redness- no adverse reaction    []redness - adverse reaction: 40 min Therapeutic Exercise:  [x] See flow sheet : reviewed HEP, added per flow sheet   Rationale: increase ROM, increase strength and improve coordination to improve the patients ability to reach, lift and perform ADL's      30 min Manual Therapy: R GH joint oscillations and posterior and inferior mobs grade II-III, PROM all motions to tolerance    Rationale: decrease pain, increase ROM, increase tissue extensibility and decrease trigger points to improve the patients ability to reach, lift and perform ADL's            With   [x] TE   [] TA   [] Neuro   [] SC   [] other: Patient Education: [x] Review HEP    [x] Progressed/Changed HEP based on:  See chart  [] positioning   [] body mechanics   [] transfers   [x] heat/ice application    [x] other: educated patient on expected outcomes and healing process     Other Objective/Functional Measures: NT     Pain Level (0-10 scale) post treatment: 0/10    ASSESSMENT/Changes in Function:   Patient tolerated exercises well, minor cues for correct scapular positioning during TB shoulder rows/extension. Painful at end range PROM flexion and ER. Patient will continue to benefit from skilled PT services to modify and progress therapeutic interventions, address functional mobility deficits, address ROM deficits, address strength deficits, analyze and address soft tissue restrictions, analyze and cue movement patterns, analyze and modify body mechanics/ergonomics and instruct in home and community integration to attain remaining goals.      []  See Plan of Care  []  See progress note/recertification  []  See Discharge Summary         Progress towards goals / Updated goals:  NT    PLAN  []  Upgrade activities as tolerated     [x]  Continue plan of care  []  Update interventions per flow sheet       []  Discharge due to:_  []  Other:_      Carolyn Dewey PTA 8/4/2021

## 2021-08-11 ENCOUNTER — HOSPITAL ENCOUNTER (OUTPATIENT)
Dept: PHYSICAL THERAPY | Age: 71
Discharge: HOME | End: 2021-08-11
Attending: FAMILY MEDICINE
Payer: MEDICARE

## 2021-08-11 PROCEDURE — 97110 THERAPEUTIC EXERCISES: CPT | Performed by: PHYSICAL THERAPY ASSISTANT

## 2021-08-11 PROCEDURE — 97140 MANUAL THERAPY 1/> REGIONS: CPT | Performed by: PHYSICAL THERAPY ASSISTANT

## 2021-08-11 NOTE — PROGRESS NOTES
PT DAILY TREATMENT NOTE - [facility] 2-15    Patient Name: Tigist Aquino  Date:2021  : 1950  [x]  Patient  Verified  Payor: Angie Recinos / Plan: Deborah Pennington / Product Type: Managed Care Medicare /    In time: 5:15 PM  Out time:6:15 PM  Total Treatment Time (min): 60  Total Timed Codes (min): 50  1:1 Treatment Time ( [address] only): 50   Visit #:  3    Treatment Area: Right shoulder pain [M25.511]    SUBJECTIVE  Pain Level (0-10 scale): 0/10  Any medication changes, allergies to medications, adverse drug reactions, diagnosis change, or new procedure performed?: [x] No    [] Yes (see summary sheet for update)  Subjective functional status/changes:   [] No changes reported  Patient reports she thinks her shoulder is getting better but its hard to tell.     OBJECTIVE    Modality rationale: decrease inflammation and decrease pain to improve the patients ability to reach, lift, perform ADL's   Min Type Additional Details       [] Estim: []Att   []Unatt    []TENS instruct                  []IFC  []Premod   []NMES                     []Other:  []w/US   []w/ice   []w/heat  Position:  Location:       []  Traction: [] Cervical       []Lumbar                       [] Prone          []Supine                       []Intermittent   []Continuous Lbs:  [] before manual  [] after manual  []w/heat    []  Ultrasound: []Continuous   [] Pulsed                       at: []1MHz   []3MHz Location:  W/cm2:    [] Paraffin         Location:   []w/heat   10 [x]  Ice     []  Heat  []  Ice massage Position: seated with R UE supported  Location: R shoulder    []  Laser  []  Other: Position:  Location:      []  Vasopneumatic Device Pressure:       [] lo [] med [] hi   Temperature:      [x] Skin assessment post-treatment:  [x]intact []redness- no adverse reaction    []redness - adverse reaction:     30 min Therapeutic Exercise:  [x] See flow sheet : reviewed HEP, added per flow sheet   Rationale: increase ROM, increase strength and improve coordination to improve the patients ability to reach, lift and perform ADL's      20 min Manual Therapy: R GH joint oscillations and posterior and inferior mobs grade II-III, PROM all motions to tolerance    Rationale: decrease pain, increase ROM, increase tissue extensibility and decrease trigger points to improve the patients ability to reach, lift and perform ADL's            With   [x] TE   [] TA   [] Neuro   [] SC   [] other: Patient Education: [x] Review HEP    [x] Progressed/Changed HEP based on:  See chart  [] positioning   [] body mechanics   [] transfers   [x] heat/ice application    [x] other: educated patient on expected outcomes and healing process     Other Objective/Functional Measures: NT     Pain Level (0-10 scale) post treatment: 0/10    ASSESSMENT/Changes in Function:   Pt tolerated there-ex well today and demonstrates improved shoulder ER compared to initial evaluation. Patient will continue to benefit from skilled PT services to modify and progress therapeutic interventions, address functional mobility deficits, address ROM deficits, address strength deficits, analyze and address soft tissue restrictions, analyze and cue movement patterns, analyze and modify body mechanics/ergonomics and instruct in home and community integration to attain remaining goals.      []  See Plan of Care  []  See progress note/recertification  []  See Discharge Summary         Progress towards goals / Updated goals:  NT    PLAN  []  Upgrade activities as tolerated     [x]  Continue plan of care  []  Update interventions per flow sheet       []  Discharge due to:_  []  Other:_      Moni Soto DPT 8/11/2021

## 2021-08-18 ENCOUNTER — HOSPITAL ENCOUNTER (OUTPATIENT)
Dept: PHYSICAL THERAPY | Age: 71
Discharge: HOME | End: 2021-08-18
Attending: FAMILY MEDICINE
Payer: MEDICARE

## 2021-08-18 PROCEDURE — 97110 THERAPEUTIC EXERCISES: CPT | Performed by: PHYSICAL THERAPY ASSISTANT

## 2021-08-18 PROCEDURE — 97140 MANUAL THERAPY 1/> REGIONS: CPT | Performed by: PHYSICAL THERAPY ASSISTANT

## 2021-08-18 NOTE — PROGRESS NOTES
PT DAILY TREATMENT NOTE - [facility] 2-15    Patient Name: Bill Michel  Date:2021  : 1950  [x]  Patient  Verified  Payor: Char Williamson / Plan: Tiffany Mcguire / Product Type: Managed Care Medicare /    In time: 12:00 PM  Out time: 12:50 PM  Total Treatment Time (min): 50  Total Timed Codes (min): 50  1:1 Treatment Time ( [address] only): 50   Visit #:  4    Treatment Area: Right shoulder pain [M25.511]    SUBJECTIVE  Pain Level (0-10 scale): 0/10  Any medication changes, allergies to medications, adverse drug reactions, diagnosis change, or new procedure performed?: [x] No    [] Yes (see summary sheet for update)  Subjective functional status/changes:   [] No changes reported  Patient reports she notices improvements and is able to raise her arm higher.     OBJECTIVE    Modality rationale: decrease inflammation and decrease pain to improve the patients ability to reach, lift, perform ADL's   Min Type Additional Details       [] Estim: []Att   []Unatt    []TENS instruct                  []IFC  []Premod   []NMES                     []Other:  []w/US   []w/ice   []w/heat  Position:  Location:       []  Traction: [] Cervical       []Lumbar                       [] Prone          []Supine                       []Intermittent   []Continuous Lbs:  [] before manual  [] after manual  []w/heat    []  Ultrasound: []Continuous   [] Pulsed                       at: []1MHz   []3MHz Location:  W/cm2:    [] Paraffin         Location:   []w/heat   NT []  Ice     []  Heat  []  Ice massage Position: seated with R UE supported  Location: R shoulder    []  Laser  []  Other: Position:  Location:      []  Vasopneumatic Device Pressure:       [] lo [] med [] hi   Temperature:      [x] Skin assessment post-treatment:  [x]intact []redness- no adverse reaction    []redness - adverse reaction:     30 min Therapeutic Exercise:  [x] See flow sheet : reviewed HEP, added per flow sheet   Rationale: increase ROM, increase strength and improve coordination to improve the patients ability to reach, lift and perform ADL's      20 min Manual Therapy: R GH joint oscillations and posterior and inferior mobs grade II-III, PROM all motions to tolerance    Rationale: decrease pain, increase ROM, increase tissue extensibility and decrease trigger points to improve the patients ability to reach, lift and perform ADL's            With   [x] TE   [] TA   [] Neuro   [] SC   [] other: Patient Education: [x] Review HEP    [x] Progressed/Changed HEP based on:  See chart  [] positioning   [] body mechanics   [] transfers   [x] heat/ice application    [x] other: educated patient on expected outcomes and healing process     Other Objective/Functional Measures: NT     Pain Level (0-10 scale) post treatment: 0/10    ASSESSMENT/Changes in Function:   Pt making good improvements and was able to tolerate increase in resistance exercises. Pt demonstrates shoulder flexion increase compared to last session. Patient will continue to benefit from skilled PT services to modify and progress therapeutic interventions, address functional mobility deficits, address ROM deficits, address strength deficits, analyze and address soft tissue restrictions, analyze and cue movement patterns, analyze and modify body mechanics/ergonomics and instruct in home and community integration to attain remaining goals.      []  See Plan of Care  []  See progress note/recertification  []  See Discharge Summary         Progress towards goals / Updated goals:  NT    PLAN  []  Upgrade activities as tolerated     [x]  Continue plan of care  []  Update interventions per flow sheet       []  Discharge due to:_  []  Other:_      Briana Dec, DPT 8/18/2021

## 2021-08-25 ENCOUNTER — HOSPITAL ENCOUNTER (OUTPATIENT)
Dept: PHYSICAL THERAPY | Age: 71
Discharge: HOME | End: 2021-08-25
Attending: FAMILY MEDICINE
Payer: MEDICARE

## 2021-08-25 PROCEDURE — 97110 THERAPEUTIC EXERCISES: CPT | Performed by: PHYSICAL THERAPY ASSISTANT

## 2021-08-25 PROCEDURE — 97140 MANUAL THERAPY 1/> REGIONS: CPT | Performed by: PHYSICAL THERAPY ASSISTANT

## 2021-08-25 NOTE — PROGRESS NOTES
PT DAILY TREATMENT NOTE - [facility] 2-15    Patient Name: Nani Ascencio  Date:2021  : 1950  [x]  Patient  Verified  Payor: Neal Guadarrama / Plan: Ann Orem / Product Type: Managed Care Medicare /    In time: 12:15 PM  Out time: 1:00 PM  Total Treatment Time (min):45  Total Timed Codes (min): 40  1:1 Treatment Time ( [address] only): 40   Visit #:  5    Treatment Area: Right shoulder pain [M25.511]    SUBJECTIVE  Pain Level (0-10 scale): 0/10  Any medication changes, allergies to medications, adverse drug reactions, diagnosis change, or new procedure performed?: [x] No    [] Yes (see summary sheet for update)  Subjective functional status/changes:   [] No changes reported  Patient reports she is doing well today and slowly improving in ROM.     OBJECTIVE    Modality rationale: decrease inflammation and decrease pain to improve the patients ability to reach, lift, perform ADL's   Min Type Additional Details       [] Estim: []Att   []Unatt    []TENS instruct                  []IFC  []Premod   []NMES                     []Other:  []w/US   []w/ice   []w/heat  Position:  Location:       []  Traction: [] Cervical       []Lumbar                       [] Prone          []Supine                       []Intermittent   []Continuous Lbs:  [] before manual  [] after manual  []w/heat    []  Ultrasound: []Continuous   [] Pulsed                       at: []1MHz   []3MHz Location:  W/cm2:    [] Paraffin         Location:   []w/heat   5 [x]  Ice     []  Heat  []  Ice massage Position: seated with R UE supported  Location: R shoulder    []  Laser  []  Other: Position:  Location:      []  Vasopneumatic Device Pressure:       [] lo [] med [] hi   Temperature:      [x] Skin assessment post-treatment:  [x]intact []redness- no adverse reaction    []redness - adverse reaction:     25 min Therapeutic Exercise:  [x] See flow sheet : reviewed HEP, added per flow sheet   Rationale: increase ROM, increase strength and improve coordination to improve the patients ability to reach, lift and perform ADL's      15 min Manual Therapy: R GH joint oscillations and posterior and inferior mobs grade II-III, PROM all motions to tolerance    Rationale: decrease pain, increase ROM, increase tissue extensibility and decrease trigger points to improve the patients ability to reach, lift and perform ADL's            With   [x] TE   [] TA   [] Neuro   [] SC   [] other: Patient Education: [x] Review HEP    [x] Progressed/Changed HEP based on:  See chart  [] positioning   [] body mechanics   [] transfers   [x] heat/ice application    [x] other: educated patient on expected outcomes and healing process     Other Objective/Functional Measures: NT     Pain Level (0-10 scale) post treatment: 0/10    ASSESSMENT/Changes in Function:   Pt tolerated there-ex well today and demonstrates full shoulder ER ROM after manual techniques. Patient will continue to benefit from skilled PT services to modify and progress therapeutic interventions, address functional mobility deficits, address ROM deficits, address strength deficits, analyze and address soft tissue restrictions, analyze and cue movement patterns, analyze and modify body mechanics/ergonomics and instruct in home and community integration to attain remaining goals.      []  See Plan of Care  []  See progress note/recertification  []  See Discharge Summary         Progress towards goals / Updated goals:  Progressing well    PLAN  []  Upgrade activities as tolerated     [x]  Continue plan of care  []  Update interventions per flow sheet       []  Discharge due to:_  []  Other:_      Mandy Goss DPT 8/25/2021

## 2021-09-01 ENCOUNTER — HOSPITAL ENCOUNTER (OUTPATIENT)
Dept: PHYSICAL THERAPY | Age: 71
Discharge: HOME | End: 2021-09-01
Attending: FAMILY MEDICINE
Payer: MEDICARE

## 2021-09-01 PROCEDURE — 97110 THERAPEUTIC EXERCISES: CPT | Performed by: PHYSICAL THERAPY ASSISTANT

## 2021-09-01 PROCEDURE — 97140 MANUAL THERAPY 1/> REGIONS: CPT | Performed by: PHYSICAL THERAPY ASSISTANT

## 2021-09-01 NOTE — PROGRESS NOTES
PT DAILY TREATMENT NOTE - [facility] 2-15    Patient Name: Bill Michel  Date:2021  : 1950  [x]  Patient  Verified  Payor: Char Williamson / Plan: Tiffany Mcguire / Product Type: Managed Care Medicare /    In time: 12:10 PM  Out time: 1:05 PM  Total Treatment Time (min):55  Total Timed Codes (min): 50  1:1 Treatment Time ([facility] only): 50   Visit #:  6    Treatment Area: Right shoulder pain [M25.511]    SUBJECTIVE  Pain Level (0-10 scale): 0/10  Any medication changes, allergies to medications, adverse drug reactions, diagnosis change, or new procedure performed?: [x] No    [] Yes (see summary sheet for update)  Subjective functional status/changes:   [] No changes reported  Patient reports she is a little sore from being on vacation but believes her shoulder is much better overall. Pt would like to continue PT for a few more weeks.     OBJECTIVE    Modality rationale: decrease inflammation and decrease pain to improve the patients ability to reach, lift, perform ADL's   Min Type Additional Details       [] Estim: []Att   []Unatt    []TENS instruct                  []IFC  []Premod   []NMES                     []Other:  []w/US   []w/ice   []w/heat  Position:  Location:       []  Traction: [] Cervical       []Lumbar                       [] Prone          []Supine                       []Intermittent   []Continuous Lbs:  [] before manual  [] after manual  []w/heat    []  Ultrasound: []Continuous   [] Pulsed                       at: []1MHz   []3MHz Location:  W/cm2:    [] Paraffin         Location:   []w/heat   5 [x]  Ice     []  Heat  []  Ice massage Position: seated with R UE supported  Location: R shoulder    []  Laser  []  Other: Position:  Location:      []  Vasopneumatic Device Pressure:       [] lo [] med [] hi   Temperature:      [x] Skin assessment post-treatment:  [x]intact []redness- no adverse reaction    []redness - adverse reaction:     35 min Therapeutic Exercise:  [x] See flow sheet : reviewed HEP, added per flow sheet   Rationale: increase ROM, increase strength and improve coordination to improve the patients ability to reach, lift and perform ADL's      15 min Manual Therapy: R GH joint oscillations and posterior and inferior mobs grade II-III, PROM all motions to tolerance    Rationale: decrease pain, increase ROM, increase tissue extensibility and decrease trigger points to improve the patients ability to reach, lift and perform ADL's            With   [x] TE   [] TA   [] Neuro   [] SC   [] other: Patient Education: [x] Review HEP    [x] Progressed/Changed HEP based on:  See chart  [] positioning   [] body mechanics   [] transfers   [x] heat/ice application    [x] other: educated patient on expected outcomes and healing process     Other Objective/Functional Measures:   Posture: normal  Palpation: mildly TTP over R pec major  R shoulder AROM: abd 170 deg, ER to T2, IR to T6  R shoulder PROM: flexion 160 deg, ER 80 deg, IR 50 deg   R shoulder strength: ER 4/5, IR 5/5, abd 4/5    Pain Level (0-10 scale) post treatment: 0/10    ASSESSMENT/Changes in Function:   Pt has made good progress over the past few weeks and demonstrates improved shoulder ROM compared to eval. Pt is able to perform ADL's at home w/ less stiffness and is making progress each week. Pt continues to have hard end feel and signs/symptoms of adhesive capsulitis. Pt has been given updated HEP and will continue to benefit from 3-4 more weeks of skilled PT in order to fully regain shoulder mobility and function. Patient will continue to benefit from skilled PT services to modify and progress therapeutic interventions, address functional mobility deficits, address ROM deficits, address strength deficits, analyze and address soft tissue restrictions, analyze and cue movement patterns, analyze and modify body mechanics/ergonomics and instruct in home and community integration to attain remaining goals.      []  See Plan of Care  [x]  See progress note/recertification  []  See Discharge Summary         Progress towards goals / Updated goals:  See note    PLAN  []  Upgrade activities as tolerated     [x]  Continue plan of care  []  Update interventions per flow sheet       []  Discharge due to:_  []  Other:_      Estefany Godoy DPT 9/1/2021

## 2021-09-01 NOTE — PROGRESS NOTES
Physical Therapy at [facility],   a part of [address]  [address] [facility], [address] [place] [address]  Phone: [phone] Fax: [phone]    Progress Note    Name: Jayson Merlin   : 1950   MD: Radha Das,*       Treatment Diagnosis: Right shoulder pain [M25.511]  Start of Care: 21    Visits from Start of Care: 6  Missed Visits: 0    Objective/Functional Measures:   Posture: normal  Palpation: mildly TTP over R pec major  R shoulder AROM: abd 170 deg, ER to T2, IR to T6  R shoulder PROM: flexion 160 deg, ER 80 deg, IR 50 deg   R shoulder strength: ER 4/5, IR 5/5, abd 4/5    Pain Level (0-10 scale) post treatment: 0/10    ASSESSMENT/Changes in Function:   Pt has made good progress over the past few weeks and demonstrates improved shoulder ROM compared to eval. Pt is able to perform ADL's at home w/ less stiffness and is making progress each week. Pt continues to have hard end feel and signs/symptoms of adhesive capsulitis. Pt has been given updated HEP and will continue to benefit from 3-4 more weeks of skilled PT in order to fully regain shoulder mobility and function. Short Term Goals: To be accomplished in 2 weeks:  Pt will be I w/ HEP in order to take active role in therapy- MET  Pt will demonstrate proper posture while sitting for over 20 minutes in order to reduce strain- MET  Pt will apply ice to shoulder at least 2 x day in order to reduce inflammation- MET  Long Term Goals:  To be accomplished in 12 weeks:  Pt will demonstrate over 50 deg of shoulder IR ROM in order to dress herself- MET  Pt will be able to perform 30 minutes of yard work w/o increase in pain- NOT MET  Pt will demonstrate over 170 deg of shoulder abd AROM w/o pain in order to perform ADL's- NOT MET    Alma Kenyon DPT 2021

## 2021-09-08 ENCOUNTER — HOSPITAL ENCOUNTER (OUTPATIENT)
Dept: PHYSICAL THERAPY | Age: 71
Discharge: HOME | End: 2021-09-08
Attending: FAMILY MEDICINE
Payer: MEDICARE

## 2021-09-08 PROCEDURE — 97140 MANUAL THERAPY 1/> REGIONS: CPT | Performed by: PHYSICAL THERAPY ASSISTANT

## 2021-09-08 PROCEDURE — 97110 THERAPEUTIC EXERCISES: CPT | Performed by: PHYSICAL THERAPY ASSISTANT

## 2021-09-08 NOTE — PROGRESS NOTES
PT DAILY TREATMENT NOTE - [facility] 2-15    Patient Name: Germaine Decker  Date:2021  : 1950  [x]  Patient  Verified  Payor: Cami Alexis / Plan: Jermaine Franco / Product Type: Managed Care Medicare /    In time: 9:40 AM  Out time: 10:25 AM  Total Treatment Time (min):45  Total Timed Codes (min): 40  1:1 Treatment Time ( only): 40   Visit #:  7    Treatment Area: Right shoulder pain [M25.511]    SUBJECTIVE  Pain Level (0-10 scale): 0/10  Any medication changes, allergies to medications, adverse drug reactions, diagnosis change, or new procedure performed?: [x] No    [] Yes (see summary sheet for update)  Subjective functional status/changes:   [] No changes reported  Patient reports she over did it over the weekend while working in the yard but her shoulder was not too sore.     OBJECTIVE    Modality rationale: decrease inflammation and decrease pain to improve the patients ability to reach, lift, perform ADL's   Min Type Additional Details       [] Estim: []Att   []Unatt    []TENS instruct                  []IFC  []Premod   []NMES                     []Other:  []w/US   []w/ice   []w/heat  Position:  Location:       []  Traction: [] Cervical       []Lumbar                       [] Prone          []Supine                       []Intermittent   []Continuous Lbs:  [] before manual  [] after manual  []w/heat    []  Ultrasound: []Continuous   [] Pulsed                       at: []1MHz   []3MHz Location:  W/cm2:    [] Paraffin         Location:   []w/heat   5 [x]  Ice     []  Heat  []  Ice massage Position: seated with R UE supported  Location: R shoulder    []  Laser  []  Other: Position:  Location:      []  Vasopneumatic Device Pressure:       [] lo [] med [] hi   Temperature:      [x] Skin assessment post-treatment:  [x]intact []redness- no adverse reaction    []redness - adverse reaction:     30 min Therapeutic Exercise:  [x] See flow sheet : reviewed HEP, added per flow sheet Rationale: increase ROM, increase strength and improve coordination to improve the patients ability to reach, lift and perform ADL's      10 min Manual Therapy: R GH joint oscillations and posterior and inferior mobs grade II-III, PROM all motions to tolerance    Rationale: decrease pain, increase ROM, increase tissue extensibility and decrease trigger points to improve the patients ability to reach, lift and perform ADL's            With   [x] TE   [] TA   [] Neuro   [] SC   [] other: Patient Education: [x] Review HEP    [x] Progressed/Changed HEP based on:  See chart  [] positioning   [] body mechanics   [] transfers   [x] heat/ice application    [x] other: educated patient on expected outcomes and healing process     Other Objective/Functional Measures:   NT    Pain Level (0-10 scale) post treatment: 0/10    ASSESSMENT/Changes in Function:   Pt tolerated new stretches to address ER/IR limitations well today. Pt educated on updated HEP. Patient will continue to benefit from skilled PT services to modify and progress therapeutic interventions, address functional mobility deficits, address ROM deficits, address strength deficits, analyze and address soft tissue restrictions, analyze and cue movement patterns, analyze and modify body mechanics/ergonomics and instruct in home and community integration to attain remaining goals.      []  See Plan of Care  []  See progress note/recertification  []  See Discharge Summary         Progress towards goals / Updated goals:  NT    PLAN  []  Upgrade activities as tolerated     [x]  Continue plan of care  []  Update interventions per flow sheet       []  Discharge due to:_  []  Other:_      Kamran Cruz DPT 9/8/2021

## 2021-09-15 ENCOUNTER — HOSPITAL ENCOUNTER (OUTPATIENT)
Dept: PHYSICAL THERAPY | Age: 71
Discharge: HOME | End: 2021-09-15
Attending: FAMILY MEDICINE
Payer: MEDICARE

## 2021-09-15 PROCEDURE — 97140 MANUAL THERAPY 1/> REGIONS: CPT | Performed by: PHYSICAL THERAPY ASSISTANT

## 2021-09-15 PROCEDURE — 97110 THERAPEUTIC EXERCISES: CPT | Performed by: PHYSICAL THERAPY ASSISTANT

## 2021-09-15 NOTE — PROGRESS NOTES
PT DAILY TREATMENT NOTE - [facility] 2-15    Patient Name: Candie Leroy  Date:9/15/2021  : 1950  [x]  Patient  Verified  Payor: Brandan Murrieta / Plan: Minnie Archuleta / Product Type: Managed Care Medicare /    In time: 12:50 PM  Out time: 1:35 PM  Total Treatment Time (min):45  Total Timed Codes (min): 40  1:1 Treatment Time ([facility] only): 40   Visit #:  8    Treatment Area: Right shoulder pain [M25.511]    SUBJECTIVE  Pain Level (0-10 scale): 0/10  Any medication changes, allergies to medications, adverse drug reactions, diagnosis change, or new procedure performed?: [x] No    [] Yes (see summary sheet for update)  Subjective functional status/changes:   [] No changes reported  Patient reports she notices good improvements reaching overhead but still limited in behind her back.     OBJECTIVE    Modality rationale: decrease inflammation and decrease pain to improve the patients ability to reach, lift, perform ADL's   Min Type Additional Details       [] Estim: []Att   []Unatt    []TENS instruct                  []IFC  []Premod   []NMES                     []Other:  []w/US   []w/ice   []w/heat  Position:  Location:       []  Traction: [] Cervical       []Lumbar                       [] Prone          []Supine                       []Intermittent   []Continuous Lbs:  [] before manual  [] after manual  []w/heat    []  Ultrasound: []Continuous   [] Pulsed                       at: []1MHz   []3MHz Location:  W/cm2:    [] Paraffin         Location:   []w/heat   5 [x]  Ice     []  Heat  []  Ice massage Position: seated with R UE supported  Location: R shoulder    []  Laser  []  Other: Position:  Location:      []  Vasopneumatic Device Pressure:       [] lo [] med [] hi   Temperature:      [x] Skin assessment post-treatment:  [x]intact []redness- no adverse reaction    []redness - adverse reaction:     30 min Therapeutic Exercise:  [x] See flow sheet : reviewed HEP, added per flow sheet   Rationale: increase ROM, increase strength and improve coordination to improve the patients ability to reach, lift and perform ADL's      10 min Manual Therapy: R GH joint oscillations and posterior and inferior mobs grade II-III, PROM all motions to tolerance    Rationale: decrease pain, increase ROM, increase tissue extensibility and decrease trigger points to improve the patients ability to reach, lift and perform ADL's            With   [x] TE   [] TA   [] Neuro   [] SC   [] other: Patient Education: [x] Review HEP    [x] Progressed/Changed HEP based on:  See chart  [] positioning   [] body mechanics   [] transfers   [x] heat/ice application    [x] other: educated patient on expected outcomes and healing process     Other Objective/Functional Measures:   NT    Pain Level (0-10 scale) post treatment: 0/10    ASSESSMENT/Changes in Function:   Pt making good progress w/ shoulder flexion and ER ROM. Pt given updated HEP to address IR restrictions. Patient will continue to benefit from skilled PT services to modify and progress therapeutic interventions, address functional mobility deficits, address ROM deficits, address strength deficits, analyze and address soft tissue restrictions, analyze and cue movement patterns, analyze and modify body mechanics/ergonomics and instruct in home and community integration to attain remaining goals.      []  See Plan of Care  []  See progress note/recertification  []  See Discharge Summary         Progress towards goals / Updated goals:  NT    PLAN  []  Upgrade activities as tolerated     [x]  Continue plan of care  []  Update interventions per flow sheet       []  Discharge due to:_  []  Other:_      Alison Peres DPT 9/15/2021

## 2021-09-29 ENCOUNTER — HOSPITAL ENCOUNTER (OUTPATIENT)
Dept: PHYSICAL THERAPY | Age: 71
Discharge: HOME | End: 2021-09-29
Attending: FAMILY MEDICINE
Payer: MEDICARE

## 2021-09-29 ENCOUNTER — APPOINTMENT (OUTPATIENT)
Dept: PHYSICAL THERAPY | Age: 71
End: 2021-09-29
Attending: FAMILY MEDICINE
Payer: MEDICARE

## 2021-09-29 PROCEDURE — 97110 THERAPEUTIC EXERCISES: CPT | Performed by: PHYSICAL THERAPY ASSISTANT

## 2021-09-29 PROCEDURE — 97140 MANUAL THERAPY 1/> REGIONS: CPT | Performed by: PHYSICAL THERAPY ASSISTANT

## 2021-09-29 NOTE — PROGRESS NOTES
PT DAILY TREATMENT NOTE - [facility] 2-15    Patient Name: Shannen Chow  Date:2021  : 1950  [x]  Patient  Verified  Payor: Justice Carolina / Plan: Ruthann Perkins / Product Type: Managed Care Medicare /    In time: 10:10 AM  Out time: 10:55 AM  Total Treatment Time (min):45  Total Timed Codes (min): 40  1:1 Treatment Time ( only): 40   Visit #:  9    Treatment Area: Right shoulder pain [M25.511]    SUBJECTIVE  Pain Level (0-10 scale): 0/10  Any medication changes, allergies to medications, adverse drug reactions, diagnosis change, or new procedure performed?: [x] No    [] Yes (see summary sheet for update)  Subjective functional status/changes:   [] No changes reported    Patient  10 minutes late for appointment. She just returned from vacation and has been very compliant with her HEP. States her motion is overall much improved however \"it's not like the other shoulder\" States she continues to have difficulty fastening her bra behind her back.     OBJECTIVE    Modality rationale: decrease inflammation and decrease pain to improve the patients ability to reach, lift, perform ADL's   Min Type Additional Details       [] Estim: []Att   []Unatt    []TENS instruct                  []IFC  []Premod   []NMES                     []Other:  []w/US   []w/ice   []w/heat  Position:  Location:       []  Traction: [] Cervical       []Lumbar                       [] Prone          []Supine                       []Intermittent   []Continuous Lbs:  [] before manual  [] after manual  []w/heat    []  Ultrasound: []Continuous   [] Pulsed                       at: []1MHz   []3MHz Location:  W/cm2:    [] Paraffin         Location:   []w/heat   5 [x]  Ice     []  Heat  []  Ice massage Position: seated with R UE supported  Location: R shoulder    []  Laser  []  Other: Position:  Location:      []  Vasopneumatic Device Pressure:       [] lo [] med [] hi   Temperature:      [x] Skin assessment post-treatment: [x]intact []redness- no adverse reaction    []redness - adverse reaction:     30 min Therapeutic Exercise:  [x] See flow sheet : reviewed HEP, added per flow sheet   Rationale: increase ROM, increase strength and improve coordination to improve the patients ability to reach, lift and perform ADL's      10 min Manual Therapy: R GH joint oscillations and posterior and inferior mobs grade II-III, PROM all motions to tolerance    Rationale: decrease pain, increase ROM, increase tissue extensibility and decrease trigger points to improve the patients ability to reach, lift and perform ADL's            With   [x] TE   [] TA   [] Neuro   [] SC   [] other: Patient Education: [x] Review HEP    [x] Progressed/Changed HEP based on:  See chart  [] positioning   [] body mechanics   [] transfers   [x] heat/ice application    [x] other: educated patient on expected outcomes and healing process     Other Objective/Functional Measures:   NT    Pain Level (0-10 scale) post treatment: 0/10    ASSESSMENT/Changes in Function:   Greatest restriction with R shoulder IR ROM but overall much improved since evaluation. Patient will continue to benefit from skilled PT services to modify and progress therapeutic interventions, address functional mobility deficits, address ROM deficits, address strength deficits, analyze and address soft tissue restrictions, analyze and cue movement patterns, analyze and modify body mechanics/ergonomics and instruct in home and community integration to attain remaining goals.      []  See Plan of Care  []  See progress note/recertification  []  See Discharge Summary         Progress towards goals / Updated goals:  NT    PLAN  []  Upgrade activities as tolerated     [x]  Continue plan of care  []  Update interventions per flow sheet       []  Discharge due to:_  []  Other:_      Madison Cadet, PTA 9/29/2021

## 2021-10-06 ENCOUNTER — HOSPITAL ENCOUNTER (OUTPATIENT)
Dept: PHYSICAL THERAPY | Age: 71
Discharge: HOME | End: 2021-10-06
Attending: FAMILY MEDICINE
Payer: MEDICARE

## 2021-10-06 ENCOUNTER — APPOINTMENT (OUTPATIENT)
Dept: PHYSICAL THERAPY | Age: 71
End: 2021-10-06
Attending: FAMILY MEDICINE
Payer: MEDICARE

## 2021-10-06 PROCEDURE — 97140 MANUAL THERAPY 1/> REGIONS: CPT | Performed by: PHYSICAL THERAPY ASSISTANT

## 2021-10-06 PROCEDURE — 97110 THERAPEUTIC EXERCISES: CPT | Performed by: PHYSICAL THERAPY ASSISTANT

## 2021-10-06 NOTE — PROGRESS NOTES
PT DAILY TREATMENT NOTE - [facility] 2-15    Patient Name: Mariah Kern  Date:10/6/2021  : 1950  [x]  Patient  Verified  Payor: Alia Berrios / Plan: [employer] / Product Type: Managed Care Medicare /    In time: 1:10 PM  Out time: 1:50 PM  Total Treatment Time (min):40  Total Timed Codes (min): 40  1:1 Treatment Time ( [address] only): 40   Visit #:  10  Medicare POC Expiration: 10/20/2021    Treatment Area: Right shoulder pain [M25.511]    SUBJECTIVE  Pain Level (0-10 scale): 0/10  Any medication changes, allergies to medications, adverse drug reactions, diagnosis change, or new procedure performed?: [x] No    [] Yes (see summary sheet for update)  Subjective functional status/changes:   [] No changes reported    Patient  10 minutes late for appointment. States overall her R shoulder is much better she is able to reach into cabinets without limitations, however she continues to feel reaching behind her back isn't as good as her other shoulder. OBJECTIVE      Posture: normal  Palpation: no tenderness   R shoulder AROM: abd 170 deg, ER to T3, IR to T12 R, T6 L  R shoulder PROM: flexion 170 deg, ER 80 deg, IR 75 deg     R shoulder strength: ER 5/5, IR 5/5, abd 5/5, flex 4/5    Modality rationale: decrease inflammation and decrease pain to improve the patients ability to reach, lift, perform ADL's   Min Type Additional Details       [] Estim: []Att   []Unatt    []TENS instruct                  []IFC  []Premod   []NMES                     []Other:  []w/US   []w/ice   []w/heat  Position:  Location:       []  Traction: [] Cervical       []Lumbar                       [] Prone          []Supine                       []Intermittent   []Continuous Lbs:  [] before manual  [] after manual  []w/heat    []  Ultrasound: []Continuous   [] Pulsed                       at: []1MHz   []3MHz Location:  W/cm2:    [] Paraffin         Location:   []w/heat   decl.  [x]  Ice     []  Heat  []  Ice massage Position: seated with R UE supported  Location: R shoulder    []  Laser  []  Other: Position:  Location:      []  Vasopneumatic Device Pressure:       [] lo [] med [] hi   Temperature:      [x] Skin assessment post-treatment:  [x]intact []redness- no adverse reaction    []redness - adverse reaction:     30 min Therapeutic Exercise:  [x] See flow sheet : reviewed HEP, added per flow sheet obtained objective measures. Rationale: increase ROM, increase strength and improve coordination to improve the patients ability to reach, lift and perform ADL's      10 min Manual Therapy: R GH joint oscillations and posterior and inferior mobs grade II-III, PROM all motions to tolerance    Rationale: decrease pain, increase ROM, increase tissue extensibility and decrease trigger points to improve the patients ability to reach, lift and perform ADL's            With   [x] TE   [] TA   [] Neuro   [] SC   [] other: Patient Education: [x] Review HEP    [x] Progressed/Changed HEP based on:  See chart  [] positioning   [] body mechanics   [] transfers   [x] heat/ice application    [x] other: educated patient on expected outcomes and healing process     Other Objective/Functional Measures:   NT    Pain Level (0-10 scale) post treatment: 0/10    ASSESSMENT/Changes in Function:   Patient with improved R shoulder A/PROM compared to last reassessment on 9/1/2021 however continues to have slight restriction with IR. Reprinted HEP focusing on IR and posterior capsule stretching. Patient to try 2 weeks on her own then schedule 1 last visit with primary PT before  10/20/21 POC expiration.    Patient will continue to benefit from skilled PT services to modify and progress therapeutic interventions, address functional mobility deficits, address ROM deficits, address strength deficits, analyze and address soft tissue restrictions, analyze and cue movement patterns, analyze and modify body mechanics/ergonomics and instruct in home and community integration to attain remaining goals.      []  See Plan of Care  []  See progress note/recertification  []  See Discharge Summary         Progress towards goals / Updated goals:  NT    PLAN  []  Upgrade activities as tolerated     [x]  Continue plan of care  []  Update interventions per flow sheet       []  Discharge due to:_  []  Other:_      Marlena Valencia, PTA 10/6/2021

## 2021-10-12 ENCOUNTER — HOSPITAL ENCOUNTER (OUTPATIENT)
Dept: PHYSICAL THERAPY | Age: 71
Discharge: HOME | End: 2021-10-12
Attending: FAMILY MEDICINE
Payer: MEDICARE

## 2021-10-12 PROCEDURE — 97110 THERAPEUTIC EXERCISES: CPT | Performed by: PHYSICAL THERAPY ASSISTANT

## 2021-10-12 PROCEDURE — 97140 MANUAL THERAPY 1/> REGIONS: CPT | Performed by: PHYSICAL THERAPY ASSISTANT

## 2021-10-12 NOTE — PROGRESS NOTES
PT DAILY TREATMENT NOTE - [facility] 2-15    Patient Name: Roseline Pond  Date:10/12/2021  : 1950  [x]  Patient  Verified  Payor: Angeli Nieves / Plan: Jacolyn Canavan / Product Type: Managed Care Medicare /    In time: 1:45 PM  Out time: 2:30 PM  Total Treatment Time (min):45  Total Timed Codes (min): 40  1:1 Treatment Time ( only): 40   Visit #:  11      Treatment Area: Right shoulder pain [M25.511]    SUBJECTIVE  Pain Level (0-10 scale): 0/10  Any medication changes, allergies to medications, adverse drug reactions, diagnosis change, or new procedure performed?: [x] No    [] Yes (see summary sheet for update)  Subjective functional status/changes:   [] No changes reported    Pt states she is doing well and feels ready to be on her own.     OBJECTIVE    Posture: normal  Palpation: no tenderness   R shoulder AROM: abd 170 deg, ER to T3, IR to T12 R, T6 L  R shoulder PROM: flexion 170 deg, ER 80 deg, IR 75 deg     R shoulder strength: ER 5/5, IR 5/5, abd 5/5, flex 4/5    Modality rationale: decrease inflammation and decrease pain to improve the patients ability to reach, lift, perform ADL's   Min Type Additional Details       [] Estim: []Att   []Unatt    []TENS instruct                  []IFC  []Premod   []NMES                     []Other:  []w/US   []w/ice   []w/heat  Position:  Location:       []  Traction: [] Cervical       []Lumbar                       [] Prone          []Supine                       []Intermittent   []Continuous Lbs:  [] before manual  [] after manual  []w/heat    []  Ultrasound: []Continuous   [] Pulsed                       at: []1MHz   []3MHz Location:  W/cm2:    [] Paraffin         Location:   []w/heat   5 [x]  Ice     []  Heat  []  Ice massage Position: seated with R UE supported  Location: R shoulder    []  Laser  []  Other: Position:  Location:      []  Vasopneumatic Device Pressure:       [] lo [] med [] hi   Temperature:      [x] Skin assessment post-treatment:  [x]intact []redness- no adverse reaction    []redness - adverse reaction:     30 min Therapeutic Exercise:  [x] See flow sheet : reviewed HEP, added per flow sheet obtained objective measures. Rationale: increase ROM, increase strength and improve coordination to improve the patients ability to reach, lift and perform ADL's      10 min Manual Therapy: R GH joint oscillations and posterior and inferior mobs grade II-III, PROM all motions to tolerance    Rationale: decrease pain, increase ROM, increase tissue extensibility and decrease trigger points to improve the patients ability to reach, lift and perform ADL's            With   [x] TE   [] TA   [] Neuro   [] SC   [] other: Patient Education: [x] Review HEP    [x] Progressed/Changed HEP based on:  See chart  [] positioning   [] body mechanics   [] transfers   [x] heat/ice application    [x] other: educated patient on expected outcomes and healing process     Other Objective/Functional Measures:   See above    Pain Level (0-10 scale) post treatment: 0/10    ASSESSMENT/Changes in Function:   Pt has made very good progress overall and feels ready to be d/c'd. Pt has been compliant w/ HEP and understands the expectations w/ her diagnosis. Pt will be d/c'd from PT at this time.     []  See Plan of Care  []  See progress note/recertification  [x]  See Discharge Summary         Progress towards goals / Updated goals:  See d/c    PLAN  []  Upgrade activities as tolerated     []  Continue plan of care  []  Update interventions per flow sheet       [x]  Discharge due to:_MET goals  []  Other:_      Jarad Narvaez DPT 10/12/2021

## 2021-10-19 DIAGNOSIS — E78.00 HYPERCHOLESTEROLEMIA: ICD-10-CM

## 2021-10-19 RX ORDER — ATORVASTATIN CALCIUM 20 MG/1
20 TABLET, FILM COATED ORAL DAILY
Qty: 90 TABLET | Refills: 0 | Status: SHIPPED | OUTPATIENT
Start: 2021-10-19 | End: 2022-01-17

## 2021-10-19 NOTE — TELEPHONE ENCOUNTER
Last Visit: 7/15/21 MD Wilson, lipid 9/2020  Next Appointment: Not scheduled- appt/lab due  Previous Refill Encounter(s): 10/24/20 90 + 3    Requested Prescriptions     Pending Prescriptions Disp Refills    atorvastatin (LIPITOR) 20 mg tablet 90 Tablet 0     Sig: Take 1 Tablet by mouth daily. Appointment/labs due!

## 2021-10-20 NOTE — TELEPHONE ENCOUNTER
Sent in mail order for now. Patient due fasting follow up appointment. Please get booked w/ me asap.

## 2021-11-08 ENCOUNTER — OFFICE VISIT (OUTPATIENT)
Dept: FAMILY MEDICINE CLINIC | Age: 71
End: 2021-11-08
Payer: MEDICARE

## 2021-11-08 VITALS
SYSTOLIC BLOOD PRESSURE: 132 MMHG | DIASTOLIC BLOOD PRESSURE: 74 MMHG | RESPIRATION RATE: 16 BRPM | TEMPERATURE: 98.3 F | WEIGHT: 166.4 LBS | OXYGEN SATURATION: 97 % | HEIGHT: 68 IN | BODY MASS INDEX: 25.22 KG/M2 | HEART RATE: 83 BPM

## 2021-11-08 DIAGNOSIS — Z00.00 MEDICARE ANNUAL WELLNESS VISIT, SUBSEQUENT: Primary | ICD-10-CM

## 2021-11-08 DIAGNOSIS — R94.6 BORDERLINE ABNORMAL THYROID FUNCTION TEST: ICD-10-CM

## 2021-11-08 DIAGNOSIS — Z23 ENCOUNTER FOR IMMUNIZATION: ICD-10-CM

## 2021-11-08 DIAGNOSIS — E55.9 VITAMIN D DEFICIENCY: ICD-10-CM

## 2021-11-08 DIAGNOSIS — E78.00 HYPERCHOLESTEROLEMIA: ICD-10-CM

## 2021-11-08 DIAGNOSIS — Z23 NEEDS FLU SHOT: ICD-10-CM

## 2021-11-08 PROCEDURE — 90694 VACC AIIV4 NO PRSRV 0.5ML IM: CPT | Performed by: FAMILY MEDICINE

## 2021-11-08 PROCEDURE — G0008 ADMIN INFLUENZA VIRUS VAC: HCPCS | Performed by: FAMILY MEDICINE

## 2021-11-08 PROCEDURE — 1090F PRES/ABSN URINE INCON ASSESS: CPT | Performed by: FAMILY MEDICINE

## 2021-11-08 PROCEDURE — G0439 PPPS, SUBSEQ VISIT: HCPCS | Performed by: FAMILY MEDICINE

## 2021-11-08 PROCEDURE — 3017F COLORECTAL CA SCREEN DOC REV: CPT | Performed by: FAMILY MEDICINE

## 2021-11-08 PROCEDURE — G8419 CALC BMI OUT NRM PARAM NOF/U: HCPCS | Performed by: FAMILY MEDICINE

## 2021-11-08 PROCEDURE — G9717 DOC PT DX DEP/BP F/U NT REQ: HCPCS | Performed by: FAMILY MEDICINE

## 2021-11-08 PROCEDURE — G8427 DOCREV CUR MEDS BY ELIG CLIN: HCPCS | Performed by: FAMILY MEDICINE

## 2021-11-08 PROCEDURE — 99214 OFFICE O/P EST MOD 30 MIN: CPT | Performed by: FAMILY MEDICINE

## 2021-11-08 PROCEDURE — G8399 PT W/DXA RESULTS DOCUMENT: HCPCS | Performed by: FAMILY MEDICINE

## 2021-11-08 PROCEDURE — G8536 NO DOC ELDER MAL SCRN: HCPCS | Performed by: FAMILY MEDICINE

## 2021-11-08 PROCEDURE — 1101F PT FALLS ASSESS-DOCD LE1/YR: CPT | Performed by: FAMILY MEDICINE

## 2021-11-08 NOTE — PROGRESS NOTES
Chief Complaint   Patient presents with   [facility] 40 Visit          1. \"Have you been to the ER, urgent care clinic since your last visit? Hospitalized since your last visit? \" No    2. \"Have you seen or consulted any other health care providers outside of the [address] [place] [address] since your last visit? \" No     3. For patients over 45: Has the patient had a colonoscopy? Dr Mcmanus Round 2020    If the patient is female:    4. For patients over 40: Has the patient had a mammogram? Last fall @ [facility]    5. For patients over 21: Has the patient had a pap smear?  Dr Jc Avila

## 2021-11-08 NOTE — PROGRESS NOTES
This is the Subsequent Medicare Annual Wellness Exam, performed 12 months or more after the Initial AWV or the last Subsequent AWV    I have reviewed the patient's medical history in detail and updated the computerized patient record. Assessment/Plan   Education and counseling provided:  Are appropriate based on today's review and evaluation  Influenza Vaccine    1. Medicare annual wellness visit, subsequent  2. Encounter for immunization  -     ADMIN INFLUENZA VIRUS VAC  3. Needs flu shot  -     FLU (FLUAD QUAD INFLUENZA VACCINE,QUAD,ADJUVANTED)       Depression Risk Factor Screening     3 most recent PHQ Screens 11/8/2021   Little interest or pleasure in doing things Not at all   Feeling down, depressed, irritable, or hopeless Not at all   Total Score PHQ 2 0       Alcohol Risk Screen    Do you average more than 1 drink per night or more than 7 drinks a week:  No    On any one occasion in the past three months have you have had more than 3 drinks containing alcohol:  No        Functional Ability and Level of Safety    Hearing: Hearing is good. Activities of Daily Living: The home contains: no safety equipment.   Patient does total self care  ADL Assessment 11/8/2021   Feeding yourself No Help Needed   Getting from bed to chair No Help Needed   Getting dressed No Help Needed   Bathing or showering No Help Needed   Walk across the room (includes cane/walker) No Help Needed   Using the telphone No Help Needed   Taking your medications No Help Needed   Preparing meals No Help Needed   Managing money (expenses/bills) No Help Needed   Moderately strenuous housework (laundry) No Help Needed   Shopping for personal items (toiletries/medicines) No Help Needed   Shopping for groceries No Help Needed   Driving No Help Needed   Climbing a flight of stairs No Help Needed   Getting to places beyond walking distances No Help Needed         Ambulation: with no difficulty     Fall Risk:  Fall Risk Assessment, last 15 mths 11/8/2021   Able to walk? Yes   Fall in past 12 months? 0   Do you feel unsteady? 0   Are you worried about falling 0   Number of falls in past 12 months -   Fall with injury? -      Abuse Screen:  Patient is not abused       Cognitive Screening    Has your family/caregiver stated any concerns about your memory: no         Health Maintenance Due     Health Maintenance Due   Topic Date Due    Breast Cancer Screen Mammogram  07/23/2019    Colorectal Cancer Screening Combo  06/08/2020    Lipid Screen  09/29/2021    COVID-19 Vaccine (3 - Pfizer booster) 09/30/2021       Patient Care Team   Patient Care Team:  Wing Sheldon MD as PCP - General  Wing Sheldon MD as PCP - [facility] Empaneled Provider    History     Patient Active Problem List   Diagnosis Code    IBS (irritable bowel syndrome) K58.9    Perennial allergic rhinitis J30.89    Asthma J45.909    History of iron deficiency anemia Z86.2    Seborrheic keratosis L82.1    Depression with anxiety F41.8    History of breast cancer/DCIS, s/p left mastectomy 2004 and 5yr of Tamoxifen Z85.3    S/P GAUDENCIO (total abdominal hysterectomy) w/ LSO for Uterine Fibroids 2004 Z90.710    S/P colonoscopic polypectomy, benign 2005 Z98.890    Microscopic hematuria, benign R31.29    Osteoarthritis of right hip M16.10    Borderline abnormal thyroid function test R94.6    Vitamin D deficiency E55.9    Hypercholesterolemia E78.00    Ocular migraine G43. 109    ACP (advance care planning) Z71.89     Past Medical History:   Diagnosis Date    ACP (advance care planning) 8/29/2019    ACP discussion 8-2019, AMD form given    Asthma 9/14/2011    Borderline abnormal thyroid function test 1/7/2014 1/2014    Depression with anxiety 3/18/2010    Fe deficiency anemia 3/18/2010    H/O mammogram 05/23/2016    normal, Dr Edel De La Rosa History of bone density study 11/19/2014    normal, Dr Edel De La Rosa History of breast cancer, s/p left mastectomy 2004 and 5yr of Tamoxifen 2011    History of iron deficiency anemia 2011    Hypercholesterolemia 2019    IBS (irritable bowel syndrome) 3/18/2010    Microscopic hematuria, benign 2011    Ocular migraine 2019    Diagnosed by Ophthalmologist  [phone]    Osteoarthritis of right hip 2011    Pap test, as part of routine gynecological examination 10/27/2015    normal Dr Alicia Garay, repeat 1 yr    Perennial allergic rhinitis 2011    S/P colonoscopic polypectomy, benign 2011    S/P GAUDENCIO (total abdominal hysterectomy) w/ LSO for Uterine Fibroids 2011    Seborrheic keratosis 2011    Vitamin D deficiency 2014      Past Surgical History:   Procedure Laterality Date    HAND/FINGER SURGERY UNLISTED      dislocated thumb surgery repair    HX  SECTION  ,     HX COLONOSCOPY  2015    Repeat 5 yrs/Luis Cruz    HX COLONOSCOPY  2020    Polyp, Repeat 5 yrs, Dr. Phill Lloyd  2004    GAUDENCIO-LSO, fibroids    HX MASTECTOMY      left    HX MASTECTOMY  2004    left breast DCIS, total mastectomy and saline implant    HX TUBAL LIGATION      IA COLONOSCOPY W/BIOPSY SINGLE/MULTIPLE      benign polyp; f/u 5 yrs, Dr Dorian Linares  2005    benign polyp; f/u 5 yrs, Dr Kerline Villeda THYROID/PARATHYROID/SOFT TISS      normal     Current Outpatient Medications   Medication Sig Dispense Refill    atorvastatin (LIPITOR) 20 mg tablet Take 1 Tablet by mouth daily. Appointment/labs due! 90 Tablet 0    aspirin delayed-release 81 mg tablet Take  by mouth daily.  albuterol (PROVENTIL HFA, VENTOLIN HFA, PROAIR HFA) 90 mcg/actuation inhaler Take 1 Puff by inhalation every six (6) hours as needed for Wheezing. 1 Inhaler 0    Cetirizine (ZYRTEC) 10 mg cap Take  by mouth daily.  DOCOSAHEXANOIC ACID/EPA (FISH OIL PO) Take 1 Cap by mouth daily.  Takes ~ 3 times a week      Cholecalciferol, Vitamin D3, (VITAMIN D3) 1,000 unit cap Take 1,000 Units by mouth daily.  beclomethasone (QVAR) 80 mcg/actuation inhaler Take 1 Puff by inhalation two (2) times daily as needed.        Allergies   Allergen Reactions    Pravachol [Pravastatin] Myalgia     40 mg    Zocor [Simvastatin] Myalgia     Tolerated 10 mg fine, but aching on 20 mg       Family History   Problem Relation Age of Onset    Breast Cancer Mother         dx in her 52's,  age 58    Cancer Mother         uterine cancer in her 52's, treated    Hypertension Father     Stroke Father          age 62    Seizures Father     Stroke Maternal Grandmother          in her [de-identified]    Cancer Maternal Grandfather         ?stomach cancer    Heart Disease Maternal Grandfather          in his 66's    Seizures Paternal [de-identified]     Alcohol abuse Brother          age 36 from complications    Breast Cancer Cousin         maternal 1st cousin    Breast Cancer Paternal Aunt         in her 66's     Social History     Tobacco Use    Smoking status: Never Smoker    Smokeless tobacco: Never Used   Substance Use Topics    Alcohol use: Yes     Comment: occ wine or beer         Angelica Lerma MD

## 2021-11-08 NOTE — PROGRESS NOTES
Chief Complaint   Patient presents with   [facility] Annual Wellness Visit         Cholesterol Problem     Not fasting       HISTORY OF PRESENT ILLNESS   HPI  Follow up hypercholesterolemia on Lipitor. Not fasting today but prefers to return at a later date fasting to complete her labs  Diet: nothing special just tries to follow sensible diet overall   Caffeine: 20 oz decaff coffee a day, tea 2 x a week, seldom sodas  Exercise: walks 5 x a week x 20 minutes and tries to get in 10K steps per day, stays active in her yard  Weight: stable in the 160's over the years   REVIEW OF SYMPTOMS   Review of Systems   Constitutional: Negative. HENT: Negative. Eyes: Negative. Respiratory: Negative. Cardiovascular: Negative. Gastrointestinal: Negative. Genitourinary: Negative. Musculoskeletal: Negative for myalgias. Completed PT for right frozen shoulder and doing much better. Now doing home stretching exercises and to follow up w/ PT if does not continue to improve. Her ROM is getting much much better. Neurological: Negative. Endo/Heme/Allergies: Negative. Psychiatric/Behavioral: Negative.             PROBLEM LIST/MEDICAL HISTORY     Problem List  Date Reviewed: 11/8/2021          Codes Class Noted    Hypercholesterolemia ICD-10-CM: E78.00  ICD-9-CM: 272.0  8/29/2019        Ocular migraine ICD-10-CM: G43.109  ICD-9-CM: 346.80  8/29/2019    Overview Signed 8/29/2019 11:56 AM by Domonique Jane MD     Diagnosed by Ophthalmologist  [phone]             ACP (advance care planning) ICD-10-CM: Z71.89  ICD-9-CM: V65.49  8/29/2019    Overview Signed 8/29/2019  1:44 PM by Domonique Jane MD     ACP discussion [phone], AMD form given             Borderline abnormal thyroid function test ICD-10-CM: R94.6  ICD-9-CM: 794.5  1/7/2014    Overview Signed 1/7/2014  9:54 PM by Domonique Jane MD     1/2014             Vitamin D deficiency ICD-10-CM: E55.9  ICD-9-CM: 268.9  1/7/2014 Perennial allergic rhinitis ICD-10-CM: J30.89  ICD-9-CM: 477.8  9/14/2011    Overview Addendum 9/14/2011  1:34 PM by Chelsea Perrin MD     2011 Allergist Dr Smith; dust, trees, mold, fall time             Asthma ICD-10-CM: J45.909  ICD-9-CM: 493.90  9/14/2011    Overview Signed 9/14/2011  9:28 AM by Chelsea Perrin MD     PFT's allergist office July 2011, Dr Smith             History of iron deficiency anemia ICD-10-CM: Z86.2  ICD-9-CM: V12.3  9/14/2011        Seborrheic keratosis ICD-10-CM: L82.1  ICD-9-CM: 702.19  9/14/2011    Overview Signed 9/14/2011  9:29 AM by Chelsea Perrin MD     Dermatology, Dr Chang             Depression with anxiety ICD-10-CM: F41.8  ICD-9-CM: 300.4  9/14/2011    Overview Signed 9/14/2011  1:32 PM by Chelsea Perrin MD     [phone]             History of breast cancer/DCIS, s/p left mastectomy 2004 and 5yr of Tamoxifen ICD-10-CM: Z85.3  ICD-9-CM: V10.3  9/14/2011    Overview Addendum 9/14/2011  1:34 PM by Chelsea Perrin MD     DCIS; Onc Dr Jose Luis Wheeler             S/P GAUDENCIO (total abdominal hysterectomy) w/ LSO for Uterine Fibroids 2004 ICD-10-CM: Z90.710  ICD-9-CM: V88.01  9/14/2011        S/P colonoscopic polypectomy, benign 2005 ICD-10-CM: Z98.890  ICD-9-CM: V45.89  9/14/2011    Overview Signed 9/14/2011  1:35 PM by MD Dr Kristopher Becerril Flash             Microscopic hematuria, benign ICD-10-CM: R31.29  ICD-9-CM: 599.72  9/14/2011    Overview Addendum 10/17/2014 10:51 AM by Chelsea Perrin MD     Urology, Dr Ruth Tobin, 2008;  FISH test negative and h/o cystoscopy as well             Osteoarthritis of right hip ICD-10-CM: M16.10  ICD-9-CM: 716.95  9/14/2011    Overview Signed 9/14/2011  1:39 PM by Chelsea Perrin MD     Xrays 1/2005             IBS (irritable bowel syndrome) (Chronic) ICD-10-CM: K58.9  ICD-9-CM: 564.1  3/18/2010                  PAST SURGICAL HISTORY     Past Surgical History:   Procedure Laterality Date    HAND/FINGER SURGERY UNLISTED      dislocated thumb surgery repair    HX  SECTION  ,     HX COLONOSCOPY  2015    Repeat 5 yrs/Luis Cruz    HX COLONOSCOPY  2020    Polyp, Repeat 5 yrs, Dr. Venkat Loza  2004    GAUDENCIO-LSO, fibroids    HX MASTECTOMY      left    HX MASTECTOMY  2004    left breast DCIS, total mastectomy and saline implant    HX TUBAL LIGATION      MA COLONOSCOPY W/BIOPSY SINGLE/MULTIPLE      benign polyp; f/u 5 yrs, Dr Jeannine Patel  2005    benign polyp; f/u 5 yrs, Dr Dina Granado THYROID/PARATHYROID/SOFT TISS      normal         MEDICATIONS     Current Outpatient Medications   Medication Sig    atorvastatin (LIPITOR) 20 mg tablet Take 1 Tablet by mouth daily. Appointment/labs due!  aspirin delayed-release 81 mg tablet Take  by mouth daily.  albuterol (PROVENTIL HFA, VENTOLIN HFA, PROAIR HFA) 90 mcg/actuation inhaler Take 1 Puff by inhalation every six (6) hours as needed for Wheezing.  Cetirizine (ZYRTEC) 10 mg cap Take  by mouth daily.  DOCOSAHEXANOIC ACID/EPA (FISH OIL PO) Take 1 Cap by mouth daily. Takes ~ 3 times a week    Cholecalciferol, Vitamin D3, (VITAMIN D3) 1,000 unit cap Take 1,000 Units by mouth daily.  beclomethasone (QVAR) 80 mcg/actuation inhaler Take 1 Puff by inhalation two (2) times daily as needed. No current facility-administered medications for this visit.           ALLERGIES     Allergies   Allergen Reactions    Pravachol [Pravastatin] Myalgia     40 mg    Zocor [Simvastatin] Myalgia     Tolerated 10 mg fine, but aching on 20 mg          SOCIAL HISTORY     Social History     Tobacco Use    Smoking status: Never Smoker    Smokeless tobacco: Never Used   Substance Use Topics    Alcohol use: Yes     Comment: occ wine or beer     Social History     Social History Narrative        2 daughters    Telephone  for [employer]    Diet: nothing special just tries to follow sensible diet overall     Caffeine: 20 oz decaff coffee a day, tea 2 x a week, seldom sodas    Exercise: walks 5 x a week x 20 minutes and tries to get in 10K steps per day, stays active in her yard    Weight: stable in the 160's over the years         Social History     Substance and Sexual Activity   Sexual Activity Yes    Partners: Male       IMMUNIZATIONS     Immunization History   Administered Date(s) Administered    COVID-19, PFIZER, MRNA, LNP-S, PF, 30MCG/0.3ML DOSE 2021, 2021    Influenza Vaccine 10/01/2013    Influenza Vaccine (Tri) Adjuvanted (>65 Yrs FLUAD TRI 65875) 2020    Influenza, Quadrivalent, Adjuvanted (>65 Yrs FLUAD QUAD 63077) 2021    Pneumococcal Conjugate (PCV-13) 2017    Pneumococcal Polysaccharide (PPSV-23) 2016         FAMILY HISTORY     Family History   Problem Relation Age of Onset    Breast Cancer Mother         dx in her 52's,  age 58    Cancer Mother         uterine cancer in her 52's, treated    Hypertension Father     Stroke Father          age 62    Seizures Father     Stroke Maternal Grandmother          in her [de-identified]    Cancer Maternal Grandfather         ?stomach cancer    Heart Disease Maternal Grandfather          in his 66's    Seizures Paternal Aunt     Alcohol abuse Brother          age 36 from complications    Breast Cancer Cousin         maternal 1st cousin    Breast Cancer Paternal Aunt         in her 66's         VITALS     Visit Vitals  /74 (BP 1 Location: Left upper arm, BP Patient Position: Sitting, BP Cuff Size: Large adult)   Pulse 83   Temp 98.3 °F (36.8 °C) (Oral)   Resp 16   Ht 5' 8\" (1.727 m)   Wt 166 lb 6.4 oz (75.5 kg)   SpO2 97%   BMI 25.30 kg/m²          PHYSICAL EXAMINATION   Physical Exam  Vitals reviewed. Constitutional:       General: She is not in acute distress.   HENT:      Right Ear: Tympanic membrane normal.      Left Ear: Tympanic membrane normal. Eyes:      General: No scleral icterus. Conjunctiva/sclera: Conjunctivae normal.   Neck:      Thyroid: No thyroid mass, thyromegaly or thyroid tenderness. Vascular: No carotid bruit. Cardiovascular:      Rate and Rhythm: Normal rate and regular rhythm. Heart sounds: Normal heart sounds. No murmur heard. No gallop. Pulmonary:      Effort: Pulmonary effort is normal.      Breath sounds: Normal breath sounds. Abdominal:      General: There is no distension. Palpations: Abdomen is soft. There is no mass. Tenderness: There is no abdominal tenderness. Musculoskeletal:         General: No swelling or tenderness. Cervical back: Neck supple. Right lower leg: No edema. Left lower leg: No edema. Lymphadenopathy:      Cervical: No cervical adenopathy. Skin:     General: Skin is warm and dry. Neurological:      General: No focal deficit present. Mental Status: She is alert and oriented to person, place, and time. Mental status is at baseline. Gait: Gait normal.   Psychiatric:         Mood and Affect: Mood normal.          ASSESSMENT & PLAN   Diagnoses and all orders for this visit:    1. Medicare annual wellness visit, subsequent  See separate note under this same encounter visit for Medicare Wellness note. 2. Encounter for immunization  -     ADMIN INFLUENZA VIRUS VAC    3. Needs flu shot  -     FLU (FLUAD QUAD INFLUENZA VACCINE,QUAD,ADJUVANTED)    4. Hypercholesterolemia on Lipitor  -     CBC W/O DIFF; Future  -     METABOLIC PANEL, COMPREHENSIVE; Future  -     LIPID PANEL; Future    5. Borderline abnormal thyroid function test, clinically euthyroid  -     CBC W/O DIFF; Future  -     TSH 3RD GENERATION; Future  -     T4, FREE; Future    6.  Vitamin D deficiency  -     VITAMIN D, 25 HYDROXY; Future    She will RTC for fasting labs in the next 1-2 weeks  Cardiovascular risk and specific lipid/LDL goals reviewed  Reviewed diet, nutrition, exercise, weight management, BMI/goals. Age/risk based screening recommendations, health maintenance & prevention counseling. Cancer screening USPTFS guidelines reviewed w/ pt today. Discussed benefits/positive/negative outcomes of screening based on age/risk stratification. Informed consent for/against screening based on pt's personal hx/risk factors. Updated in history above and health maintenance. Sees gyn annually for well woman visits/gyn exams and pap screens per guidelines, reportedly negative   Mammogram screens annually at [address] [facility], done last fall, report requested. Scheduling for next month. Reviewed medications and side effects, doing well on current regimen  Further follow up & other recommendations pending review of labs.  If all remains good and stable, follow up in 1 yr, sooner prn

## 2021-11-08 NOTE — PATIENT INSTRUCTIONS
Medicare Wellness Visit, Female     The best way to live healthy is to have a lifestyle where you eat a well-balanced diet, exercise regularly, limit alcohol use, and quit all forms of tobacco/nicotine, if applicable. Regular preventive services are another way to keep healthy. Preventive services (vaccines, screening tests, monitoring & exams) can help personalize your care plan, which helps you manage your own care. Screening tests can find health problems at the earliest stages, when they are easiest to treat. [facility] follows the current, evidence-based guidelines published by the [facility] ([facility]) when recommending preventive services for our patients. Because we follow these guidelines, sometimes recommendations change over time as research supports it. (For example, mammograms used to be recommended annually. Even though Medicare will still pay for an annual mammogram, the newer guidelines recommend a mammogram every two years for women of average risk). Of course, you and your doctor may decide to screen more often for some diseases, based on your risk and your co-morbidities (chronic disease you are already diagnosed with). Preventive services for you include:  - Medicare offers their members a free annual wellness visit, which is time for you and your primary care provider to discuss and plan for your preventive service needs. Take advantage of this benefit every year!  -All adults over the age of 72 should receive the recommended pneumonia vaccines. Current USPSTF guidelines recommend a series of two vaccines for the best pneumonia protection.   -All adults should have a flu vaccine yearly and a tetanus vaccine every 10 years.   -All adults age 48 and older should receive the shingles vaccines (series of two vaccines).       -All adults age 38-68 who are overweight should have a diabetes screening test once every three years.   -All adults born between 80 and 1965 should be screened once for Hepatitis C.  -Other screening tests and preventive services for persons with diabetes include: an eye exam to screen for diabetic retinopathy, a kidney function test, a foot exam, and stricter control over your cholesterol.   -Cardiovascular screening for adults with routine risk involves an electrocardiogram (ECG) at intervals determined by your doctor.   -Colorectal cancer screenings should be done for adults age 54-65 with no increased risk factors for colorectal cancer. There are a number of acceptable methods of screening for this type of cancer. Each test has its own benefits and drawbacks. Discuss with your doctor what is most appropriate for you during your annual wellness visit. The different tests include: colonoscopy (considered the best screening method), a fecal occult blood test, a fecal DNA test, and sigmoidoscopy.    -A bone mass density test is recommended when a woman turns 65 to screen for osteoporosis. This test is only recommended one time, as a screening. Some providers will use this same test as a disease monitoring tool if you already have osteoporosis. -Breast cancer screenings are recommended every other year for women of normal risk, age 54-69.  -Cervical cancer screenings for women over age 72 are only recommended with certain risk factors.      Here is a list of your current Health Maintenance items (your personalized list of preventive services) with a due date:  Health Maintenance Due   Topic Date Due    Mammogram  07/23/2019    Colorectal Screening  06/08/2020    Cholesterol Test   09/29/2021    COVID-19 Vaccine (3 - Pfizer booster) 09/30/2021

## 2021-12-28 ENCOUNTER — APPOINTMENT (OUTPATIENT)
Dept: FAMILY MEDICINE CLINIC | Age: 71
End: 2021-12-28

## 2021-12-28 DIAGNOSIS — E78.00 HYPERCHOLESTEROLEMIA: ICD-10-CM

## 2021-12-28 DIAGNOSIS — E55.9 VITAMIN D DEFICIENCY: ICD-10-CM

## 2021-12-28 DIAGNOSIS — R94.6 BORDERLINE ABNORMAL THYROID FUNCTION TEST: ICD-10-CM

## 2021-12-28 LAB
25(OH)D3 SERPL-MCNC: 33.8 NG/ML (ref 30–100)
ALBUMIN SERPL-MCNC: 4.2 G/DL (ref 3.5–5)
ALBUMIN/GLOB SERPL: 1.5 {RATIO} (ref 1.1–2.2)
ALP SERPL-CCNC: 66 U/L (ref 45–117)
ALT SERPL-CCNC: 19 U/L (ref 12–78)
ANION GAP SERPL CALC-SCNC: 4 MMOL/L (ref 5–15)
AST SERPL-CCNC: 14 U/L (ref 15–37)
BILIRUB SERPL-MCNC: 0.5 MG/DL (ref 0.2–1)
BUN SERPL-MCNC: 17 MG/DL (ref 6–20)
BUN/CREAT SERPL: 22 (ref 12–20)
CALCIUM SERPL-MCNC: 9 MG/DL (ref 8.5–10.1)
CHLORIDE SERPL-SCNC: 106 MMOL/L (ref 97–108)
CHOLEST SERPL-MCNC: 199 MG/DL
CO2 SERPL-SCNC: 29 MMOL/L (ref 21–32)
CREAT SERPL-MCNC: 0.79 MG/DL (ref 0.55–1.02)
ERYTHROCYTE [DISTWIDTH] IN BLOOD BY AUTOMATED COUNT: 12.5 % (ref 11.5–14.5)
GLOBULIN SER CALC-MCNC: 2.8 G/DL (ref 2–4)
GLUCOSE SERPL-MCNC: 80 MG/DL (ref 65–100)
HCT VFR BLD AUTO: 43.1 % (ref 35–47)
HDLC SERPL-MCNC: 66 MG/DL
HDLC SERPL: 3 {RATIO} (ref 0–5)
HGB BLD-MCNC: 13.1 G/DL (ref 11.5–16)
LDLC SERPL CALC-MCNC: 120.2 MG/DL (ref 0–100)
MCH RBC QN AUTO: 26.7 PG (ref 26–34)
MCHC RBC AUTO-ENTMCNC: 30.4 G/DL (ref 30–36.5)
MCV RBC AUTO: 87.8 FL (ref 80–99)
NRBC # BLD: 0 K/UL (ref 0–0.01)
NRBC BLD-RTO: 0 PER 100 WBC
PLATELET # BLD AUTO: 247 K/UL (ref 150–400)
PMV BLD AUTO: 11.4 FL (ref 8.9–12.9)
POTASSIUM SERPL-SCNC: 4.7 MMOL/L (ref 3.5–5.1)
PROT SERPL-MCNC: 7 G/DL (ref 6.4–8.2)
RBC # BLD AUTO: 4.91 M/UL (ref 3.8–5.2)
SODIUM SERPL-SCNC: 139 MMOL/L (ref 136–145)
T4 FREE SERPL-MCNC: 0.9 NG/DL (ref 0.8–1.5)
TRIGL SERPL-MCNC: 64 MG/DL (ref ?–150)
TSH SERPL DL<=0.05 MIU/L-ACNC: 2.67 UIU/ML (ref 0.36–3.74)
VLDLC SERPL CALC-MCNC: 12.8 MG/DL
WBC # BLD AUTO: 4.4 K/UL (ref 3.6–11)

## 2022-01-17 DIAGNOSIS — E78.00 HYPERCHOLESTEROLEMIA: ICD-10-CM

## 2022-01-17 RX ORDER — ATORVASTATIN CALCIUM 20 MG/1
TABLET, FILM COATED ORAL
Qty: 90 TABLET | Refills: 3 | Status: SHIPPED | OUTPATIENT
Start: 2022-01-17

## 2022-03-18 PROBLEM — Z71.89 ACP (ADVANCE CARE PLANNING): Status: ACTIVE | Noted: 2019-08-29

## 2022-03-19 PROBLEM — G43.109 OCULAR MIGRAINE: Status: ACTIVE | Noted: 2019-08-29

## 2022-03-19 PROBLEM — E78.00 HYPERCHOLESTEROLEMIA: Status: ACTIVE | Noted: 2019-08-29

## 2022-03-28 NOTE — PROGRESS NOTES
Physical Therapy at [facility],   a part of [address] [place] [address]  [address]  [place], [address] [facility]  Phone: [phone] Fax: [phone]      Discharge Summary 2-15      Patient name: Indigo Morin  : 1950  Provider#: [id number]  Referral source: Dago Dan,*      Medical/Treatment Diagnosis: Right shoulder pain [M25.511]     Prior Hospitalization: see medical history     Comorbidities: see chart  Prior Level of Function:see chart  Medications: Verified on Patient Summary List    Start of Care: 22      Onset Date: 2020   Visits from Start of Care: 11     Missed Visits: 0  Reporting Period : 21 to 10/12/21    ASSESSMENT/Changes in Function:   Pt has made very good progress overall and feels ready to be d/c'd. Pt has been compliant w/ HEP and understands the expectations w/ her diagnosis. Pt will be d/c'd from PT at this time.     Objective Measures:   Posture: normal  Palpation: no tenderness   R shoulder AROM: abd 170 deg, ER to T3, IR to T12 R, T6 L  R shoulder PROM: flexion 170 deg, ER 80 deg, IR 75 deg     R shoulder strength: ER 5/5, IR 5/5, abd 5/5, flex 4/5       RECOMMENDATIONS:  [x]Discontinue therapy: [x]Patient has reached or is progressing toward set goals     []Patient is non-compliant or has abdicated     []Due to lack of appreciable progress towards set goals    Jose Walker DPT

## 2022-07-26 ENCOUNTER — TELEPHONE (OUTPATIENT)
Dept: FAMILY MEDICINE CLINIC | Age: 72
End: 2022-07-26

## 2022-07-26 NOTE — TELEPHONE ENCOUNTER
Patient called has some question about the  2 booster  Would like for the nurse to call her.     Requesting a call back    Best call back #[phone]

## 2022-07-27 NOTE — TELEPHONE ENCOUNTER
Pt states that she had both covid injections and one booster. The booster was 11/26/21. Pt wanted to know if she should get the 2nd booster or wait for the new one coming out. Advised that the CDC isn't clear on that right now and that we don't know what is the best thing to do. Let her know that Dr Bailey Pierre is looking on the [employer] website everyday for guidance as to what to tell the patients. I recommended that she check out the [employer] website as well to see what they are recommending. Pt agrees with plan.

## 2022-10-25 ENCOUNTER — OFFICE VISIT (OUTPATIENT)
Dept: FAMILY MEDICINE CLINIC | Age: 72
End: 2022-10-25
Payer: MEDICARE

## 2022-10-25 VITALS
HEART RATE: 81 BPM | SYSTOLIC BLOOD PRESSURE: 132 MMHG | DIASTOLIC BLOOD PRESSURE: 74 MMHG | TEMPERATURE: 98.6 F | BODY MASS INDEX: 24.95 KG/M2 | WEIGHT: 164.6 LBS | RESPIRATION RATE: 16 BRPM | OXYGEN SATURATION: 98 % | HEIGHT: 68 IN

## 2022-10-25 DIAGNOSIS — Z23 ENCOUNTER FOR IMMUNIZATION: ICD-10-CM

## 2022-10-25 DIAGNOSIS — M19.042 PRIMARY OSTEOARTHRITIS OF BOTH HANDS: ICD-10-CM

## 2022-10-25 DIAGNOSIS — R94.6 BORDERLINE ABNORMAL THYROID FUNCTION TEST: ICD-10-CM

## 2022-10-25 DIAGNOSIS — R20.9 BILATERAL COLD FEET: ICD-10-CM

## 2022-10-25 DIAGNOSIS — R20.2 PARESTHESIA OF BOTH FEET: ICD-10-CM

## 2022-10-25 DIAGNOSIS — Z23 NEEDS FLU SHOT: ICD-10-CM

## 2022-10-25 DIAGNOSIS — M19.041 PRIMARY OSTEOARTHRITIS OF BOTH HANDS: ICD-10-CM

## 2022-10-25 DIAGNOSIS — M25.842 CYST OF JOINT OF LEFT HAND: ICD-10-CM

## 2022-10-25 DIAGNOSIS — E78.00 HYPERCHOLESTEROLEMIA: Primary | ICD-10-CM

## 2022-10-25 PROCEDURE — G8417 CALC BMI ABV UP PARAM F/U: HCPCS | Performed by: FAMILY MEDICINE

## 2022-10-25 PROCEDURE — G8399 PT W/DXA RESULTS DOCUMENT: HCPCS | Performed by: FAMILY MEDICINE

## 2022-10-25 PROCEDURE — 90694 VACC AIIV4 NO PRSRV 0.5ML IM: CPT | Performed by: FAMILY MEDICINE

## 2022-10-25 PROCEDURE — G9899 SCRN MAM PERF RSLTS DOC: HCPCS | Performed by: FAMILY MEDICINE

## 2022-10-25 PROCEDURE — 99215 OFFICE O/P EST HI 40 MIN: CPT | Performed by: FAMILY MEDICINE

## 2022-10-25 PROCEDURE — 1090F PRES/ABSN URINE INCON ASSESS: CPT | Performed by: FAMILY MEDICINE

## 2022-10-25 PROCEDURE — G8427 DOCREV CUR MEDS BY ELIG CLIN: HCPCS | Performed by: FAMILY MEDICINE

## 2022-10-25 PROCEDURE — 1123F ACP DISCUSS/DSCN MKR DOCD: CPT | Performed by: FAMILY MEDICINE

## 2022-10-25 PROCEDURE — G8536 NO DOC ELDER MAL SCRN: HCPCS | Performed by: FAMILY MEDICINE

## 2022-10-25 PROCEDURE — G9717 DOC PT DX DEP/BP F/U NT REQ: HCPCS | Performed by: FAMILY MEDICINE

## 2022-10-25 PROCEDURE — G0008 ADMIN INFLUENZA VIRUS VAC: HCPCS | Performed by: FAMILY MEDICINE

## 2022-10-25 PROCEDURE — 3017F COLORECTAL CA SCREEN DOC REV: CPT | Performed by: FAMILY MEDICINE

## 2022-10-25 PROCEDURE — 1101F PT FALLS ASSESS-DOCD LE1/YR: CPT | Performed by: FAMILY MEDICINE

## 2022-10-25 NOTE — PROGRESS NOTES
Chief Complaint   Patient presents with    Cholesterol Problem     fasting     1. \"Have you been to the ER, urgent care clinic since your last visit? Hospitalized since your last visit? \" No    2. \"Have you seen or consulted any other health care providers outside of the [address] [place] [address] since your last visit? \" Emily Macdonald    3. For patients aged 39-70: Has the patient had a colonoscopy / FIT/ Cologuard? Yes - no Care Gap present 9/2020 Polyp, Repeat 5 yrs, Dr. Lorraine Michel      If the patient is female:    4. For patients aged 41-77: Has the patient had a mammogram within the past 2 years? Yes - no Care Gap present 12/2021      5. For patients aged 21-65: Has the patient had a pap smear?  Yes - no Care Gap present gyn Dr hinson

## 2022-10-26 LAB
ALBUMIN SERPL-MCNC: 4.3 G/DL (ref 3.5–5)
ALBUMIN/GLOB SERPL: 1.4 {RATIO} (ref 1.1–2.2)
ALP SERPL-CCNC: 58 U/L (ref 45–117)
ALT SERPL-CCNC: 20 U/L (ref 12–78)
ANION GAP SERPL CALC-SCNC: 7 MMOL/L (ref 5–15)
AST SERPL-CCNC: 16 U/L (ref 15–37)
BILIRUB SERPL-MCNC: 0.6 MG/DL (ref 0.2–1)
BUN SERPL-MCNC: 13 MG/DL (ref 6–20)
BUN/CREAT SERPL: 15 (ref 12–20)
CALCIUM SERPL-MCNC: 9.3 MG/DL (ref 8.5–10.1)
CHLORIDE SERPL-SCNC: 106 MMOL/L (ref 97–108)
CHOLEST SERPL-MCNC: 210 MG/DL
CO2 SERPL-SCNC: 27 MMOL/L (ref 21–32)
CREAT SERPL-MCNC: 0.84 MG/DL (ref 0.55–1.02)
ERYTHROCYTE [DISTWIDTH] IN BLOOD BY AUTOMATED COUNT: 12.6 % (ref 11.5–14.5)
EST. AVERAGE GLUCOSE BLD GHB EST-MCNC: 123 MG/DL
FOLATE SERPL-MCNC: 19.8 NG/ML (ref 5–21)
GLOBULIN SER CALC-MCNC: 3 G/DL (ref 2–4)
GLUCOSE SERPL-MCNC: 82 MG/DL (ref 65–100)
HBA1C MFR BLD: 5.9 % (ref 4–5.6)
HCT VFR BLD AUTO: 44.9 % (ref 35–47)
HDLC SERPL-MCNC: 73 MG/DL
HDLC SERPL: 2.9 {RATIO} (ref 0–5)
HGB BLD-MCNC: 13.5 G/DL (ref 11.5–16)
LDLC SERPL CALC-MCNC: 123.8 MG/DL (ref 0–100)
MCH RBC QN AUTO: 26.9 PG (ref 26–34)
MCHC RBC AUTO-ENTMCNC: 30.1 G/DL (ref 30–36.5)
MCV RBC AUTO: 89.6 FL (ref 80–99)
NRBC # BLD: 0 K/UL (ref 0–0.01)
NRBC BLD-RTO: 0 PER 100 WBC
PLATELET # BLD AUTO: 264 K/UL (ref 150–400)
PMV BLD AUTO: 10.9 FL (ref 8.9–12.9)
POTASSIUM SERPL-SCNC: 4.3 MMOL/L (ref 3.5–5.1)
PROT SERPL-MCNC: 7.3 G/DL (ref 6.4–8.2)
RBC # BLD AUTO: 5.01 M/UL (ref 3.8–5.2)
SODIUM SERPL-SCNC: 140 MMOL/L (ref 136–145)
T4 FREE SERPL-MCNC: 0.9 NG/DL (ref 0.8–1.5)
TRIGL SERPL-MCNC: 66 MG/DL (ref ?–150)
TSH SERPL DL<=0.05 MIU/L-ACNC: 2.73 UIU/ML (ref 0.36–3.74)
VIT B12 SERPL-MCNC: 364 PG/ML (ref 193–986)
VLDLC SERPL CALC-MCNC: 13.2 MG/DL
WBC # BLD AUTO: 3.9 K/UL (ref 3.6–11)

## 2022-10-26 NOTE — PROGRESS NOTES
Chief Complaint   Patient presents with    Cholesterol Problem     Fasting    Joint Pain     Fingers on both hands, nodule left thumb    Tingling     Both feet x 3 weeks       HISTORY OF PRESENT ILLNESS   Fasting follow up hypercholesterolemia  Continues on Lipitor 20 mg daily w/o reaction or side effects  Diet: nothing special just tries to follow sensible diet overall   Caffeine: 20 oz decaff coffee a day, tea 2 x a week, seldom sodas  Exercise: walks 2-3 x a week x 30 minutes and tries to get in 10K steps a day 3 x a week, stays active in her yard  Weight: stable in the 160's over the years    Hand pain/stiffness  Patient is right handed and reports ~ 1 yr h/o stiffness in DIP joints of all fingers on both hands. Seems to have worsened some in recent months. There has been a cyst like knot on the left thumb that has also gotten bigger. It at times gets inflamed, red, swollen and irritated. The other joints flare slightly from time to time but not as significant or bothersome. She rates these symptoms as mild and nothing that she has needed to take medication for but at times can be more uncomfortable than others. Reports family history OA but denies family h/o RA or autoimmune d/o. She denies other joint pain except occ pain, stiffness in right knee. But no redness, swelling, heat or giving way. She ambulates w/o difficulty. Tingling in feet  Patient states that she has always had rather cold feet x many years but the past 3-4 weeks the coldness seems to be more pronounced along w/ new onset of tingling sensation in the bottoms of her feet on both sides primarily in the forefoot and bases of all her toes. seems to be worse when sitting/resting/sedentary. Seems less noticeable when she is active or up and moving around. She denies swelling. She denies any discoloration of the skin of her feet or toes. No redness, paleness, bluish discoloration. Denies digital pain. Denies numbness.    Denies leg pain or cramps. No back pain or leg claudication. No lower extremity weakness. No hand paresthesias. No blurred vision. No urinary frequency. No polydipsia. REVIEW OF SYMPTOMS   Review of Systems   Constitutional: Negative. Negative for chills, diaphoresis, fever, malaise/fatigue and weight loss. Eyes: Negative. Respiratory: Negative. Cardiovascular: Negative. Gastrointestinal: Negative. Genitourinary: Negative. Musculoskeletal:  Positive for joint pain. Neurological:  Positive for tingling. Negative for dizziness, focal weakness and headaches. Endo/Heme/Allergies: Negative.           PROBLEM LIST/MEDICAL HISTORY     Problem List  Date Reviewed: 10/25/2022            Codes Class Noted    Hypercholesterolemia ICD-10-CM: E78.00  ICD-9-CM: 272.0  8/29/2019        Ocular migraine ICD-10-CM: G43.109  ICD-9-CM: 346.80  8/29/2019    Overview Signed 8/29/2019 11:56 AM by Karen Handy MD     Diagnosed by Ophthalmologist  [phone]             ACP (advance care planning) ICD-10-CM: Z71.89  ICD-9-CM: V65.49  8/29/2019    Overview Signed 8/29/2019  1:44 PM by Karen Handy MD     ACP discussion [phone], AMD form given             Borderline abnormal thyroid function test ICD-10-CM: R94.6  ICD-9-CM: 794.5  1/7/2014    Overview Signed 1/7/2014  9:54 PM by Karen Handy MD     1/2014             Vitamin D deficiency ICD-10-CM: E55.9  ICD-9-CM: 268.9  1/7/2014        Perennial allergic rhinitis ICD-10-CM: J30.89  ICD-9-CM: 477.8  9/14/2011    Overview Addendum 9/14/2011  1:34 PM by Karen Handy MD     2011 Allergist Dr Smith; dust, trees, mold, fall time             Seasonal asthma ICD-10-CM: J45.998  ICD-9-CM: 493.90  9/14/2011    Overview Signed 9/14/2011  9:28 AM by Karen Handy MD     PFT's allergist office July 2011, Dr Smith             History of iron deficiency anemia ICD-10-CM: Z86.2  ICD-9-CM: V12.3  9/14/2011        Seborrheic keratosis ICD-10-CM: L82.1  ICD-9-CM: 702.19  2011    Overview Signed 2011  9:29 AM by Primo Tolliver MD     [facility]             Depression with anxiety ICD-10-CM: F41.8  ICD-9-CM: 300.4  2011    Overview Signed 2011  1:32 PM by Primo Tolliver MD     [id number]             History of breast cancer/DCIS, s/p left mastectomy  and 5yr of Tamoxifen ICD-10-CM: Z85.3  ICD-9-CM: V10.3  2011    Overview Addendum 2011  1:34 PM by Primo Tolliver MD     DCIS; Onc Dr Renu Miller             S/P GAUDENCIO (total abdominal hysterectomy) w/ LSO for Uterine Fibroids  ICD-10-CM: Z90.710  ICD-9-CM: V88.01  2011        S/P colonoscopic polypectomy, benign  ICD-10-CM: Z98.890  ICD-9-CM: V45.89  2011    Overview Signed 2011  1:35 PM by Drexel Cong, MD Dr Marvina Holter             Microscopic hematuria, benign ICD-10-CM: R31.29  ICD-9-CM: 599.72  2011    Overview Addendum 10/17/2014 10:51 AM by Primo Tolliver MD     Urology, Dr Leatha Cabrera, ;  FISH test negative and h/o cystoscopy as well             Osteoarthritis of right hip ICD-10-CM: M16.10  ICD-9-CM: 716.95  2011    Overview Signed 2011  1:39 PM by Primo Tolliver MD     Xrays 2005             IBS (irritable bowel syndrome) (Chronic) ICD-10-CM: K58.9  ICD-9-CM: 564.1  3/18/2010               PAST SURGICAL HISTORY     Past Surgical History:   Procedure Laterality Date    HAND/FINGER SURGERY UNLISTED      dislocated thumb surgery repair    HX  SECTION  ,     HX COLONOSCOPY  2015    Repeat 5 yrs/Luis Cruz    HX COLONOSCOPY  2020    Polyp, Repeat 5 yrs, Dr. Niharika Gaytan  2004    GAUDENCIO-LSO, fibroids    HX MASTECTOMY  2004    left    HX MASTECTOMY  2004    left breast DCIS, total mastectomy and saline implant    HX TUBAL LIGATION      [place] COLONOSCOPY W/BIOPSY SINGLE/MULTIPLE      benign polyp; f/u 5 yrs, Dr Lazarus Streeter [place] COLONOSCOPY W/BIOPSY SINGLE/MULTIPLE  1/2005    benign polyp; f/u 5 yrs, Dr Blanka Stanton THYROID/PARATHYROID/SOFT TISS  2007    normal         MEDICATIONS     Current Outpatient Medications   Medication Sig    atorvastatin (LIPITOR) 20 mg tablet TAKE 1 TABLET DAILY (APPOINTMENT/LABS DUE)    albuterol (PROVENTIL HFA, VENTOLIN HFA, PROAIR HFA) 90 mcg/actuation inhaler Take 1 Puff by inhalation every six (6) hours as needed for Wheezing.  Cetirizine 10 mg cap Take  by mouth daily.  DOCOSAHEXANOIC ACID/EPA (FISH OIL PO) Take 1 Cap by mouth daily. Takes ~ 3 times a week    cholecalciferol (VITAMIN D3) 25 mcg (1,000 unit) cap Take 1,000 Units by mouth daily.  beclomethasone (QVAR) 80 mcg/actuation aero Take 1 Puff by inhalation two (2) times daily as needed. No current facility-administered medications for this visit.           ALLERGIES     Allergies   Allergen Reactions    Pravachol [Pravastatin] Myalgia     40 mg    Zocor [Simvastatin] Myalgia     Tolerated 10 mg fine, but aching on 20 mg          SOCIAL HISTORY     Social History     Tobacco Use    Smoking status: Never    Smokeless tobacco: Never   Substance Use Topics    Alcohol use: Yes     Comment: occ wine or beer     Social History     Social History Narrative        2 daughters    Telephone  for [phone]    Diet: nothing special just tries to follow sensible diet overall     Caffeine: 20 oz decaff coffee a day, tea 2 x a week, seldom sodas    Exercise: walks 2-3 x a week x 30 minutes and tries to get in 10K steps a day 3 x a week, stays active in her yard    Weight: stable in the 160's over the years         Social History     Substance and Sexual Activity   Sexual Activity Yes    Partners: Male       IMMUNIZATIONS     Immunization History   Administered Date(s) Administered    COVID-19, PFIZER PURPLE top, DILUTE for use, (age 15 y+), IM, 30mcg/0.3mL 02/27/2021, 03/30/2021, 11/26/2021, 07/31/2022    Influenza Vaccine 10/01/2013    Influenza Vaccine (Tri) Adjuvanted (>65 Yrs FLUAD TRI 47733) 2020    Influenza, FLUAD, (age 72 y+), Adjuvanted 2021, 10/25/2022    Pneumococcal Conjugate (PCV-13) 2017    Pneumococcal Polysaccharide (PPSV-23) 2016         FAMILY HISTORY     Family History   Problem Relation Age of Onset    Breast Cancer Mother         dx in her 52's,  age 58    Cancer Mother         uterine cancer in her 52's, treated    Hypertension Father     Stroke Father          age 62    Seizures Father     Alcohol abuse Brother          age 36 from complications    OSTEOARTHRITIS Maternal Grandmother [de-identified]    Stroke Maternal Grandmother          in her [de-identified]    Cancer Maternal Grandfather         ?stomach cancer    Heart Disease Maternal Grandfather          in his 66's    Seizures Paternal Aunt     Breast Cancer Paternal Aunt         in her 66's    Breast Cancer Cousin         maternal 1st cousin    Arthritis-rheumatoid Neg Hx          VITALS   Visit Vitals  /74 (BP 1 Location: Left upper arm, BP Patient Position: Sitting, BP Cuff Size: Large adult)   Pulse 81   Temp 98.6 °F (37 °C) (Oral)   Resp 16   Ht 5' 7.75\" (1.721 m)   Wt 164 lb 9.6 oz (74.7 kg)   SpO2 98%   BMI 25.21 kg/m²          PHYSICAL EXAMINATION   Physical Exam  Vitals reviewed. Constitutional:       Appearance: Normal appearance. Neck:      Thyroid: No thyroid mass, thyromegaly or thyroid tenderness. Vascular: No carotid bruit. Cardiovascular:      Rate and Rhythm: Normal rate and regular rhythm. Pulses: Normal pulses. Dorsalis pedis pulses are 2+ on the right side and 2+ on the left side. Posterior tibial pulses are 2+ on the left side. Heart sounds: Normal heart sounds. No murmur heard. No gallop. Pulmonary:      Effort: Pulmonary effort is normal.      Breath sounds: Normal breath sounds. Abdominal:      General: There is no distension.       Palpations: Abdomen is soft. Tenderness: There is no abdominal tenderness. Musculoskeletal:         General: No swelling or tenderness. Normal range of motion. Cervical back: Neck supple. Lumbar back: Normal range of motion. Right knee: Normal range of motion. Left knee: Normal range of motion. Right lower leg: Normal. No edema. Left lower leg: Normal.      Comments: Mild nodularity and curvature at DIP joints of some fingers, mostly 5th. On the radial aspect of left thumb there is an ~ 3 x 3 mm raised cystic nodule at the DIP joint. ROM intact. No joint effusions, erythema. Normal color, temperature and turgor of hands and feet B. Feet:      Right foot:      Protective Sensation: 5 sites tested. 5 sites sensed. Skin integrity: Skin integrity normal. No skin breakdown or erythema. Toenail Condition: Right toenails are normal.      Left foot:      Protective Sensation: 5 sites tested. 5 sites sensed. Skin integrity: Skin integrity normal. No skin breakdown or erythema. Toenail Condition: Left toenails are normal.   Lymphadenopathy:      Cervical: No cervical adenopathy. Skin:     General: Skin is warm and dry. Capillary Refill: Capillary refill takes less than 2 seconds. Coloration: Skin is not pale. Findings: No erythema. Neurological:      General: No focal deficit present. Mental Status: She is alert and oriented to person, place, and time. Cranial Nerves: No cranial nerve deficit. Sensory: Sensation is intact. Motor: Motor function is intact. No weakness. Coordination: Coordination is intact. Gait: Gait is intact. Gait normal.      Deep Tendon Reflexes: Reflexes normal.               ASSESSMENT & PLAN   Diagnoses and all orders for this visit:    1.  Hypercholesterolemia  LDL improved on statin therapy but not ideal goal  Check fasting labs today and continue Lipitor 20 mg daily for now until review of today's labs  -     LIPID PANEL; Future    2. Borderline abnormal thyroid function test  -     TSH 3RD GENERATION; Future  -     T4, FREE; Future    3. Paresthesia of both feet  -     CBC W/O DIFF; Future  -     METABOLIC PANEL, COMPREHENSIVE; Future  -     HEMOGLOBIN A1C WITH EAG; Future  -     VITAMIN B12 & FOLATE; Future  -     EMG TWO EXTREMITIES LOWER; Future  -     DUPLEX LOWER EXT ARTERY BILAT; Future    4. Bilateral cold feet  -     CBC W/O DIFF; Future  -     TSH 3RD GENERATION; Future  -     EMG TWO EXTREMITIES LOWER; Future  -     DUPLEX LOWER EXT ARTERY BILAT; Future    5. Primary osteoarthritis of both hands  Aleve bid w/ food prn or Voltaren Gel qid prn  Other suggestions to try: Glucosamine/Chondroitin, increasing fish oil supplement to daily usage  Hand arthritis exercises, handouts given   -     REFERRAL TO ORTHOPEDICS    6. Cyst of joint of left thumb  -     REFERRAL TO ORTHOPEDICS    7. Encounter for immunization  -     [place] IMMUNIZ ADMIN,1 SINGLE/COMB VAC/TOXOID  -     INFLUENZA, FLUAD, (AGE 65 Y+), IM, PF, 0.5 ML    8. Needs flu shot  -     [place] IMMUNIZ ADMIN,1 SINGLE/COMB VAC/TOXOID  -     INFLUENZA, FLUAD, (AGE 65 Y+), IM, PF, 0.5 ML    Fasting labs checked today  Cardiovascular risk and specific lipid/LDL goals reviewed  Reviewed diet, nutrition, exercise, weight management, BMI/goals. Age/risk based screening recommendations, health maintenance & prevention counseling. Cancer screening USPTFS guidelines reviewed w/ pt today. Discussed benefits/positive/negative outcomes of screening based on age/risk stratification. Informed consent for/against screening based on pt's personal hx/risk factors. Updated in history above and health maintenance. Reviewed medications and side effects   Further follow up & other recommendations pending review of labs, doppler, emg.      Total Encounter: 40\"

## 2022-10-29 ENCOUNTER — TELEPHONE (OUTPATIENT)
Dept: FAMILY MEDICINE CLINIC | Age: 72
End: 2022-10-29

## 2022-10-29 NOTE — TELEPHONE ENCOUNTER
Advise pt her blood counts, liver, kidney, electrolytes, thyroid, b12, folate levels are all normal.     Her LDL (\"bad cholesterol\") is mildly elevated and remains about the same over the past 3 years but up some from years prior to that. Her HDL (\"good cholesterol\") remains very good and at goal range. Is she taking the Lipitor daily on a routine basis? Her HgbA1c (3 month sugar average) is elevated in the prediabetes range. Follow a sugar free, low carb diet, and try to increase her exercise to at least 30 minutes 4-5 x a week instead now. The prediabetes may or may not be contributing to some of her symptoms, but I dont feel it is to a significant enough degree to be the case however. So I recommend she go ahead and proceed w/ getting the EMG and doppler tests done. Those orders are already in place. So tell her to go ahead and schedule those when they call her to set them up. She is due her Medicare AWV in November which we can do as a VV 20 minute appt if available. Otherwise we will be back in touch w/ the results of the other tests when they are done. Let me know about the Lipitor.

## 2022-12-07 ENCOUNTER — OFFICE VISIT (OUTPATIENT)
Dept: FAMILY MEDICINE CLINIC | Age: 72
End: 2022-12-07
Payer: MEDICARE

## 2022-12-07 VITALS
SYSTOLIC BLOOD PRESSURE: 128 MMHG | WEIGHT: 163.6 LBS | HEIGHT: 68 IN | TEMPERATURE: 98.4 F | BODY MASS INDEX: 24.8 KG/M2 | OXYGEN SATURATION: 99 % | DIASTOLIC BLOOD PRESSURE: 70 MMHG | HEART RATE: 100 BPM | RESPIRATION RATE: 16 BRPM

## 2022-12-07 DIAGNOSIS — R73.03 PREDIABETES: ICD-10-CM

## 2022-12-07 DIAGNOSIS — Z00.00 MEDICARE ANNUAL WELLNESS VISIT, SUBSEQUENT: Primary | ICD-10-CM

## 2022-12-07 DIAGNOSIS — E78.00 HYPERCHOLESTEROLEMIA: ICD-10-CM

## 2022-12-07 PROCEDURE — 1090F PRES/ABSN URINE INCON ASSESS: CPT | Performed by: FAMILY MEDICINE

## 2022-12-07 PROCEDURE — G0439 PPPS, SUBSEQ VISIT: HCPCS | Performed by: FAMILY MEDICINE

## 2022-12-07 PROCEDURE — G9899 SCRN MAM PERF RSLTS DOC: HCPCS | Performed by: FAMILY MEDICINE

## 2022-12-07 PROCEDURE — 99213 OFFICE O/P EST LOW 20 MIN: CPT | Performed by: FAMILY MEDICINE

## 2022-12-07 PROCEDURE — 1123F ACP DISCUSS/DSCN MKR DOCD: CPT | Performed by: FAMILY MEDICINE

## 2022-12-07 PROCEDURE — 3017F COLORECTAL CA SCREEN DOC REV: CPT | Performed by: FAMILY MEDICINE

## 2022-12-07 PROCEDURE — G8399 PT W/DXA RESULTS DOCUMENT: HCPCS | Performed by: FAMILY MEDICINE

## 2022-12-07 PROCEDURE — 1101F PT FALLS ASSESS-DOCD LE1/YR: CPT | Performed by: FAMILY MEDICINE

## 2022-12-07 PROCEDURE — G8427 DOCREV CUR MEDS BY ELIG CLIN: HCPCS | Performed by: FAMILY MEDICINE

## 2022-12-07 PROCEDURE — G8536 NO DOC ELDER MAL SCRN: HCPCS | Performed by: FAMILY MEDICINE

## 2022-12-07 PROCEDURE — G8420 CALC BMI NORM PARAMETERS: HCPCS | Performed by: FAMILY MEDICINE

## 2022-12-07 PROCEDURE — G9717 DOC PT DX DEP/BP F/U NT REQ: HCPCS | Performed by: FAMILY MEDICINE

## 2022-12-07 RX ORDER — ATORVASTATIN CALCIUM 20 MG/1
TABLET, FILM COATED ORAL
Qty: 90 TABLET | Refills: 3 | Status: SHIPPED | OUTPATIENT
Start: 2022-12-07

## 2022-12-07 NOTE — PATIENT INSTRUCTIONS
Medicare Wellness Visit, Female     The best way to live healthy is to have a lifestyle where you eat a well-balanced diet, exercise regularly, limit alcohol use, and quit all forms of tobacco/nicotine, if applicable. Regular preventive services are another way to keep healthy. Preventive services (vaccines, screening tests, monitoring & exams) can help personalize your care plan, which helps you manage your own care. Screening tests can find health problems at the earliest stages, when they are easiest to treat. [facility] follows the current, evidence-based guidelines published by the [facility] G[facility] Quiles ([facility]) when recommending preventive services for our patients. Because we follow these guidelines, sometimes recommendations change over time as research supports it. (For example, mammograms used to be recommended annually. Even though Medicare will still pay for an annual mammogram, the newer guidelines recommend a mammogram every two years for women of average risk). Of course, you and your doctor may decide to screen more often for some diseases, based on your risk and your co-morbidities (chronic disease you are already diagnosed with). Preventive services for you include:  - Medicare offers their members a free annual wellness visit, which is time for you and your primary care provider to discuss and plan for your preventive service needs.  Take advantage of this benefit every year!    -Over the age of 72 should receive the recommended pneumonia vaccines.    -All adults should have a flu vaccine yearly.  -All adults should have a tetanus vaccine every 10 years.   -Over the age 48 should receive the shingles vaccines.        -All adults should be screened once for Hepatitis C.  -All adults age 38-68 who are overweight should have a diabetes screening test once every three years.   -Other screening tests and preventive services for persons with diabetes include: an eye exam to screen for diabetic retinopathy, a kidney function test, a foot exam, and stricter control over your cholesterol.   -Cardiovascular screening for adults with routine risk involves an electrocardiogram (ECG) at intervals determined by your doctor.     -Colorectal cancer screenings should be done for adults age 39-70 with no increased risk factors for colorectal cancer. There are a number of acceptable methods of screening for this type of cancer. Each test has its own benefits and drawbacks. Discuss with your doctor what is most appropriate for you during your annual wellness visit. The different tests include: colonoscopy (considered the best screening method), a fecal occult blood test, a fecal DNA test, and sigmoidoscopy.    -Lung cancer screening is recommended annually with a low dose CT scan for adults between age 54 and 68, who have smoked at least 30 pack years (equivalent of 1 pack per day for 30 days), and who is a current smoker or quit less than 15 years ago.    -A bone mass density test is recommended when a woman turns 65 to screen for osteoporosis. This test is only recommended one time, as a screening. Some providers will use this same test as a disease monitoring tool if you already have osteoporosis. -Breast cancer screenings are recommended every other year for women of normal risk, age 54-69.    -Cervical cancer screenings for women over age 72 are only recommended with certain risk factors.      Here is a list of your current Health Maintenance items (your personalized list of preventive services) with a due date:  Health Maintenance Due   Topic Date Due    Colorectal Screening  06/08/2020    Mammogram  12/22/2022

## 2022-12-07 NOTE — PROGRESS NOTES
Chief Complaint   Patient presents with    Annual Wellness Visit    Cholesterol Problem     Fasting labs done 10-25-22    Pre-diabetes       HISTORY OF PRESENT ILLNESS     Follow up hypercholesterolemia, prediabetes, medication check and review of fasting labs done 10-25-22. Complying routinely w/ medication regimen updated below and tolerating w/o reaction or side effects. Needs Lipitor renewed. Scheduled for EMG 12-13-22 and in process of scheduling arterial dopplers for workup of B foot neuropathy. Diet: nothing special just tries to follow sensible diet overall   Caffeine: 20 oz decaff coffee a day, tea 2 x a week, seldom sodas  Exercise: walks 2-3 x a week x 30 minutes and tries to get in 10K steps a day 3 x a week, stays active in her yard  Weight: stable in the 160's over the years   REVIEW OF SYMPTOMS   Review of Systems   Constitutional: Negative. Eyes: Negative. Respiratory: Negative. Cardiovascular: Negative. Gastrointestinal: Negative. Genitourinary: Negative. Musculoskeletal:  Negative for myalgias. Neurological:  Negative for dizziness. Endo/Heme/Allergies: Negative. Psychiatric/Behavioral: Negative.            PROBLEM LIST/MEDICAL HISTORY     Problem List  Date Reviewed: 12/7/2022            Codes Class Noted    Prediabetes ICD-10-CM: R73.03  ICD-9-CM: 790.29  12/7/2022    Overview Signed 12/7/2022 12:44 PM by Aguilar Patterson MD                  Hypercholesterolemia ICD-10-CM: E78.00  ICD-9-CM: 272.0  8/29/2019        Ocular migraine ICD-10-CM: X35.052  ICD-9-CM: 346.80  8/29/2019    Overview Signed 8/29/2019 11:56 AM by Aguilar Patterson MD     Diagnosed by Ophthalmologist  [phone]             ACP (advance care planning) ICD-10-CM: Z71.89  ICD-9-CM: V65.49  8/29/2019    Overview Signed 8/29/2019  1:44 PM by Aguilar Patterson MD     ACP discussion [phone], AMD form given             Borderline abnormal thyroid function test ICD-10-CM: R94.6  ICD-9-CM: 794.5  1/7/2014    Overview Signed 1/7/2014  9:54 PM by Lynnette Carvalho MD     1/2014             Vitamin D deficiency ICD-10-CM: E55.9  ICD-9-CM: 268.9  1/7/2014        Perennial allergic rhinitis ICD-10-CM: J30.89  ICD-9-CM: 477.8  9/14/2011    Overview Addendum 9/14/2011  1:34 PM by Lynnette Carvalho MD     2011 Allergist Dr Smith; dust, trees, mold, fall time             Seasonal asthma ICD-10-CM: J45.998  ICD-9-CM: 493.90  9/14/2011    Overview Signed 9/14/2011  9:28 AM by Lynnette Carvalho MD     PFT's allergist office July 2011, Dr Smith             History of iron deficiency anemia ICD-10-CM: Z86.2  ICD-9-CM: V12.3  9/14/2011        Seborrheic keratosis ICD-10-CM: L82.1  ICD-9-CM: 702.19  9/14/2011    Overview Signed 9/14/2011  9:29 AM by Lynnette Carvalho MD     Dermatology, Dr Melissa Avery             Depression with anxiety ICD-10-CM: F41.8  ICD-9-CM: 300.4  9/14/2011    Overview Signed 9/14/2011  1:32 PM by Lynnette Carvalho MD     [phone]             History of breast cancer/DCIS, s/p left mastectomy 2004 and 5yr of Tamoxifen ICD-10-CM: Z85.3  ICD-9-CM: V10.3  9/14/2011    Overview Addendum 9/14/2011  1:34 PM by Lynnette Carvalho MD     DCIS; Onc Dr Latricia Senior             S/P GAUDENCIO (total abdominal hysterectomy) w/ LSO for Uterine Fibroids 2004 ICD-10-CM: Z90.710  ICD-9-CM: V88.01  9/14/2011        S/P colonoscopic polypectomy, benign 2005 ICD-10-CM: Z98.890  ICD-9-CM: V45.89  9/14/2011    Overview Signed 9/14/2011  1:35 PM by Lynnette Maia, MD     Dr Radha Clarke             Microscopic hematuria, benign ICD-10-CM: R31.29  ICD-9-CM: 599.72  9/14/2011    Overview Addendum 10/17/2014 10:51 AM by Lynnette Carvalho MD     Urology, Dr Felisha Gibson, 2008;  FISH test negative and h/o cystoscopy as well             Osteoarthritis of right hip ICD-10-CM: M16.10  ICD-9-CM: 716.95  9/14/2011    Overview Signed 9/14/2011  1:39 PM by Lynnette Carvalho MD     Xrays 2005             IBS (irritable bowel syndrome) (Chronic) ICD-10-CM: K58.9  ICD-9-CM: 564.1  3/18/2010               PAST SURGICAL HISTORY     Past Surgical History:   Procedure Laterality Date    HAND/FINGER SURGERY UNLISTED      dislocated thumb surgery repair    HX  SECTION  ,     HX COLONOSCOPY  2015    Repeat 5 yrs/Luis Cruz    HX COLONOSCOPY  2020    Polyp, Repeat 5 yrs, Dr. Louie Abad  2004    GAUDENCIO-LSO, fibroids    HX MASTECTOMY      left    HX MASTECTOMY  2004    left breast DCIS, total mastectomy and saline implant    HX TUBAL LIGATION      [place] COLONOSCOPY W/BIOPSY SINGLE/MULTIPLE      benign polyp; f/u 5 yrs, Dr Bette Javed  2005    benign polyp; f/u 5 yrs, Dr Ki Mccarty THYROID/PARATHYROID/SOFT TISS      normal         MEDICATIONS     Current Outpatient Medications   Medication Sig    atorvastatin (LIPITOR) 20 mg tablet TAKE 1 TABLET DAILY    Cetirizine 10 mg cap Take  by mouth daily. DOCOSAHEXANOIC ACID/EPA (FISH OIL PO) Take 1 Capsule by mouth daily. cholecalciferol (VITAMIN D3) 25 mcg (1,000 unit) cap Take 1,000 Units by mouth daily. beclomethasone (QVAR) 80 mcg/actuation aero Take 1 Puff by inhalation two (2) times daily as needed. albuterol (PROVENTIL HFA, VENTOLIN HFA, PROAIR HFA) 90 mcg/actuation inhaler Take 1 Puff by inhalation every six (6) hours as needed for Wheezing. (Patient not taking: Reported on 2022)     No current facility-administered medications for this visit.           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24    Diet: nothing special just tries to follow sensible diet overall     Caffeine: 20 oz decaff coffee a day, tea 2 x a week, seldom sodas    Exercise: walks 2-3 x a week x 30 minutes and tries to get in 10K steps a day 3 x a week, stays active in her yard    Weight: stable in the 160's over the years         Social History     Substance and Sexual Activity   Sexual Activity Yes    Partners: Male       IMMUNIZATIONS     Immunization History   Administered Date(s) Administered    COVID-19, PFIZER PURPLE top, DILUTE for use, (age 15 y+), IM, 30mcg/0.3mL 2021, 2021, 2021, 2022    Influenza Vaccine 10/01/2013    Influenza Vaccine (Tri) Adjuvanted (>65 Yrs FLUAD TRI ) 2020    Influenza, FLUAD, (age 72 y+), Adjuvanted 2021, 10/25/2022    Pneumococcal Conjugate (PCV-13) 2017    Pneumococcal Polysaccharide (PPSV-23) 2016         FAMILY HISTORY     Family History   Problem Relation Age of Onset    Breast Cancer Mother         dx in her 52's,  age 58    Cancer Mother         uterine cancer in her 52's, treated    Hypertension Father     Stroke Father          age 62    Seizures Father     Alcohol abuse Brother          age 36 from complications    OSTEOARTHRITIS Maternal Grandmother [de-identified]    Stroke Maternal Grandmother          in her [de-identified]    Cancer Maternal Grandfather         ?stomach cancer    Heart Disease Maternal Grandfather          in his 66's    Seizures Paternal Aunt     Breast Cancer Paternal Aunt         in her 66's    Breast Cancer Cousin         maternal 1st cousin    Arthritis-rheumatoid Neg Hx          VITALS   Visit Vitals  /70 (BP 1 Location: Left upper arm, BP Patient Position: Sitting, BP Cuff Size: Large adult)   Pulse 100   Temp 98.4 °F (36.9 °C) (Oral)   Resp 16   Ht 5' 8\" (1.727 m)   Wt 163 lb 9.6 oz (74.2 kg)   SpO2 99%   BMI 24.88 kg/m² PHYSICAL EXAMINATION   Physical Exam  Vitals reviewed. Constitutional:       General: She is not in acute distress. Appearance: Normal appearance. Cardiovascular:      Rate and Rhythm: Normal rate. Pulmonary:      Effort: Pulmonary effort is normal.   Neurological:      Mental Status: She is alert and oriented to person, place, and time. Motor: Motor function is intact. Coordination: Coordination is intact. Gait: Gait is intact. Gait normal.   Psychiatric:         Mood and Affect: Mood normal.         Cognition and Memory: Cognition and memory normal.              LABORATORY DATA/ANCILLARY/IMAGING     Results for orders placed or performed in visit on 10/25/22   VITAMIN B12 & FOLATE   Result Value Ref Range    Vitamin B12 364 193 - 986 pg/mL    Folate 19.8 5.0 - 21.0 ng/mL   HEMOGLOBIN A1C WITH EAG   Result Value Ref Range    Hemoglobin A1c 5.9 (H) 4.0 - 5.6 %    Est. average glucose 123 mg/dL   T4, FREE   Result Value Ref Range    T4, Free 0.9 0.8 - 1.5 NG/DL   TSH 3RD GENERATION   Result Value Ref Range    TSH 2.73 0.36 - 3.74 uIU/mL   LIPID PANEL   Result Value Ref Range    Cholesterol, total 210 (H) <200 MG/DL    Triglyceride 66 <150 MG/DL    HDL Cholesterol 73 MG/DL    LDL, calculated 123.8 (H) 0 - 100 MG/DL    VLDL, calculated 13.2 MG/DL    CHOL/HDL Ratio 2.9 0.0 - 5.0     METABOLIC PANEL, COMPREHENSIVE   Result Value Ref Range    Sodium 140 136 - 145 mmol/L    Potassium 4.3 3.5 - 5.1 mmol/L    Chloride 106 97 - 108 mmol/L    CO2 27 21 - 32 mmol/L    Anion gap 7 5 - 15 mmol/L    Glucose 82 65 - 100 mg/dL    BUN 13 6 - 20 MG/DL    Creatinine 0.84 0.55 - 1.02 MG/DL    BUN/Creatinine ratio 15 12 - 20      eGFR >60 >60 ml/min/1.73m2    Calcium 9.3 8.5 - 10.1 MG/DL    Bilirubin, total 0.6 0.2 - 1.0 MG/DL    ALT (SGPT) 20 12 - 78 U/L    AST (SGOT) 16 15 - 37 U/L    Alk.  phosphatase 58 45 - 117 U/L    Protein, total 7.3 6.4 - 8.2 g/dL    Albumin 4.3 3.5 - 5.0 g/dL    Globulin 3.0 2.0 - 4.0 g/dL    A-G Ratio 1.4 1.1 - 2.2     CBC W/O DIFF   Result Value Ref Range    WBC 3.9 3.6 - 11.0 K/uL    RBC 5.01 3.80 - 5.20 M/uL    HGB 13.5 11.5 - 16.0 g/dL    HCT 44.9 35.0 - 47.0 %    MCV 89.6 80.0 - 99.0 FL    MCH 26.9 26.0 - 34.0 PG    MCHC 30.1 30.0 - 36.5 g/dL    RDW 12.6 11.5 - 14.5 %    PLATELET 330 752 - 446 K/uL    MPV 10.9 8.9 - 12.9 FL    NRBC 0.0 0  WBC    ABSOLUTE NRBC 0.00 0.00 - 0.01 K/uL             ASSESSMENT & PLAN   Diagnoses and all orders for this visit:    1. Medicare annual wellness visit, subsequent  See separate note under this same encounter visit for Medicare Wellness note. Age/risk based screening recommendations, health maintenance & prevention counseling. Cancer screening USPTFS guidelines reviewed w/ pt today. Discussed benefits/positive/negative outcomes of screening based on age/risk stratification. Informed consent for/against screening based on pt's personal hx/risk factors. Updated in history above and health maintenance. 2. Hypercholesterolemia  LDL mildly elevated but improved over time on statin therapy  Continue current regimen of Lipitor 20 mg daily for now and work on dietary modification and increased exercise for now  Renewed:  -     atorvastatin (LIPITOR) 20 mg tablet; TAKE 1 TABLET DAILY    3. Prediabetes, newly diagnosed      Fasting lab results from 10-25-22, cardiovascular risk and specific lipid/LDL/HgbA1c goals reviewed  Reviewed low cholesterol/low carb diet, nutrition, meal planning, exercise, weight management, BMI/goals.   Reviewed medications, effects and possible side effects  Further follow up and recommendations pending results of upcoming EMG 12-13-22 and arterial doppler (pt scheduling, order already pended)  If stable, plan on follow up 6 months to reassess or sooner prn

## 2022-12-07 NOTE — PROGRESS NOTES
Chief Complaint   Patient presents with    Annual Wellness Visit     Had labs done     1. \"Have you been to the ER, urgent care clinic since your last visit? Hospitalized since your last visit? \" No    2. \"Have you seen or consulted any other health care providers outside of the [address] [place] [address] since your last visit? \" Krystyna Lieberman, ENT saw NP for ear flush today    3. For patients aged 39-70: Has the patient had a colonoscopy / FIT/ Cologuard? Yes - no Care Gap present 9/2020 Polyp, Repeat 5 yrs, Dr. Jeanette Guadalupe        If the patient is female:     4. For patients aged 41-77: Has the patient had a mammogram within the past 2 years? Yes - no Care Gap present 12/2021        5. For patients aged 21-65: Has the patient had a pap smear?  Yes - no Care Gap present gyn Dr hinson

## 2022-12-07 NOTE — PROGRESS NOTES
This is the Subsequent Medicare Annual Wellness Exam, performed 12 months or more after the Initial AWV or the last Subsequent AWV    I have reviewed the patient's medical history in detail and updated the computerized patient record. Assessment/Plan   Education and counseling provided:  Are appropriate based on today's review and evaluation    1. Medicare annual wellness visit, subsequent     Depression Risk Factor Screening     3 most recent PHQ Screens 12/7/2022   Little interest or pleasure in doing things Not at all   Feeling down, depressed, irritable, or hopeless Not at all   Total Score PHQ 2 0       Alcohol & Drug Abuse Risk Screen    Do you average more than 1 drink per night or more than 7 drinks a week:  No    On any one occasion in the past three months have you have had more than 3 drinks containing alcohol:  No          Functional Ability and Level of Safety    Hearing: Hearing is good. Activities of Daily Living: The home contains: no safety equipment. Patient does total self care  ADL Assessment 12/7/2022   Feeding yourself No Help Needed   Getting from bed to chair No Help Needed   Getting dressed No Help Needed   Bathing or showering No Help Needed   Walk across the room (includes cane/walker) No Help Needed   Using the telphone No Help Needed   Taking your medications No Help Needed   Preparing meals No Help Needed   Managing money (expenses/bills) No Help Needed   Moderately strenuous housework (laundry) No Help Needed   Shopping for personal items (toiletries/medicines) No Help Needed   Shopping for groceries No Help Needed   Driving No Help Needed   Climbing a flight of stairs No Help Needed   Getting to places beyond walking distances No Help Needed         Ambulation: with no difficulty     Fall Risk:  Fall Risk Assessment, last 12 mths 12/7/2022   Able to walk? Yes   Fall in past 12 months? 0   Do you feel unsteady?  0   Are you worried about falling 0   Number of falls in past 12 months -   Fall with injury?  -      Abuse Screen:  Patient is not abused       Cognitive Screening    Has your family/caregiver stated any concerns about your memory: no         Health Maintenance Due     Health Maintenance Due   Topic Date Due    Colorectal Cancer Screening Combo  06/08/2020    Breast Cancer Screen Mammogram  12/22/2022       Patient Care Team   Patient Care Team:  Erika Rodriguez MD as PCP - General  Erika Rodriguez MD as PCP - [facility] Empaneled Provider    History     Patient Active Problem List   Diagnosis Code    IBS (irritable bowel syndrome) K58.9    Perennial allergic rhinitis J30.89    Seasonal asthma J45.998    History of iron deficiency anemia Z86.2    Seborrheic keratosis L82.1    Depression with anxiety F41.8    History of breast cancer/DCIS, s/p left mastectomy 2004 and 5yr of Tamoxifen Z85.3    S/P GAUDENCIO (total abdominal hysterectomy) w/ LSO for Uterine Fibroids 2004 Z90.710    S/P colonoscopic polypectomy, benign 2005 Z98.890    Microscopic hematuria, benign R31.29    Osteoarthritis of right hip M16.10    Borderline abnormal thyroid function test R94.6    Vitamin D deficiency E55.9    Hypercholesterolemia E78.00    Ocular migraine G43.109    ACP (advance care planning) Z71.89     Past Medical History:   Diagnosis Date    ACP (advance care planning) 8/29/2019    ACP discussion 8-2019, AMD form given    Borderline abnormal thyroid function test 1/7/2014 1/2014    History of breast cancer, s/p left mastectomy 2004 and 5yr of Tamoxifen 9/14/2011    History of iron deficiency anemia 9/14/2011    Hypercholesterolemia 8/29/2019    IBS (irritable bowel syndrome) 3/18/2010    Microscopic hematuria, benign 9/14/2011    Ocular migraine 8/29/2019    Diagnosed by Ophthalmologist  [phone]    Osteoarthritis of right hip 9/14/2011    Perennial allergic rhinitis 9/14/2011    S/P colonoscopic polypectomy, benign 2005 9/14/2011    S/P GAUDENCIO (total abdominal hysterectomy) w/ LSO for Uterine Fibroids 2004 2011    Seasonal asthma 2011    PFT's allergist office 2011, Dr Smith     Seborrheic keratosis 2011    Vitamin D deficiency 2014      Past Surgical History:   Procedure Laterality Date    HAND/FINGER SURGERY UNLISTED      dislocated thumb surgery repair    HX  SECTION  ,     HX COLONOSCOPY  2015    Repeat 5 yrs/Luis Cruz    HX COLONOSCOPY  2020    Polyp, Repeat 5 yrs, Dr. Parker Her  2004    GAUDENCIO-LSO, fibroids    HX MASTECTOMY      left    HX MASTECTOMY  2004    left breast DCIS, total mastectomy and saline implant    HX TUBAL LIGATION      [place] COLONOSCOPY W/BIOPSY SINGLE/MULTIPLE      benign polyp; f/u 5 yrs, Dr Sonido De León  2005    benign polyp; f/u 5 yrs, Dr Irma Pierre THYROID/PARATHYROID/SOFT TISS      normal     Current Outpatient Medications   Medication Sig Dispense Refill    atorvastatin (LIPITOR) 20 mg tablet TAKE 1 TABLET DAILY (APPOINTMENT/LABS DUE) 90 Tablet 3    Cetirizine 10 mg cap Take  by mouth daily. DOCOSAHEXANOIC ACID/EPA (FISH OIL PO) Take 1 Capsule by mouth daily. cholecalciferol (VITAMIN D3) 25 mcg (1,000 unit) cap Take 1,000 Units by mouth daily. beclomethasone (QVAR) 80 mcg/actuation aero Take 1 Puff by inhalation two (2) times daily as needed. albuterol (PROVENTIL HFA, VENTOLIN HFA, PROAIR HFA) 90 mcg/actuation inhaler Take 1 Puff by inhalation every six (6) hours as needed for Wheezing.  (Patient not taking: Reported on 2022) 1 Inhaler 0     Allergies   Allergen Reactions    Pravachol [Pravastatin] Myalgia     40 mg    Zocor [Simvastatin] Myalgia     Tolerated 10 mg fine, but aching on 20 mg       Family History   Problem Relation Age of Onset    Breast Cancer Mother         dx in her 52's,  age 58    Cancer Mother         uterine cancer in her 52's, treated    Hypertension Father     Stroke Father          age 62 Seizures Father     Alcohol abuse Brother          age 36 from complications    OSTEOARTHRITIS Maternal Grandmother [de-identified]    Stroke Maternal Grandmother          in her [de-identified]    Cancer Maternal Grandfather         ?stomach cancer    Heart Disease Maternal Grandfather          in his 66's    Seizures Paternal Aunt     Breast Cancer Paternal Aunt         in her 66's    Breast Cancer Cousin         maternal 1st cousin    Arthritis-rheumatoid Neg Hx      Social History     Tobacco Use    Smoking status: Never    Smokeless tobacco: Never   Substance Use Topics    Alcohol use: Yes     Comment: occ wine or beer         Lauren Rooney MD

## 2022-12-09 ENCOUNTER — TELEPHONE (OUTPATIENT)
Dept: NEUROLOGY | Age: 72
End: 2022-12-09

## 2022-12-09 NOTE — TELEPHONE ENCOUNTER
Called patient to r/s EMG for later in the day on 12/13-provider in EMU. She states she is unable to come in later the day and wanted to know if there was anything else available at the other neurology clinics.

## 2023-01-17 ENCOUNTER — OFFICE VISIT (OUTPATIENT)
Dept: NEUROLOGY | Age: 73
End: 2023-01-17
Payer: MEDICARE

## 2023-01-17 DIAGNOSIS — R20.0 NUMBNESS OF BOTH LOWER EXTREMITIES: Primary | ICD-10-CM

## 2023-01-17 PROCEDURE — 95886 MUSC TEST DONE W/N TEST COMP: CPT | Performed by: PSYCHIATRY & NEUROLOGY

## 2023-01-17 PROCEDURE — 95910 NRV CNDJ TEST 7-8 STUDIES: CPT | Performed by: PSYCHIATRY & NEUROLOGY

## 2023-01-17 NOTE — PROGRESS NOTES
[address] [facility]  [address], [facility] [address]  Phone [phone] Fax [phone]  Test Date:  2023    Patient: Nicolás Avery : 1950 Physician: Pablo Phan. Rebel Arnold DO   Sex: Female Height: 5' 8\" Ref Phys: Mian Candelaria MD   ID#: [id number]  Weight: 163 lbs. Technician: Sergio Vargas     Patient Complaints:  Numbness/paresthesias of the distal lower extremities b/l    NCV & EMG Findings:  All nerve conduction studies were within normal limits. All left vs. right side differences were within normal limits. All F Wave latencies were within normal limits. All examined muscles (as indicated in the following table) showed no evidence of electrical instability. Impression:  Extensive electrodiagnostic examination of the right lower extremity and additional nerve conduction studies of the left lower extremity reveals no abnormalities. In particular, there is no evidence of a right lumbosacral motor radiculopathy. In addition, there is no evidence of a large-fiber generalized sensorimotor polyneuropathy. ___________________________  Janet Burger  Rebel Arnold DO        Nerve Conduction Studies  Anti Sensory Summary Table     Stim Site NR Peak (ms) Norm Peak (ms) P-T Amp (µV) Norm P-T Amp Onset (ms) Site1 Site2 Delta-P (ms) Dist (cm) Mayo (m/s) Norm Mayo (m/s)   Left Sup Peroneal Anti Sensory (Ant Lat Mall)  31.7°C   14 cm    2.5 <4.4 5.5 >5.0 1.9 14 cm Ant Lat Mall 2.5 14.0 56 >32   Site 2    2.5  10.7  2.0         Right Sup Peroneal Anti Sensory (Ant Lat Mall)  30°C   14 cm    2.7 <4.4 10.0 >5.0 2.1 14 cm Ant Lat Mall 2.7 14.0 52 >32   Left Sural Anti Sensory (Lat Mall)  30°C   Calf    3.9 <4.0 14.7 >5.0 3.3 Calf Lat Mall 3.9 14.0 36 >35   Right Sural Anti Sensory (Lat Mall)  30°C   Calf    3.7 <4.0 8.2 >5.0 3.0 Calf Lat Mall 3.7 14.0 38 >35     Motor Summary Table     Stim Site NR Onset (ms) Norm Onset (ms) O-P Amp (mV) Norm O-P Amp Site1 Site2 Delta-0 (ms) Dist (cm) Mayo (m/s) Norm Mayo (m/s)   Left Peroneal Motor (Ext Dig Brev)  29.8°C   Ankle    5.8 <6.1 3.7 >2.5 B Fib Ankle 8.8 38.0 43 >38   B Fib    14.6  3.3  Poplt B Fib 2.1 10.0 48 >40   Poplt    16.7  3.0          Right Peroneal Motor (Ext Dig Brev)  30°C   Ankle    4.7 <6.1 2.8 >2.5 B Fib Ankle 8.1 38.0 47 >38   B Fib    12.8  2.4  Poplt B Fib 2.0 10.0 50 >40   Poplt    14.8  1.9          Left Tibial Motor (Abd Jacome Brev)  30.9°C   Ankle    4.0 <6.1 8.8 >3.0 Knee Ankle 10.2 44.0 43 >35   Knee    14.2  5.2          Right Tibial Motor (Abd Jacome Brev)  30°C   Ankle    3.9 <6.1 8.4 >3.0 Knee Ankle 9.9 44.0 44 >35   Knee    13.8  5.5            F Wave Studies     NR F-Lat (ms) Lat Norm (ms) L-R F-Lat (ms) L-R Lat Norm   Left Tibial (Mrkrs) (Abd Hallucis)  30°C      60.38 <61  <5.7     EMG     Side Muscle Nerve Root Ins Act Fibs Psw Amp Dur Poly Recrt Int Priyanka Peers Comment   Right Ext Dig Brev Dp Br Peronel L5, S1 Nml Nml Nml Nml Nml 0 Nml Nml    Right AbdHallucis MedPlantar S1-2 Nml Nml Nml Nml Nml 0 Nml Nml    Right PostTibialis Tibial L5, S1 Nml Nml Nml Nml Nml 0 Nml Nml    Right Gastroc Tibial S1-2 Nml Nml Nml Nml Nml 0 Nml Nml    Right AntTibialis Dp Br Peronel L4-5 Nml Nml Nml Nml Nml 0 Nml Nml    Right RectFemoris Femoral L2-4 Nml Nml Nml Nml Nml 0 Nml Nml          Waveforms:

## 2023-10-28 NOTE — MR AVS SNAPSHOT
Visit Information Date & Time Provider Department Dept. Phone Encounter #  
 7/29/2017 10:00 AM Juan Miguel Unger, [address] [facility] [phone] [id number] Follow-up Instructions Return if symptoms worsen or fail to improve. Your Appointments 8/25/2017  8:00 AM  
ROUTINE CARE with Jeannette Bridges MD  
[facility]) Appt Note: FOLLOW UP VISIT  
 [address] [phone]  
[phone]  
  
   
 [address] [id number] Upcoming Health Maintenance Date Due  
 GLAUCOMA SCREENING Q2Y 2/15/2016 MEDICARE YEARLY EXAM 2/15/2016 DTaP/Tdap/Td series (1 - Tdap) 9/3/2017* Hepatitis C Screening 9/3/2018* ZOSTER VACCINE AGE 60> 9/13/2021* INFLUENZA AGE 9 TO ADULT 8/1/2017 Pneumococcal 65+ Low/Medium Risk (2 of 2 - PCV13) 9/7/2017 BREAST CANCER SCRN MAMMOGRAM 5/23/2018 COLONOSCOPY 6/8/2020 *Topic was postponed. The date shown is not the original due date. Allergies as of 7/29/2017  Review Complete On: 7/29/2017 By: Sindhu Ring MD  
  
 Severity Noted Reaction Type Reactions Pravachol [Pravastatin]  05/08/2015    Myalgia 40 mg  
 Zocor [Simvastatin]  03/18/2010    Myalgia Tolerated 10 mg fine, but aching on 20 mg  
  
Current Immunizations  Reviewed on 9/7/2016 Name Date Influenza Vaccine 10/1/2013 Pneumococcal Polysaccharide (PPSV-23) 9/7/2016 Not reviewed this visit You Were Diagnosed With   
  
 Codes Comments Nasal congestion    -  Primary ICD-10-CM: R09.81 ICD-9-CM: 478.19 Post-nasal drainage     ICD-10-CM: R09.82 ICD-9-CM: 473.9 Vitals BP Pulse Temp Resp Height(growth percentile) Weight(growth percentile) 110/69 (BP 1 Location: Left arm, BP Patient Position: Sitting) 90 98.6 °F (37 °C) (Oral) 14 5' 8\" (1.727 m) 166 lb 12.8 oz (75.7 kg) SpO2 BMI OB Status Smoking Status 96% 25.36 kg/m2 Hysterectomy Never Smoker Vitals History BMI and BSA Data Body Mass Index Body Surface Area  
 25.36 kg/m 2 1.91 m 2 Preferred Pharmacy Pharmacy Name Phone [facility] [phone] Your Updated Medication List  
  
   
This list is accurate as of: 7/29/17 10:44 AM.  Always use your most recent med list.  
  
  
  
  
 albuterol 90 mcg/actuation inhaler Commonly known as:  PROVENTIL HFA, VENTOLIN HFA, PROAIR HFA Take 1 Puff by inhalation every six (6) hours as needed for Wheezing. atorvastatin 20 mg tablet Commonly known as:  LIPITOR  
TAKE 1 TABLET DAILY CALCIUM+D PO Take  by mouth daily. Includes 400 units vit D  
  
 FISH OIL PO Take  by mouth daily. * fluticasone 50 mcg/actuation nasal spray Commonly known as:  Helder Calk 2 Sprays by Both Nostrils route daily. * fluticasone 50 mcg/actuation nasal spray Commonly known as:  Helder Calk 2 Sprays by Both Nostrils route daily. methylPREDNISolone 4 mg tablet Commonly known as:  Elisabet Landon Follow pack instruction. QVAR 80 mcg/actuation [facility] Generic drug:  beclomethasone Take 1 Puff by inhalation two (2) times a day. triamcinolone acetonide 0.1 % topical cream  
Commonly known as:  KENALOG  
  
 VITAMIN D3 1,000 unit Cap Generic drug:  cholecalciferol Take 1,000 Units by mouth daily. ZyrTEC 10 mg tablet Generic drug:  cetirizine Take 10 mg by mouth as needed. * Notice: This list has 2 medication(s) that are the same as other medications prescribed for you. Read the directions carefully, and ask your doctor or other care provider to review them with you. Prescriptions Sent to Pharmacy Refills  
 methylPREDNISolone (MEDROL DOSEPACK) 4 mg tablet 0 Sig: Follow pack instruction.   
 Class: Normal  
 Pharmacy: [facility]  #: [phone]  
 fluticasone (FLONASE) 50 mcg/actuation nasal spray 0 Si Sprays by Both Nostrils route daily. Class: Normal  
 Pharmacy: [facility]  #: [phone] Route: Both Nostrils Follow-up Instructions Return if symptoms worsen or fail to improve. Introducing [facility] SERVICES! [employer] introduces TeamVisibility patient portal. Now you can access parts of your medical record, email your doctor's office, and request medication refills online. 1. In your internet browser, go to [email or web address] 2. Click on the First Time User? Click Here link in the Sign In box. You will see the New Member Sign Up page. 3. Enter your TeamVisibility Access Code exactly as it appears below. You will not need to use this code after youve completed the sign-up process. If you do not sign up before the expiration date, you must request a new code. · TeamVisibility Access Code: [email or web address] Expires: 10/27/2017 10:43 AM 
 
4. Enter the last four digits of your Social Security Number (xxxx) and Date of Birth (mm/dd/yyyy) as indicated and click Submit. You will be taken to the next sign-up page. 5. Create a TeamVisibility ID. This will be your TeamVisibility login ID and cannot be changed, so think of one that is secure and easy to remember. 6. Create a TeamVisibility password. You can change your password at any time. 7. Enter your Password Reset Question and Answer. This can be used at a later time if you forget your password. 8. Enter your e-mail address. You will receive e-mail notification when new information is available in [address] 9. Click Sign Up. You can now view and download portions of your medical record. 10. Click the Download Summary menu link to download a portable copy of your medical information. If you have questions, please visit the Frequently Asked Questions section of the [employer] website. Remember, [employer] is NOT to be used for urgent needs. For medical emergencies, dial 911. Now available from your iPhone and Android! Please provide this summary of care documentation to your next provider. Your primary care clinician is listed as MARGARET LAURENT. If you have any questions after today's visit, please call [phone]. Bed/Stretcher in lowest position, wheels locked, appropriate side rails in place/Call bell, personal items and telephone in reach/Instruct patient to call for assistance before getting out of bed/chair/stretcher/Non-slip footwear applied when patient is off stretcher/[place] to call system/Physically safe environment - no spills, clutter or unnecessary equipment/Purposeful proactive rounding/Room/bathroom lighting operational, light cord in reach

## 2023-12-15 RX ORDER — ATORVASTATIN CALCIUM 20 MG/1
TABLET, FILM COATED ORAL
Qty: 90 TABLET | Refills: 0 | Status: SHIPPED | OUTPATIENT
Start: 2023-12-15

## 2024-01-30 ENCOUNTER — OFFICE VISIT (OUTPATIENT)
Age: 74
End: 2024-01-30
Payer: COMMERCIAL

## 2024-01-30 VITALS
RESPIRATION RATE: 16 BRPM | OXYGEN SATURATION: 97 % | WEIGHT: 160.8 LBS | HEART RATE: 77 BPM | BODY MASS INDEX: 24.37 KG/M2 | HEIGHT: 68 IN | SYSTOLIC BLOOD PRESSURE: 132 MMHG | DIASTOLIC BLOOD PRESSURE: 74 MMHG | TEMPERATURE: 98 F

## 2024-01-30 DIAGNOSIS — R73.03 PREDIABETES: ICD-10-CM

## 2024-01-30 DIAGNOSIS — Z00.00 MEDICARE ANNUAL WELLNESS VISIT, SUBSEQUENT: Primary | ICD-10-CM

## 2024-01-30 DIAGNOSIS — E55.9 VITAMIN D DEFICIENCY: ICD-10-CM

## 2024-01-30 DIAGNOSIS — E53.8 LOW SERUM VITAMIN B12: ICD-10-CM

## 2024-01-30 DIAGNOSIS — E78.00 HYPERCHOLESTEROLEMIA: ICD-10-CM

## 2024-01-30 PROBLEM — H61.23 CERUMINOSIS, BILATERAL: Status: ACTIVE | Noted: 2024-01-30

## 2024-01-30 PROBLEM — R20.2 TINGLING OF BOTH FEET: Status: ACTIVE | Noted: 2024-01-30

## 2024-01-30 PROCEDURE — 99214 OFFICE O/P EST MOD 30 MIN: CPT | Performed by: FAMILY MEDICINE

## 2024-01-30 PROCEDURE — G0439 PPPS, SUBSEQ VISIT: HCPCS | Performed by: FAMILY MEDICINE

## 2024-01-30 PROCEDURE — 1123F ACP DISCUSS/DSCN MKR DOCD: CPT | Performed by: FAMILY MEDICINE

## 2024-01-30 RX ORDER — MULTIVITAMIN
1 TABLET ORAL WEEKLY
COMMUNITY

## 2024-01-30 RX ORDER — CHLORAL HYDRATE 500 MG
1 CAPSULE ORAL WEEKLY
COMMUNITY

## 2024-01-30 SDOH — ECONOMIC STABILITY: HOUSING INSECURITY
IN THE LAST 12 MONTHS, WAS THERE A TIME WHEN YOU DID NOT HAVE A STEADY PLACE TO SLEEP OR SLEPT IN A SHELTER (INCLUDING NOW)?: NO

## 2024-01-30 SDOH — ECONOMIC STABILITY: INCOME INSECURITY: HOW HARD IS IT FOR YOU TO PAY FOR THE VERY BASICS LIKE FOOD, HOUSING, MEDICAL CARE, AND HEATING?: NOT HARD AT ALL

## 2024-01-30 SDOH — ECONOMIC STABILITY: FOOD INSECURITY: WITHIN THE PAST 12 MONTHS, YOU WORRIED THAT YOUR FOOD WOULD RUN OUT BEFORE YOU GOT MONEY TO BUY MORE.: NEVER TRUE

## 2024-01-30 SDOH — ECONOMIC STABILITY: FOOD INSECURITY: WITHIN THE PAST 12 MONTHS, THE FOOD YOU BOUGHT JUST DIDN'T LAST AND YOU DIDN'T HAVE MONEY TO GET MORE.: NEVER TRUE

## 2024-01-30 ASSESSMENT — LIFESTYLE VARIABLES
HOW MANY STANDARD DRINKS CONTAINING ALCOHOL DO YOU HAVE ON A TYPICAL DAY: 1 OR 2
HOW OFTEN DO YOU HAVE A DRINK CONTAINING ALCOHOL: 2-3 TIMES A WEEK

## 2024-01-30 ASSESSMENT — PATIENT HEALTH QUESTIONNAIRE - PHQ9
SUM OF ALL RESPONSES TO PHQ QUESTIONS 1-9: 0
1. LITTLE INTEREST OR PLEASURE IN DOING THINGS: 0
SUM OF ALL RESPONSES TO PHQ9 QUESTIONS 1 & 2: 0
SUM OF ALL RESPONSES TO PHQ QUESTIONS 1-9: 0
2. FEELING DOWN, DEPRESSED OR HOPELESS: 0
SUM OF ALL RESPONSES TO PHQ QUESTIONS 1-9: 0
SUM OF ALL RESPONSES TO PHQ QUESTIONS 1-9: 0

## 2024-01-30 NOTE — PROGRESS NOTES
Chief Complaint   Patient presents with    Medicare AWV         Cholesterol Problem     Fasting    Prediabetes     1. \"Have you been to the ER, urgent care clinic since your last visit?  Hospitalized since your last visit?\" No    2. \"Have you seen or consulted any other health care providers outside of the [facility] since your last visit?\" Surgeon Dr Glasgow, eye exam Dr Feng    3. For patients aged 45-75: Has the patient had a colonoscopy / FIT/ Cologuard? Yes - no Care Gap present 9/2020 Polyp, Repeat 5 yrs, Dr. Hunter Polyp, Repeat 5 yrs, Dr. Hunter       If the patient is female:    4. For patients aged 40-74: Has the patient had a mammogram within the past 2 years? Yes - no Care Gap present 1/022      5. For patients aged 21-65: Has the patient had a pap smear? Yes - no Care Gap present gyn Dr Castro ~ Nov      Medicare Annual Wellness Visit    Wendy Otto is here for Medicare AWV (), Cholesterol Problem (Fasting), and Prediabetes    Assessment & Plan   Medicare annual wellness visit, subsequent  Recommendations for Preventive Services Due: see orders and patient instructions/AVS.  Recommended screening schedule for the next 5-10 years is provided to the patient in written form: see Patient Instructions/AVS.  Hypercholesterolemia  Maintained on Lipitor 20 mg daily and Omega 3 Fish Oil supplementation  Follow low cholesterol, high fiber diet and work on increasing exercise more regularly at least 30 minutes 5 x a week  -     Comprehensive Metabolic Panel; Future  -     Lipid Panel; Future  -     TSH; Future  Prediabetes  Glucose and HgbA1c goals reviewed w/ pt today  Follow a sugar free, low carb diet, work on getting regular exercise and weight mgt goals  -     Comprehensive Metabolic Panel; Future  -     Hemoglobin A1C; Future  Vitamin D deficiency  -     Vitamin D 25 Hydroxy; Future  Low serum vitamin B12  -     CBC; Future  -     Vitamin B12; Future    Patient is fasting

## 2024-02-01 ENCOUNTER — NURSE ONLY (OUTPATIENT)
Age: 74
End: 2024-02-01

## 2024-02-01 DIAGNOSIS — R73.03 PREDIABETES: ICD-10-CM

## 2024-02-01 DIAGNOSIS — E78.00 HYPERCHOLESTEROLEMIA: ICD-10-CM

## 2024-02-01 DIAGNOSIS — E53.8 LOW SERUM VITAMIN B12: ICD-10-CM

## 2024-02-01 DIAGNOSIS — E55.9 VITAMIN D DEFICIENCY: ICD-10-CM

## 2024-02-01 LAB
25(OH)D3 SERPL-MCNC: 26.9 NG/ML (ref 30–100)
ALBUMIN SERPL-MCNC: 4.5 G/DL (ref 3.5–5)
ALBUMIN/GLOB SERPL: 1.3 (ref 1.1–2.2)
ALP SERPL-CCNC: 61 U/L (ref 45–117)
ALT SERPL-CCNC: 19 U/L (ref 12–78)
ANION GAP SERPL CALC-SCNC: 4 MMOL/L (ref 5–15)
AST SERPL-CCNC: 13 U/L (ref 15–37)
BILIRUB SERPL-MCNC: 0.6 MG/DL (ref 0.2–1)
BUN SERPL-MCNC: 11 MG/DL (ref 6–20)
BUN/CREAT SERPL: 13 (ref 12–20)
CALCIUM SERPL-MCNC: 9.5 MG/DL (ref 8.5–10.1)
CHLORIDE SERPL-SCNC: 104 MMOL/L (ref 97–108)
CHOLEST SERPL-MCNC: 242 MG/DL
CO2 SERPL-SCNC: 30 MMOL/L (ref 21–32)
CREAT SERPL-MCNC: 0.84 MG/DL (ref 0.55–1.02)
ERYTHROCYTE [DISTWIDTH] IN BLOOD BY AUTOMATED COUNT: 12.6 % (ref 11.5–14.5)
GLOBULIN SER CALC-MCNC: 3.4 G/DL (ref 2–4)
GLUCOSE SERPL-MCNC: 90 MG/DL (ref 65–100)
HCT VFR BLD AUTO: 46.1 % (ref 35–47)
HDLC SERPL-MCNC: 83 MG/DL
HDLC SERPL: 2.9 (ref 0–5)
HGB BLD-MCNC: 13.8 G/DL (ref 11.5–16)
LDLC SERPL CALC-MCNC: 147.2 MG/DL (ref 0–100)
MCH RBC QN AUTO: 26.5 PG (ref 26–34)
MCHC RBC AUTO-ENTMCNC: 29.9 G/DL (ref 30–36.5)
MCV RBC AUTO: 88.5 FL (ref 80–99)
NRBC # BLD: 0 K/UL (ref 0–0.01)
NRBC BLD-RTO: 0 PER 100 WBC
PLATELET # BLD AUTO: 263 K/UL (ref 150–400)
PMV BLD AUTO: 10.9 FL (ref 8.9–12.9)
POTASSIUM SERPL-SCNC: 3.9 MMOL/L (ref 3.5–5.1)
PROT SERPL-MCNC: 7.9 G/DL (ref 6.4–8.2)
RBC # BLD AUTO: 5.21 M/UL (ref 3.8–5.2)
SODIUM SERPL-SCNC: 138 MMOL/L (ref 136–145)
TRIGL SERPL-MCNC: 59 MG/DL
TSH SERPL DL<=0.05 MIU/L-ACNC: 3.33 UIU/ML (ref 0.36–3.74)
VIT B12 SERPL-MCNC: 448 PG/ML (ref 193–986)
VLDLC SERPL CALC-MCNC: 11.8 MG/DL
WBC # BLD AUTO: 4.6 K/UL (ref 3.6–11)

## 2024-02-02 LAB
EST. AVERAGE GLUCOSE BLD GHB EST-MCNC: 117 MG/DL
HBA1C MFR BLD: 5.7 % (ref 4–5.6)

## 2024-02-04 ENCOUNTER — TELEPHONE (OUTPATIENT)
Age: 74
End: 2024-02-04

## 2024-02-04 NOTE — TELEPHONE ENCOUNTER
Advise pt her blood counts, liver, kidney, electrolytes, B12, and thyroid function are all okay.     HgBA1c remains in prediabetes range. Cut out sweets/sugars and watch carbs.    Her Vitamin D level is low and went down from last check. Is she taking her current supplement of Vitamin D3 1,000 units/day consistently on a routine basis? If not, then start being more regular with that. Otherwise increase regimen to 2,000 units instead and try to incorporate more Vitamin D rich foods into her diet.     Her LDL (\"bad cholesterol\") is still high and has gone up higher than it has been over the last several years which is surprising given she is taking Lipitor. Is she taking that regularly? Missing doses? Her LDL has not been at goal of <100 x years but is steadily getting worse each year. So if she is taking the medication consistently, I do recommend either increasing her dose or changing to Crestor in addition to her working on low cholesterol diet and increasing exercise to at least 30 minutes 5 days a week.    Let me know about the above and we will go from there. If she does not change meds, follow up in 6 months. If she changes meds follow up in 3 months.

## 2024-02-06 NOTE — TELEPHONE ENCOUNTER
Called pt with results.    She said that she is taking the Vit D3 1000 u everyday.  She will increase the does to 2000 u    She is taking the lipitor everyday.  She said that she hasn't been working as hard about her diet and exercise as she should have.  She would prefer to work on diet and exercise and re check in 6 months before any med changes.  She will call back to schedule the 6 month fasting OV.

## 2024-03-15 RX ORDER — ATORVASTATIN CALCIUM 20 MG/1
TABLET, FILM COATED ORAL
Qty: 90 TABLET | Refills: 1 | Status: SHIPPED | OUTPATIENT
Start: 2024-03-15

## 2024-04-15 LAB — MAMMOGRAPHY, EXTERNAL: NORMAL

## 2024-07-22 NOTE — MR AVS SNAPSHOT
Refill Routing Note   Medication(s) are not appropriate for processing by [facility] for the following reason(s):        Non-participating provider    [facility] action(s):  Route               Appointments  past 12m or future 3m with PCP    Date Provider   Last Visit   10/12/2023 Nazanin Malik, NP   Next Visit   Visit date not found Nazanin Malik, NP   ED visits in past 90 days: 0        Note composed:10:06 AM 07/22/2024           Visit Information Date & Time Provider Department Dept. Phone Encounter #  
 1/6/2018  9:00 AM Kaila Rivas MD [address] [place] [address] [phone] [id number] Follow-up Instructions Return if symptoms worsen or fail to improve. Upcoming Health Maintenance Date Due DTaP/Tdap/Td series (1 - Tdap) 2/15/1972 Influenza Age 5 to Adult 8/1/2017 Pneumococcal 65+ Low/Medium Risk (2 of 2 - PCV13) 9/7/2017 Hepatitis C Screening 9/3/2018* ZOSTER VACCINE AGE 60> 9/13/2021* BREAST CANCER SCRN MAMMOGRAM 5/23/2018 MEDICARE YEARLY EXAM 8/26/2018 GLAUCOMA SCREENING Q2Y 3/1/2019 COLONOSCOPY 6/8/2020 *Topic was postponed. The date shown is not the original due date. Allergies as of 1/6/2018  Review Complete On: 1/6/2018 By: Kaila Rivas MD  
  
 Severity Noted Reaction Type Reactions Pravachol [Pravastatin]  05/08/2015    Myalgia 40 mg  
 Zocor [Simvastatin]  03/18/2010    Myalgia Tolerated 10 mg fine, but aching on 20 mg  
  
Current Immunizations  Reviewed on 8/25/2017 Name Date Influenza Vaccine 10/1/2013 Pneumococcal Polysaccharide (PPSV-23) 9/7/2016 Not reviewed this visit You Were Diagnosed With   
  
 Codes Comments Acute nasopharyngitis    -  Primary ICD-10-CM: Jose Francisco Samaniego ICD-9-CM: 424 Vitals BP Pulse Temp Resp Height(growth percentile) Weight(growth percentile) 130/74 83 98.2 °F (36.8 °C) (Oral) 18 5' 8\" (1.727 m) 162 lb 3.2 oz (73.6 kg) BMI OB Status Smoking Status 24.66 kg/m2 Hysterectomy Never Smoker Vitals History BMI and BSA Data Body Mass Index Body Surface Area  
 24.66 kg/m 2 1.88 m 2 Preferred Pharmacy Pharmacy Name Phone [facility] [phone] Your Updated Medication List  
  
   
This list is accurate as of: 1/6/18  9:43 AM.  Always use your most recent med list.  
  
  
  
  
 albuterol 90 mcg/actuation inhaler Commonly known as:  PROVENTIL HFA, VENTOLIN HFA, PROAIR HFA Take 1 Puff by inhalation every six (6) hours as needed for Wheezing. atorvastatin 20 mg tablet Commonly known as:  LIPITOR  
TAKE 1 TABLET DAILY CALCIUM+D PO Take  by mouth daily. Includes 400 units vit D  
  
 CLARITIN 10 mg tablet Generic drug:  loratadine Take 10 mg by mouth. FISH OIL PO Take  by mouth daily. fluticasone 50 mcg/actuation nasal spray Commonly known as:  Marlaine Romance 2 Sprays by Both Nostrils route daily. QVAR 80 mcg/actuation [employer] Generic drug:  beclomethasone Take 1 Puff by inhalation two (2) times daily as needed. VITAMIN C 250 mg tablet Generic drug:  ascorbic acid (vitamin C) Take  by mouth. VITAMIN D3 1,000 unit Cap Generic drug:  cholecalciferol Take 1,000 Units by mouth daily. Follow-up Instructions Return if symptoms worsen or fail to improve. Patient Instructions For your symptoms: Your symptoms may improve with an oral antihistamine. These are available over the counter and include: 
Loratadine/claritin Cetirizine/Zyrtec Fexofenadine/Allegra Levocetirizine/Xyzal 
 
· Your symptoms may improve with a nasal steroid. These are available over the counter and include: · Flonase (aka fluticasone) · Nasocort (aka triamcinolone) · Nasonex (aka mometasone) · Rhinocort (aka budesonide) · Increase fluid intake, especially water to thin mucous and boost the immune system. · Avoid sugar and dairy while congested since they thicken mucous. · Get plenty of rest!   
· Gargle 3 times daily and as needed in Listerine or warm salt water vinegar solutions (1 tsp salt, 1 tsp vinegar in 1 cup lukewarm water.) · Use OTC nasal saline spray up each nostril four times daily. You could also consider using a netipot with distilled water. · Use humidifier at bedtime. · Use OTC Mucinex 600 mg twice daily to loosen mucous. Try generic guaifenesin with dextromethorphan. · Use OTC Tylenol (up to 650mg every 6 hours) or Ibuprofen (up to 800 mg every 8 hours) as needed for pain, fever or headaches. ·  Nasal Rinse Return to the doctor for evaluation: · If mucous is consistently discolored yellow or green throughout the day for more than a week · If you develop worsening facial pain · If you develop a fever that will not go away · If your symptoms worsen instead of improve Saline Nasal Washes: Care Instructions Your Care Instructions Saline nasal washes help keep the nasal passages open by washing out thick or dried mucus. This simple remedy can help relieve symptoms of allergies, sinusitis, and colds. It also can make the nose feel more comfortable by keeping the mucous membranes moist. You may notice a little burning sensation in your nose the first few times you use the solution, but this usually gets better in a few days. Follow-up care is a key part of your treatment and safety. Be sure to make and go to all appointments, and call your doctor if you are having problems. It's also a good idea to know your test results and keep a list of the medicines you take. How can you care for yourself at home? · You can buy premixed saline solution in a squeeze bottle or other sinus rinse products at a drugstore. Read and follow the instructions on the label. · You also can make your own saline solution by adding 1 teaspoon of salt and 1 teaspoon of baking soda to 2 cups of distilled water. · If you use a homemade solution, pour a small amount into a clean bowl. Using a rubber bulb syringe, squeeze the syringe and place the tip in the salt water. Pull a small amount of the salt water into the syringe by relaxing your hand. · Sit down with your head tilted slightly back. Do not lie down.  Put the tip of the bulb syringe or the squeeze bottle a little way into one of your nostrils. Gently drip or squirt a few drops into the nostril. Repeat with the other nostril. Some sneezing and gagging are normal at first. 
· Gently blow your nose. · Wipe the syringe or bottle tip clean after each use. · Repeat this 2 or 3 times a day. · Use nasal washes gently if you have nosebleeds often. When should you call for help? Watch closely for changes in your health, and be sure to contact your doctor if: 
? · You often get nosebleeds. ? · You have problems doing the nasal washes. Where can you learn more? Go to http://rory-gabriela.info/. Enter [id number] [id number] [phone] in the search box to learn more about \"Saline Nasal Washes: Care Instructions. \" Current as of: May 12, 2017 Content Version: 11.4 © 5914-1191 [employer]. Care instructions adapted under license by [employer] (which disclaims liability or warranty for this information). If you have questions about a medical condition or this instruction, always ask your healthcare professional. [place] [address] any warranty or liability for your use of this information. Upper Respiratory Infection (Cold): Care Instructions Your Care Instructions An upper respiratory infection, or URI, is an infection of the nose, sinuses, or throat. URIs are spread by coughs, sneezes, and direct contact. The common cold is the most frequent kind of URI. The flu and sinus infections are other kinds of URIs. Almost all URIs are caused by viruses. Antibiotics won't cure them. But you can treat most infections with home care. This may include drinking lots of fluids and taking over-the-counter pain medicine. You will probably feel better in 4 to 10 days. The doctor has checked you carefully, but problems can develop later. If you notice any problems or new symptoms, get medical treatment right away. Follow-up care is a key part of your treatment and safety. Be sure to make and go to all appointments, and call your doctor if you are having problems. It's also a good idea to know your test results and keep a list of the medicines you take. How can you care for yourself at home? · To prevent dehydration, drink plenty of fluids, enough so that your urine is light yellow or clear like water. Choose water and other caffeine-free clear liquids until you feel better. If you have kidney, heart, or liver disease and have to limit fluids, talk with your doctor before you increase the amount of fluids you drink. · Take an over-the-counter pain medicine, such as acetaminophen (Tylenol), ibuprofen (Advil, Motrin), or naproxen (Aleve). Read and follow all instructions on the label. · Before you use cough and cold medicines, check the label. These medicines may not be safe for young children or for people with certain health problems. · Be careful when taking over-the-counter cold or flu medicines and Tylenol at the same time. Many of these medicines have acetaminophen, which is Tylenol. Read the labels to make sure that you are not taking more than the recommended dose. Too much acetaminophen (Tylenol) can be harmful. · Get plenty of rest. 
· Do not smoke or allow others to smoke around you. If you need help quitting, talk to your doctor about stop-smoking programs and medicines. These can increase your chances of quitting for good. When should you call for help? Call 911 anytime you think you may need emergency care. For example, call if: 
? · You have severe trouble breathing. ?Call your doctor now or seek immediate medical care if: 
? · You seem to be getting much sicker. ? · You have new or worse trouble breathing. ? · You have a new or higher fever. ? · You have a new rash. ? Watch closely for changes in your health, and be sure to contact your doctor if: ? · You have a new symptom, such as a sore throat, an earache, or sinus pain. ? · You cough more deeply or more often, especially if you notice more mucus or a change in the color of your mucus. ? · You do not get better as expected. Where can you learn more? Go to http://rory-gabriela.info/. Enter F440 in the search box to learn more about \"Upper Respiratory Infection (Cold): Care Instructions. \" Current as of: May 12, 2017 Content Version: 11.4 © 5820-0515 [employer]. Care instructions adapted under license by [employer] (which disclaims liability or warranty for this information). If you have questions about a medical condition or this instruction, always ask your healthcare professional. [address] any warranty or liability for your use of this information. Introducing [facility] SERVICES! [employer] introduces [employer] patient portal. Now you can access parts of your medical record, email your doctor's office, and request medication refills online. 1. In your internet browser, go to [email or web address] 2. Click on the First Time User? Click Here link in the Sign In box. You will see the New Member Sign Up page. 3. Enter your [employer] Access Code exactly as it appears below. You will not need to use this code after youve completed the sign-up process. If you do not sign up before the expiration date, you must request a new code. · [employer] Access Code: [email or web address] Expires: 4/6/2018  9:43 AM 
 
4. Enter the last four digits of your Social Security Number (xxxx) and Date of Birth (mm/dd/yyyy) as indicated and click Submit. You will be taken to the next sign-up page. 5. Create a Verimatrixt ID. This will be your [employer] login ID and cannot be changed, so think of one that is secure and easy to remember. 6. Create a Verimatrixt password. You can change your password at any time. 7. Enter your Password Reset Question and Answer. This can be used at a later time if you forget your password. 8. Enter your e-mail address. You will receive e-mail notification when new information is available in [address] 9. Click Sign Up. You can now view and download portions of your medical record. 10. Click the Download Summary menu link to download a portable copy of your medical information. If you have questions, please visit the Frequently Asked Questions section of the [employer] website. Remember, [employer] is NOT to be used for urgent needs. For medical emergencies, dial 911. Now available from your iPhone and Android! Please provide this summary of care documentation to your next provider. Your primary care clinician is listed as MARGARET LAURENT. If you have any questions after today's visit, please call [phone].

## 2024-09-10 RX ORDER — ATORVASTATIN CALCIUM 20 MG/1
TABLET, FILM COATED ORAL
Qty: 90 TABLET | Refills: 1 | Status: SHIPPED | OUTPATIENT
Start: 2024-09-10

## 2025-02-24 ENCOUNTER — TELEPHONE (OUTPATIENT)
Age: 75
End: 2025-02-24

## 2025-02-24 NOTE — TELEPHONE ENCOUNTER
----- Message from CELINE GONZALEZ MA sent at 2/24/2025  8:37 AM EST -----  Regarding: FW: [facility] Appointment Request    ----- Message -----  From: Eladia Reardon  Sent: 2/21/2025   5:04 PM EST  To: [facility] Clinical Staff  Subject: [facility] Appointment Request                          [facility] Appointment Request    Patient needs appointment for [facility] Appointment Type: Annual Visit.    Patient Requested Dates(s): March- April   Patient Requested Time: morning  Provider Name:  Nanette Gauthier MD        Reason for Appointment Request: Established Patient - Available appointments did not meet patient need  --------------------------------------------------------------------------------------------------------------------------    Relationship to Patient: Self     Call Back Information: OK to leave message on voicemail  Preferred Call Back Number: Phone [phone]

## 2025-02-24 NOTE — TELEPHONE ENCOUNTER
Pt's scheduled to have a medication follow-up on 3/12/25 @ 10:20 AM,being that she cannot get into have her AMW until 7/30/25 @ 8:40 AM.

## 2025-03-12 ENCOUNTER — OFFICE VISIT (OUTPATIENT)
Age: 75
End: 2025-03-12
Payer: COMMERCIAL

## 2025-03-12 VITALS
OXYGEN SATURATION: 98 % | TEMPERATURE: 98.7 F | HEART RATE: 84 BPM | DIASTOLIC BLOOD PRESSURE: 68 MMHG | BODY MASS INDEX: 24.37 KG/M2 | SYSTOLIC BLOOD PRESSURE: 124 MMHG | RESPIRATION RATE: 16 BRPM | WEIGHT: 160.8 LBS | HEIGHT: 68 IN

## 2025-03-12 DIAGNOSIS — E55.9 VITAMIN D DEFICIENCY: ICD-10-CM

## 2025-03-12 DIAGNOSIS — E53.8 LOW SERUM VITAMIN B12: ICD-10-CM

## 2025-03-12 DIAGNOSIS — R73.03 PREDIABETES: ICD-10-CM

## 2025-03-12 DIAGNOSIS — E78.00 HYPERCHOLESTEROLEMIA: Primary | ICD-10-CM

## 2025-03-12 LAB
25(OH)D3 SERPL-MCNC: 45.1 NG/ML (ref 30–100)
ALBUMIN SERPL-MCNC: 4.4 G/DL (ref 3.5–5)
ALBUMIN/GLOB SERPL: 1.4 (ref 1.1–2.2)
ALP SERPL-CCNC: 60 U/L (ref 45–117)
ALT SERPL-CCNC: 16 U/L (ref 12–78)
ANION GAP SERPL CALC-SCNC: 5 MMOL/L (ref 2–12)
AST SERPL-CCNC: 16 U/L (ref 15–37)
BILIRUB SERPL-MCNC: 0.7 MG/DL (ref 0.2–1)
BUN SERPL-MCNC: 15 MG/DL (ref 6–20)
BUN/CREAT SERPL: 19 (ref 12–20)
CALCIUM SERPL-MCNC: 9.5 MG/DL (ref 8.5–10.1)
CHLORIDE SERPL-SCNC: 105 MMOL/L (ref 97–108)
CHOLEST SERPL-MCNC: 220 MG/DL
CO2 SERPL-SCNC: 26 MMOL/L (ref 21–32)
CREAT SERPL-MCNC: 0.77 MG/DL (ref 0.55–1.02)
ERYTHROCYTE [DISTWIDTH] IN BLOOD BY AUTOMATED COUNT: 12.6 % (ref 11.5–14.5)
EST. AVERAGE GLUCOSE BLD GHB EST-MCNC: 120 MG/DL
GLOBULIN SER CALC-MCNC: 3.2 G/DL (ref 2–4)
GLUCOSE SERPL-MCNC: 92 MG/DL (ref 65–100)
HBA1C MFR BLD: 5.8 % (ref 4–5.6)
HCT VFR BLD AUTO: 41.5 % (ref 35–47)
HDLC SERPL-MCNC: 83 MG/DL
HDLC SERPL: 2.7 (ref 0–5)
HGB BLD-MCNC: 13 G/DL (ref 11.5–16)
LDLC SERPL CALC-MCNC: 125.6 MG/DL (ref 0–100)
MCH RBC QN AUTO: 27.1 PG (ref 26–34)
MCHC RBC AUTO-ENTMCNC: 31.3 G/DL (ref 30–36.5)
MCV RBC AUTO: 86.5 FL (ref 80–99)
NRBC # BLD: 0 K/UL (ref 0–0.01)
NRBC BLD-RTO: 0 PER 100 WBC
PLATELET # BLD AUTO: 267 K/UL (ref 150–400)
PMV BLD AUTO: 11.1 FL (ref 8.9–12.9)
POTASSIUM SERPL-SCNC: 3.9 MMOL/L (ref 3.5–5.1)
PROT SERPL-MCNC: 7.6 G/DL (ref 6.4–8.2)
RBC # BLD AUTO: 4.8 M/UL (ref 3.8–5.2)
SODIUM SERPL-SCNC: 136 MMOL/L (ref 136–145)
TRIGL SERPL-MCNC: 57 MG/DL
TSH SERPL DL<=0.05 MIU/L-ACNC: 1.95 UIU/ML (ref 0.36–3.74)
VIT B12 SERPL-MCNC: 465 PG/ML (ref 193–986)
VLDLC SERPL CALC-MCNC: 11.4 MG/DL
WBC # BLD AUTO: 4.5 K/UL (ref 3.6–11)

## 2025-03-12 PROCEDURE — 99214 OFFICE O/P EST MOD 30 MIN: CPT | Performed by: FAMILY MEDICINE

## 2025-03-12 PROCEDURE — 1123F ACP DISCUSS/DSCN MKR DOCD: CPT | Performed by: FAMILY MEDICINE

## 2025-03-12 RX ORDER — DICLOFENAC SODIUM 75 MG/1
75 TABLET, DELAYED RELEASE ORAL PRN
COMMUNITY
Start: 2024-12-20

## 2025-03-12 RX ORDER — BECLOMETHASONE DIPROPIONATE HFA 80 UG/1
2 AEROSOL, METERED RESPIRATORY (INHALATION) 2 TIMES DAILY
COMMUNITY
Start: 2024-12-19

## 2025-03-12 SDOH — ECONOMIC STABILITY: FOOD INSECURITY: WITHIN THE PAST 12 MONTHS, YOU WORRIED THAT YOUR FOOD WOULD RUN OUT BEFORE YOU GOT MONEY TO BUY MORE.: NEVER TRUE

## 2025-03-12 SDOH — ECONOMIC STABILITY: FOOD INSECURITY: WITHIN THE PAST 12 MONTHS, THE FOOD YOU BOUGHT JUST DIDN'T LAST AND YOU DIDN'T HAVE MONEY TO GET MORE.: NEVER TRUE

## 2025-03-12 ASSESSMENT — PATIENT HEALTH QUESTIONNAIRE - PHQ9
1. LITTLE INTEREST OR PLEASURE IN DOING THINGS: NOT AT ALL
SUM OF ALL RESPONSES TO PHQ QUESTIONS 1-9: 0
2. FEELING DOWN, DEPRESSED OR HOPELESS: NOT AT ALL
SUM OF ALL RESPONSES TO PHQ QUESTIONS 1-9: 0

## 2025-03-12 ASSESSMENT — ENCOUNTER SYMPTOMS
RESPIRATORY NEGATIVE: 1
GASTROINTESTINAL NEGATIVE: 1

## 2025-03-13 NOTE — PROGRESS NOTES
Chief Complaint   Patient presents with    Cholesterol Problem     fasting    pre DM    Medication Check     1. \"Have you been to the ER, urgent care clinic since your last visit?  Hospitalized since your last visit?\" No    2. \"Have you seen or consulted any other health care providers outside of the [facility] since your last visit?\" Allergist @ [facility], breast surgeon Dr Praveena Glasgow    3. For patients aged 45-75: Has the patient had a colonoscopy / FIT/ Cologuard? Yes - no Care Gap present 9/2020 Polyp, Repeat 5 yrs, Dr. Hunter       If the patient is female:    4. For patients aged 40-74: Has the patient had a mammogram within the past 2 years? Yes - no Care Gap present 4/2024 done @ [facility]      5. For patients aged 21-65: Has the patient had a pap smear? Gyn Dr Siren with in a couple years      
morning and at bedtime    diclofenac (VOLTAREN) 75 MG EC tablet Take 1 tablet by mouth as needed for Pain    atorvastatin (LIPITOR) 20 MG tablet TAKE 1 TABLET DAILY    Omega-3 Fatty Acids (FISH OIL) 1000 MG capsule Take 1 capsule by mouth once a week    Multiple Vitamin (MULTI-VITAMIN DAILY) TABS Take 1 tablet by mouth daily    albuterol sulfate HFA (PROVENTIL;VENTOLIN;PROAIR) 108 (90 Base) MCG/ACT inhaler Inhale 1 puff into the lungs every 6 hours as needed    Cetirizine HCl 10 MG CAPS Take 5 mg by mouth daily    vitamin D 25 MCG (1000 UT) CAPS Take 2 capsules by mouth daily     No current facility-administered medications for this visit.          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 [place]     Social History     Substance and Sexual Activity   Sexual Activity Defer    Partners: Male    Birth control/protection: Post-menopausal       IMMUNIZATIONS     Immunization History   Administered Date(s) Administered    COVID-19, PFIZER PURPLE top, DILUTE for use, (age 12 y+), 30mcg/0.3mL 02/27/2021, 03/20/2021, 11/26/2021, 07/31/2022    COVID-19, PFIZER, (age 12y+), IM, 30mcg/0.3mL 12/09/2023, 01/02/2025    Influenza Virus Vaccine 10/01/2013    Influenza, FLUAD, (age 65 y+), IM, Quadv, 0.5mL 11/08/2021, 10/25/2022, 12/02/2023    Influenza, FLUAD, (age 65 y+), IM, Trivalent PF, 0.5mL 09/14/2020    Influenza, FLUZONE High Dose, (age 65 y+), IM, Trivalent PF, 0.5mL 12/21/2024    Pneumococcal, PCV-13, PREVNAR 13,

## 2025-03-15 ENCOUNTER — RESULTS FOLLOW-UP (OUTPATIENT)
Age: 75
End: 2025-03-15

## 2025-04-16 NOTE — TELEPHONE ENCOUNTER
PT called stating she needs a refill on atorvastatin (LIPITOR) 20 MG tablet and is now with [facility] and not with [facility]. call back PT @[phone]  for further details

## 2025-04-18 ENCOUNTER — TELEPHONE (OUTPATIENT)
Age: 75
End: 2025-04-18

## 2025-04-18 NOTE — TELEPHONE ENCOUNTER
Pt called asking for updates on medication refill on atorvastatin (LIPITOR) 20 MG tablet. I stated to pt that medication was sent to PCP and we are just waiting on approval from PCP. Pt states she is  now with [facility] and NOT with [facility]. Pt is also asking for a 90 day prescription and not a 30 days. Pt would like a call back with updates.    BCBN [phone]

## 2025-04-19 RX ORDER — ATORVASTATIN CALCIUM 20 MG/1
20 TABLET, FILM COATED ORAL DAILY
Qty: 90 TABLET | Refills: 2 | Status: SHIPPED | OUTPATIENT
Start: 2025-04-19

## 2025-04-21 NOTE — TELEPHONE ENCOUNTER
Outbound call to patient. Name and  verified. Advised patient that 90 day prescription with 2 refills has been sent to [facility] pharmacy. Patient verbalized understanding and was satisfied.    Carmela Jesus CMA

## 2025-05-13 NOTE — TELEPHONE ENCOUNTER
Called, spoke to pt. Two pt identifiers confirmed. Writer inform patient of Information and phone number to [facility] [place].  [phone] Pt verbalized understanding of information discussed w/ no further questions at this time. No

## 2025-07-30 ENCOUNTER — OFFICE VISIT (OUTPATIENT)
Age: 75
End: 2025-07-30
Payer: COMMERCIAL

## 2025-07-30 ENCOUNTER — RESULTS FOLLOW-UP (OUTPATIENT)
Age: 75
End: 2025-07-30

## 2025-07-30 VITALS
DIASTOLIC BLOOD PRESSURE: 60 MMHG | TEMPERATURE: 97.9 F | RESPIRATION RATE: 16 BRPM | SYSTOLIC BLOOD PRESSURE: 120 MMHG | HEIGHT: 68 IN | OXYGEN SATURATION: 97 % | HEART RATE: 79 BPM | WEIGHT: 156.4 LBS | BODY MASS INDEX: 23.7 KG/M2

## 2025-07-30 DIAGNOSIS — Z00.00 MEDICARE ANNUAL WELLNESS VISIT, SUBSEQUENT: Primary | ICD-10-CM

## 2025-07-30 DIAGNOSIS — E78.00 HYPERCHOLESTEROLEMIA: ICD-10-CM

## 2025-07-30 DIAGNOSIS — R73.03 PREDIABETES: ICD-10-CM

## 2025-07-30 LAB
EST. AVERAGE GLUCOSE BLD GHB EST-MCNC: 126 MG/DL
HBA1C MFR BLD: 6 % (ref 4–5.6)

## 2025-07-30 PROCEDURE — 1123F ACP DISCUSS/DSCN MKR DOCD: CPT | Performed by: FAMILY MEDICINE

## 2025-07-30 PROCEDURE — G0439 PPPS, SUBSEQ VISIT: HCPCS | Performed by: FAMILY MEDICINE

## 2025-07-30 PROCEDURE — 99213 OFFICE O/P EST LOW 20 MIN: CPT | Performed by: FAMILY MEDICINE

## 2025-07-30 ASSESSMENT — PATIENT HEALTH QUESTIONNAIRE - PHQ9
SUM OF ALL RESPONSES TO PHQ QUESTIONS 1-9: 0
2. FEELING DOWN, DEPRESSED OR HOPELESS: NOT AT ALL
1. LITTLE INTEREST OR PLEASURE IN DOING THINGS: NOT AT ALL
SUM OF ALL RESPONSES TO PHQ QUESTIONS 1-9: 0

## 2025-07-30 ASSESSMENT — LIFESTYLE VARIABLES
HOW MANY STANDARD DRINKS CONTAINING ALCOHOL DO YOU HAVE ON A TYPICAL DAY: 1 OR 2
HOW OFTEN DO YOU HAVE A DRINK CONTAINING ALCOHOL: 2-4 TIMES A MONTH

## 2025-07-30 NOTE — PROGRESS NOTES
Chief Complaint   Patient presents with    Medicare AWV            1. \"Have you been to the ER, urgent care clinic since your last visit?  Hospitalized since your last visit?\" No    2. \"Have you seen or consulted any other health care providers outside of the [facility] since your last visit?\" No     3. For patients aged 45-75: Has the patient had a colonoscopy / FIT/ Cologuard? Yes - Care Gap present. Most recent result on file  2020, due 2025    If the patient is female:    4. For patients aged 40-74: Has the patient had a mammogram within the past 2 years? Yes - no Care Gap present, 2025 Ruben Mcneil      5. For patients aged 21-65: Has the patient had a pap smear? NA - based on age or sex        Click Here for Release of Records Request           7/30/2025     8:52 AM   PHQ-9    Little interest or pleasure in doing things 0   Feeling down, depressed, or hopeless 0   PHQ-2 Score 0   PHQ-9 Total Score 0           Financial Resource Strain: Low Risk  (1/30/2024)    Overall Financial Resource Strain (CARDIA)     Difficulty of Paying Living Expenses: Not hard at all      Food Insecurity: No Food Insecurity (3/12/2025)    Hunger Vital Sign     Worried About Running Out of Food in the Last Year: Never true     Ran Out of Food in the Last Year: Never true          Health Maintenance Due   Topic Date Due    Breast cancer screen  04/15/2025    COVID-19 Vaccine (7 - 2024-25 season) 07/02/2025      
Overview Note:     DCIS; Onc Dr Lazaro        Arthritis of hip 2011     Overview Note:     Xrays 2005        Seborrheic keratosis 2011     Overview Note:     Dermatology, Dr Lopez        S/P colonoscopic polypectomy 2011     Overview Note:     Dr Hunter        History of iron deficiency anemia 2011    IBS (irritable bowel syndrome) 2010     Past Surgical History:   Procedure Laterality Date     SECTION  1974,     COLONOSCOPY  2015    Repeat 5 yrs/Fran Hunter    COLONOSCOPY  2020    Polyp, Repeat 5 yrs, Dr. Hunter    COLONOSCOPY W/BIOPSY SINGLE/MULTIPLE      benign polyp; f/u 5 yrs, Dr Hunter    COLONOSCOPY W/BIOPSY SINGLE/MULTIPLE  2005    benign polyp; f/u 5 yrs, Dr Hunter    HAND/FINGER SURGERY UNLISTED      dislocated thumb surgery repair    HYSTERECTOMY (CERVIX STATUS UNKNOWN)  2004    CARLA-LSO, fibroids    MASTECTOMY  2004    left breast DCIS, total mastectomy and saline implant    MASTECTOMY      left    TUBAL LIGATION      US HEAD NECK SOFT TISSUE  2007    normal     Current Outpatient Medications   Medication Sig    atorvastatin (LIPITOR) 20 MG tablet Take 1 tablet by mouth daily    QVAR REDIHALER 80 MCG/ACT AERB inhaler Inhale 2 puffs into the lungs in the morning and at bedtime (Patient taking differently: Inhale 2 puffs into the lungs in the morning and at bedtime Pt uses daily, 1 or 2 puffs.)    diclofenac (VOLTAREN) 75 MG EC tablet Take 1 tablet by mouth as needed for Pain    Multiple Vitamin (MULTI-VITAMIN DAILY) TABS Take 1 tablet by mouth daily    albuterol sulfate HFA (PROVENTIL;VENTOLIN;PROAIR) 108 (90 Base) MCG/ACT inhaler Inhale 1 puff into the lungs every 6 hours as needed    Cetirizine HCl 10 MG CAPS Take 5 mg by mouth daily (Patient taking differently: Take 5 mg by mouth daily Pt taking 10 mg during summer months.)    vitamin D 25 MCG (1000 UT) CAPS Take 2 capsules by mouth daily     No current facility-administered medications

## 2025-07-31 NOTE — TELEPHONE ENCOUNTER
Called patient. Name and  confirmed. Conveyed lab results per provider's note. Patient verbalized understanding.

## 2025-07-31 NOTE — TELEPHONE ENCOUNTER
Advise patient her HgbA1c has gone up from 5.8 4 months ago to 6.0 now. I know she was hopeful that it would go down after cutting out her sodas and reducing her sweets, but she also needs to be mindful of carbs such as bread, pasta, rice, potatoes, chips, cereal, dairy, and certain fruits. She can also reference the American Diabetes Association diet guidelines and recommendations. She can also check w/ her insurance for nutritionists/dieticians that may be covered under her plan. We can reassess this in 3/2026 with the rest of her complete fasting labs or she can follow up again in 3 months to more closely monitor her progress.